# Patient Record
Sex: MALE | Race: WHITE | NOT HISPANIC OR LATINO | Employment: FULL TIME | URBAN - METROPOLITAN AREA
[De-identification: names, ages, dates, MRNs, and addresses within clinical notes are randomized per-mention and may not be internally consistent; named-entity substitution may affect disease eponyms.]

---

## 2017-05-03 ENCOUNTER — ALLSCRIPTS OFFICE VISIT (OUTPATIENT)
Dept: OTHER | Facility: OTHER | Age: 37
End: 2017-05-03

## 2017-05-06 ENCOUNTER — HOSPITAL ENCOUNTER (EMERGENCY)
Facility: HOSPITAL | Age: 37
Discharge: HOME | End: 2017-05-06
Attending: EMERGENCY MEDICINE
Payer: COMMERCIAL

## 2017-05-06 VITALS
HEART RATE: 83 BPM | SYSTOLIC BLOOD PRESSURE: 118 MMHG | BODY MASS INDEX: 41.75 KG/M2 | WEIGHT: 315 LBS | OXYGEN SATURATION: 98 % | DIASTOLIC BLOOD PRESSURE: 56 MMHG | RESPIRATION RATE: 18 BRPM | HEIGHT: 73 IN

## 2017-05-06 DIAGNOSIS — T15.00XA CORNEAL FOREIGN BODY: Primary | ICD-10-CM

## 2017-05-06 PROCEDURE — 99283 EMERGENCY DEPT VISIT LOW MDM: CPT

## 2017-05-06 PROCEDURE — 90715 TDAP VACCINE 7 YRS/> IM: CPT | Performed by: EMERGENCY MEDICINE

## 2017-05-06 PROCEDURE — 90471 IMMUNIZATION ADMIN: CPT

## 2017-05-06 RX ORDER — PROPARACAINE HYDROCHLORIDE 5 MG/ML
2 SOLUTION/ DROPS OPHTHALMIC ONCE
Status: COMPLETED | OUTPATIENT
Start: 2017-05-06 | End: 2017-05-06

## 2017-05-06 RX ORDER — TOBRAMYCIN 3 MG/ML
1 SOLUTION/ DROPS OPHTHALMIC ONCE
Status: COMPLETED | OUTPATIENT
Start: 2017-05-06 | End: 2017-05-06

## 2017-05-06 RX ADMIN — FLUORESCEIN SODIUM 1 STRIP: 1 STRIP OPHTHALMIC at 03:07

## 2017-05-06 RX ADMIN — TETANUS TOXOID, REDUCED DIPHTHERIA TOXOID AND ACELLULAR PERTUSSIS VACCINE, ADSORBED 0.5 ML: 5; 2.5; 8; 8; 2.5 SUSPENSION INTRAMUSCULAR at 03:13

## 2017-05-06 RX ADMIN — TOBRAMYCIN 1 DROP: 3 SOLUTION OPHTHALMIC at 03:14

## 2017-05-06 RX ADMIN — PROPARACAINE HYDROCHLORIDE 2 DROP: 5 SOLUTION/ DROPS OPHTHALMIC at 03:08

## 2017-11-20 ENCOUNTER — TRANSCRIBE ORDERS (OUTPATIENT)
Dept: SLEEP CENTER | Facility: CLINIC | Age: 37
End: 2017-11-20

## 2017-11-20 ENCOUNTER — GENERIC CONVERSION - ENCOUNTER (OUTPATIENT)
Dept: OTHER | Facility: OTHER | Age: 37
End: 2017-11-20

## 2017-11-20 DIAGNOSIS — G47.33 OBSTRUCTIVE SLEEP APNEA: ICD-10-CM

## 2017-11-20 DIAGNOSIS — M25.561 PAIN IN RIGHT KNEE: ICD-10-CM

## 2017-11-21 ENCOUNTER — TRANSCRIBE ORDERS (OUTPATIENT)
Dept: SLEEP CENTER | Facility: CLINIC | Age: 37
End: 2017-11-21

## 2017-11-21 DIAGNOSIS — G47.33 OSA (OBSTRUCTIVE SLEEP APNEA): Primary | ICD-10-CM

## 2017-11-27 ENCOUNTER — HOSPITAL ENCOUNTER (OUTPATIENT)
Dept: PULMONOLOGY | Facility: HOSPITAL | Age: 37
Discharge: HOME | End: 2017-11-27
Payer: COMMERCIAL

## 2017-11-27 ENCOUNTER — DOCUMENTATION (OUTPATIENT)
Dept: RESPIRATORY THERAPY | Facility: HOSPITAL | Age: 37
End: 2017-11-27

## 2017-11-27 DIAGNOSIS — G47.33 OBSTRUCTIVE SLEEP APNEA: ICD-10-CM

## 2017-11-27 LAB
BASE EXCESS BLDA CALC-SCNC: -2.1 MMOL/L
BODY TEMPERATURE: 98.6 DEGREES FEHRENHEIT
HCO3 BLDA-SCNC: 22.7 MMOL/L (ref 22–28)
O2 CT BLDA-SCNC: 20.4 ML/DL (ref 16–23)
OXYHGB MFR BLDA: 95.1 % (ref 94–97)
PCO2 BLDA: 39.5 MM HG (ref 36–44)
PCO2 TEMP ADJ BLDA: 39.5 MM HG (ref 36–44)
PH BLD: 7.38 [PH] (ref 7.35–7.45)
PH BLDA: 7.38 [PH] (ref 7.35–7.45)
PO2 BLD: 85.9 MM HG (ref 75–129)
PO2 BLDA: 85.9 MM HG (ref 75–129)

## 2017-11-27 PROCEDURE — 82805 BLOOD GASES W/O2 SATURATION: CPT

## 2017-11-27 PROCEDURE — 36600 WITHDRAWAL OF ARTERIAL BLOOD: CPT

## 2017-12-12 ENCOUNTER — ALLSCRIPTS OFFICE VISIT (OUTPATIENT)
Dept: OTHER | Facility: OTHER | Age: 37
End: 2017-12-12

## 2017-12-12 ENCOUNTER — APPOINTMENT (OUTPATIENT)
Dept: RADIOLOGY | Facility: OTHER | Age: 37
End: 2017-12-12
Payer: COMMERCIAL

## 2017-12-12 DIAGNOSIS — M25.561 PAIN IN RIGHT KNEE: ICD-10-CM

## 2017-12-12 PROCEDURE — 73564 X-RAY EXAM KNEE 4 OR MORE: CPT

## 2017-12-12 PROCEDURE — 73562 X-RAY EXAM OF KNEE 3: CPT

## 2017-12-13 NOTE — PROGRESS NOTES
Assessment    1  History of Neuroplasty Decompression Median Nerve At Carpal Tunnel   2  Family history of malignant neoplasm (V16 9) (Z80 9) : Family History   3  Family history of arthritis (V17 7) (Z82 61) : Family History   4  Right knee pain (719 46) (M25 561)   5  Infrapatellar bursitis of right knee (726 69) (M70 51)    Plan  Infrapatellar bursitis of right knee    · Follow-up PRN Evaluation and Treatment  Follow-up  Status: Complete  Done:[id number]  Right knee pain    · * XR KNEE 3 VW LEFT NON INJURY; Status:Active - Retrospective By ProtocolAuthorization; Requested for:[id number];    · * XR KNEE 4+ VW RIGHT INJURY; Status:Active - Retrospective By ProtocolAuthorization; Requested for:[id number];     Discussion/Summary    The patient has infrapatellar bursitis which is symptomatic when he kneels on it, it is not significant enough that aspiration would be helpful and it is not indicated for surgical excision as he would have pain over the scar with kneeling, instead we talked about knee pads and knee pad modifications to help avoid direct pressure to this area, hopefully this will improve his symptoms  History of Present Illness  HPI: Racheal Cohen is a 29-year-old male presents to the office with a painful bump on his right knee  He denies any injury or trauma  HE works as a  for [employer] so he is constantly kneeling  He denies redness or drainage  He has tried using knee pads without any significant improvement  He denies pain with walking or ascending or descending stairs  Review of Systems   Constitutional: No fever or chills, feels well, no tiredness, no recent weight loss or weight gain  Cardiovascular: No complaints of chest pain, no palpitations, no leg claudication or lower extremity edema  Respiratory: No complaints of shortness of breath, no wheezing, no cough  Gastrointestinal: No complaints of abdominal pain, no constipation, no nausea or vomiting, no diarrhea or bloody stools  Musculoskeletal: as noted in HPI  Integumentary: No complaints of skin rash or lesion, no itching or dry skin, no skin wounds  ROS reviewed  Active Problems  1  ADHD (attention deficit hyperactivity disorder) (314 01) (F90 9)   2  Hypertension (401 9) (I10)   3  JAMIE (obstructive sleep apnea) (327 23) (G47 33)   4  Right knee pain (719 46) (M25 561)    Past Medical History   · History of Community acquired pneumonia (5) (J18 9)   · History of mycobacterial infection (V12 09) (Z86 19)   · History of Hypoxia (799 02) (R09 02)    The active problems and past medical history were reviewed and updated today  Surgical History   · History of Neuroplasty Decompression Median Nerve At Carpal Tunnel   · History of Tonsillectomy    The surgical history was reviewed and updated today  Family History  Mother    · Family history of diabetes mellitus (V18 0) (Z83 3)  Family History    · Family history of arthritis (V17 7) (Z82 61)   · Family history of malignant neoplasm (V16 9) (Z80 9)    The family history was reviewed and updated today  Social History     · Daily caffeine consumption, 2-3 servings a day   · Never a smoker   · No alcohol use  The social history was reviewed and updated today  Current Meds   1  Cozaar 100 MG Oral Tablet; TAKE 1 TABLET DAILY; Therapy: (Recorded:[id number]) to Recorded   2  HydroCHLOROthiazide 25 MG Oral Tablet; TAKE 1 TABLET DAILY AS DIRECTED; Therapy: (Recorded:[id number]) to Recorded   3  Lexapro 10 MG Oral Tablet; TAKE 1 TABLET DAILY; Therapy: (Recorded:[id number]) to Recorded   4  Toprol  MG Oral Tablet Extended Release 24 Hour; TAKE 1 TABLET DAILY; Therapy: (Recorded:[id number]) to Recorded   5  Vyvanse 60 MG Oral Capsule; TAKE CAPSULE; Therapy: (Recorded:[id number]) to Recorded    The medication list was reviewed and updated today  Allergies  1   No Known Drug Allergies    Vitals  Signs     Heart Rate: 85  Systolic: 746, Sitting  Diastolic: 70, Sitting  Weight: 324 lb 6 oz  BMI Calculated: 42 8  BSA Calculated: 2 64    Physical Exam    Right Knee: Appearance: swelling (infrapatellar region )  Tenderness: not over the lateral joint line,-- not over the medial joint line-- and-- no popliteal mass  Palpatory findings include no crepitus  ROM: Full  Motor: Normal  Special Test: negative patellar grind,-- negative patellofemoral apprehension test,-- negative Bounce Home test,-- negative medial Alexa test,-- negative lateral Alexa test-- and-- negative Lachman's test   Constitutional - General appearance: Normal   Musculoskeletal - Gait and station: Normal -- Muscle strength/tone: Normal -- Lower extremity compartments: Normal   Neurologic - Sensation: Normal -- Lower extremity peripheral neuro exam: Normal   Psychiatric - Orientation to person, place, and time: Normal -- Mood and affect: Normal       Results/Data  I personally reviewed the films/images/results in the office today  My interpretation follows  X-ray Review 4 views right knee: no fx or dislocation; no OAviews left knee: no fx or dislocation; no OA        Signatures   Electronically signed by : KELLEY Bingham ; Dec 12 2017  4:22PM EST                       (Author)

## 2017-12-20 ENCOUNTER — HOSPITAL ENCOUNTER (OUTPATIENT)
Dept: SLEEP CENTER | Facility: CLINIC | Age: 37
Discharge: HOME | End: 2017-12-21
Payer: COMMERCIAL

## 2017-12-20 DIAGNOSIS — G47.33 OSA (OBSTRUCTIVE SLEEP APNEA): ICD-10-CM

## 2017-12-20 PROCEDURE — 95811 POLYSOM 6/>YRS CPAP 4/> PARM: CPT

## 2017-12-21 ENCOUNTER — LAB REQUISITION (OUTPATIENT)
Dept: LAB | Facility: HOSPITAL | Age: 37
End: 2017-12-21
Payer: COMMERCIAL

## 2017-12-21 ENCOUNTER — HOSPITAL ENCOUNTER (OUTPATIENT)
Dept: SLEEP CENTER | Facility: CLINIC | Age: 37
Discharge: HOME | End: 2017-12-22
Payer: COMMERCIAL

## 2017-12-21 DIAGNOSIS — G47.419 NARCOLEPSY WITHOUT CATAPLEXY: ICD-10-CM

## 2017-12-21 DIAGNOSIS — G47.419 PRIMARY NARCOLEPSY WITHOUT CATAPLEXY: ICD-10-CM

## 2017-12-21 PROCEDURE — 80307 DRUG TEST PRSMV CHEM ANLYZR: CPT | Performed by: INTERNAL MEDICINE

## 2017-12-21 PROCEDURE — 95805 MULTIPLE SLEEP LATENCY TEST: CPT

## 2018-01-08 LAB
AMPHETAMINES UR QL SCN: POSITIVE
BARBITURATES UR QL SCN: NEGATIVE NG/ML
BENZODIAZ UR QL SCN: NEGATIVE NG/ML
BZE UR QL SCN: NEGATIVE NG/ML
CANNABINOIDS UR QL SCN: NEGATIVE NG/ML
METHADONE UR QL SCN: NEGATIVE NG/ML
OPIATES UR QL: NEGATIVE
PCP UR QL: NEGATIVE NG/ML
PROPOXYPH UR QL: NEGATIVE NG/ML

## 2018-01-10 NOTE — MISCELLANEOUS
Message  I called the patient with PSG results - JAMIE mild with AHI 8 and lowest O2 sat 83%  Will order CPAP titration study as the patient is symptomatic with daytime somnolence  Plan  JAMIE (obstructive sleep apnea)    · *Polysomnography, Sleep Study, CPAP/BiPAP titration; Status:Need Information -  Financial Authorization; Requested for:[id number];   there any other medical conditions or medications that would explain these      symptoms? : No  is the sleep disturbance affecting the patient's ability to function? : fatigue, dyspnea  History/Symptoms: : daytime somnolence  Study Only or Consult : Sleep Study Only Follow up with Referring Physician    Signatures   Electronically signed by : Rickey Kanner, DO;  Apr 11 2016  9:57AM EST                       (Author)

## 2018-01-13 VITALS
OXYGEN SATURATION: 99 % | DIASTOLIC BLOOD PRESSURE: 74 MMHG | BODY MASS INDEX: 41.75 KG/M2 | TEMPERATURE: 97.7 F | RESPIRATION RATE: 14 BRPM | HEART RATE: 70 BPM | WEIGHT: 315 LBS | HEIGHT: 73 IN | SYSTOLIC BLOOD PRESSURE: 112 MMHG

## 2018-01-18 NOTE — MISCELLANEOUS
Message  The patient produced expectorated sputum in the hospital that today came back for acid fast bacilli in broth culture  I do not believe clinically that the patient has tuberculosis  I spoke with infectious disease physician who followed him in the hospital who agrees  At this time we will wait to see what is speciated out as the patient is completely asymptomatic at this time  Instead of doing a chest x-ray in 4 weeks we will repeat a CT scan  If there is complete resolution of infiltrate we will presume that this was a contaminant as it was not a bronchoscopy specimen  If there is an infiltrate we will consider bronchoscopy at that time  Plan  Acid fast bacillus, Community acquired pneumonia    · * CT CHEST WO CONTRAST; Status:Need Information - Financial Authorization;   Requested for:01Apr2016;     Signatures   Electronically signed by : Trisha Cloud DO; Feb 25 2016  3:07PM EST                       (Author)

## 2018-01-22 VITALS
HEIGHT: 73 IN | RESPIRATION RATE: 18 BRPM | WEIGHT: 315 LBS | TEMPERATURE: 97.2 F | SYSTOLIC BLOOD PRESSURE: 138 MMHG | DIASTOLIC BLOOD PRESSURE: 60 MMHG | HEART RATE: 86 BPM | BODY MASS INDEX: 41.75 KG/M2 | OXYGEN SATURATION: 96 %

## 2018-01-22 VITALS
WEIGHT: 315 LBS | BODY MASS INDEX: 42.8 KG/M2 | DIASTOLIC BLOOD PRESSURE: 70 MMHG | SYSTOLIC BLOOD PRESSURE: 131 MMHG | HEART RATE: 85 BPM

## 2018-02-21 DIAGNOSIS — G47.10 HYPERSOMNOLENCE: Primary | ICD-10-CM

## 2018-02-21 RX ORDER — MODAFINIL 200 MG/1
TABLET ORAL
Qty: 90 TABLET | Refills: 1 | Status: SHIPPED | OUTPATIENT
Start: 2018-02-21 | End: 2018-06-11 | Stop reason: SDUPTHER

## 2018-02-21 NOTE — TELEPHONE ENCOUNTER
Can you please make sure this is right  I only put one refill cause I did not know if you wanted more then that      Thank You

## 2018-04-13 DIAGNOSIS — G47.33 OSA (OBSTRUCTIVE SLEEP APNEA): Primary | ICD-10-CM

## 2018-04-13 RX ORDER — MODAFINIL 200 MG/1
200 TABLET ORAL
Qty: 60 TABLET | Refills: 0 | Status: SHIPPED | OUTPATIENT
Start: 2018-04-13 | End: 2018-06-11 | Stop reason: SDUPTHER

## 2018-04-13 RX ORDER — MODAFINIL 200 MG/1
200 TABLET ORAL
Qty: 60 TABLET | Refills: 0 | Status: SHIPPED | OUTPATIENT
Start: 2018-04-13 | End: 2018-04-13 | Stop reason: SDUPTHER

## 2018-06-11 DIAGNOSIS — G47.10 HYPERSOMNIA: Primary | ICD-10-CM

## 2018-06-11 RX ORDER — MODAFINIL 100 MG/1
100 TABLET ORAL DAILY
Qty: 30 TABLET | Refills: 0 | Status: SHIPPED | OUTPATIENT
Start: 2018-06-11 | End: 2018-06-12

## 2018-06-11 RX ORDER — MODAFINIL 100 MG/1
100 TABLET ORAL DAILY
Qty: 30 TABLET | Refills: 0 | Status: SHIPPED | OUTPATIENT
Start: 2018-06-11 | End: 2018-06-11 | Stop reason: CLARIF

## 2018-06-12 DIAGNOSIS — G47.10 HYPERSOMNIA: Primary | ICD-10-CM

## 2018-06-12 RX ORDER — MODAFINIL 100 MG/1
100 TABLET ORAL DAILY
Qty: 30 TABLET | Refills: 0 | Status: SHIPPED | OUTPATIENT
Start: 2018-06-12 | End: 2018-07-12 | Stop reason: SDUPTHER

## 2018-07-12 DIAGNOSIS — G47.10 HYPERSOMNIA: ICD-10-CM

## 2018-07-12 RX ORDER — MODAFINIL 100 MG/1
100 TABLET ORAL DAILY
Qty: 30 TABLET | Refills: 0 | Status: SHIPPED | OUTPATIENT
Start: 2018-07-12 | End: 2018-07-25 | Stop reason: SDUPTHER

## 2018-07-18 ENCOUNTER — DOCUMENTATION (OUTPATIENT)
Dept: PULMONOLOGY | Facility: HOSPITAL | Age: 38
End: 2018-07-18

## 2018-07-25 ENCOUNTER — OFFICE VISIT (OUTPATIENT)
Dept: PULMONOLOGY | Facility: CLINIC | Age: 38
End: 2018-07-25
Payer: COMMERCIAL

## 2018-07-25 VITALS
SYSTOLIC BLOOD PRESSURE: 138 MMHG | HEART RATE: 90 BPM | HEIGHT: 73 IN | BODY MASS INDEX: 41.75 KG/M2 | RESPIRATION RATE: 18 BRPM | TEMPERATURE: 96.8 F | DIASTOLIC BLOOD PRESSURE: 74 MMHG | OXYGEN SATURATION: 97 % | WEIGHT: 315 LBS

## 2018-07-25 DIAGNOSIS — G47.10 HYPERSOMNIA: ICD-10-CM

## 2018-07-25 DIAGNOSIS — G47.33 OSA (OBSTRUCTIVE SLEEP APNEA): Primary | ICD-10-CM

## 2018-07-25 DIAGNOSIS — F90.9 ATTENTION DEFICIT HYPERACTIVITY DISORDER (ADHD), UNSPECIFIED ADHD TYPE: ICD-10-CM

## 2018-07-25 PROCEDURE — 99214 OFFICE O/P EST MOD 30 MIN: CPT | Performed by: INTERNAL MEDICINE

## 2018-07-25 RX ORDER — LISDEXAMFETAMINE DIMESYLATE 30 MG/1
CAPSULE ORAL
Refills: 0 | COMMUNITY
Start: 2018-06-07 | End: 2018-07-25 | Stop reason: SDUPTHER

## 2018-07-25 RX ORDER — LISDEXAMFETAMINE DIMESYLATE 30 MG/1
30 CAPSULE ORAL EVERY MORNING
Qty: 90 CAPSULE | Refills: 0 | Status: SHIPPED | OUTPATIENT
Start: 2018-07-25 | End: 2018-08-23 | Stop reason: SDUPTHER

## 2018-07-25 RX ORDER — MODAFINIL 100 MG/1
100 TABLET ORAL DAILY
Qty: 90 TABLET | Refills: 3 | Status: SHIPPED | OUTPATIENT
Start: 2018-07-25 | End: 2018-09-28 | Stop reason: SDUPTHER

## 2018-07-25 RX ORDER — FEXOFENADINE HCL 180 MG/1
180 TABLET ORAL AS NEEDED
COMMUNITY

## 2018-07-25 NOTE — ASSESSMENT & PLAN NOTE
1  We will refill Provigil and Vyvanse   2  Patient will try new mask [facility] Mask   3   Continue CPAP for HS

## 2018-07-25 NOTE — PATIENT INSTRUCTIONS
JAMIE (obstructive sleep apnea)  1  We will refill Provigil and Vyvanse   2  Patient will try new mask [facility] Mask   3  Continue CPAP for HS     ADHD (attention deficit hyperactivity disorder)  4  Continue Provigil and Vyvanse   Refills Provided

## 2018-08-23 DIAGNOSIS — F90.9 ATTENTION DEFICIT HYPERACTIVITY DISORDER (ADHD), UNSPECIFIED ADHD TYPE: ICD-10-CM

## 2018-08-23 DIAGNOSIS — G47.10 HYPERSOMNIA: ICD-10-CM

## 2018-08-23 DIAGNOSIS — G47.33 OSA (OBSTRUCTIVE SLEEP APNEA): ICD-10-CM

## 2018-08-23 RX ORDER — LISDEXAMFETAMINE DIMESYLATE 30 MG/1
30 CAPSULE ORAL EVERY MORNING
Qty: 90 CAPSULE | Refills: 0 | Status: SHIPPED | OUTPATIENT
Start: 2018-08-23 | End: 2018-09-20 | Stop reason: SDUPTHER

## 2018-09-20 DIAGNOSIS — F90.9 ATTENTION DEFICIT HYPERACTIVITY DISORDER (ADHD), UNSPECIFIED ADHD TYPE: ICD-10-CM

## 2018-09-20 DIAGNOSIS — G47.10 HYPERSOMNIA: ICD-10-CM

## 2018-09-20 DIAGNOSIS — G47.33 OSA (OBSTRUCTIVE SLEEP APNEA): ICD-10-CM

## 2018-09-20 RX ORDER — LISDEXAMFETAMINE DIMESYLATE 30 MG/1
30 CAPSULE ORAL EVERY MORNING
Qty: 30 CAPSULE | Refills: 0 | Status: SHIPPED | OUTPATIENT
Start: 2018-09-20 | End: 2018-10-20

## 2018-09-28 DIAGNOSIS — G47.10 HYPERSOMNIA: ICD-10-CM

## 2018-09-28 RX ORDER — MODAFINIL 100 MG/1
100 TABLET ORAL DAILY
Qty: 90 TABLET | Refills: 0 | Status: SHIPPED | OUTPATIENT
Start: 2018-09-28 | End: 2019-10-10

## 2018-10-02 ENCOUNTER — OFFICE VISIT (OUTPATIENT)
Dept: BARIATRICS | Facility: CLINIC | Age: 38
End: 2018-10-02
Payer: COMMERCIAL

## 2018-10-02 VITALS
WEIGHT: 315 LBS | RESPIRATION RATE: 16 BRPM | HEIGHT: 73 IN | TEMPERATURE: 96.9 F | HEART RATE: 71 BPM | SYSTOLIC BLOOD PRESSURE: 120 MMHG | BODY MASS INDEX: 41.75 KG/M2 | DIASTOLIC BLOOD PRESSURE: 80 MMHG

## 2018-10-02 DIAGNOSIS — I10 ESSENTIAL HYPERTENSION: Primary | ICD-10-CM

## 2018-10-02 DIAGNOSIS — R73.01 IMPAIRED FASTING GLUCOSE: ICD-10-CM

## 2018-10-02 DIAGNOSIS — E66.01 MORBID OBESITY WITH BMI OF 40.0-44.9, ADULT (HCC): ICD-10-CM

## 2018-10-02 DIAGNOSIS — F90.9 ATTENTION DEFICIT HYPERACTIVITY DISORDER (ADHD), UNSPECIFIED ADHD TYPE: ICD-10-CM

## 2018-10-02 DIAGNOSIS — G47.33 OSA (OBSTRUCTIVE SLEEP APNEA): ICD-10-CM

## 2018-10-02 DIAGNOSIS — R63.5 ABNORMAL WEIGHT GAIN: ICD-10-CM

## 2018-10-02 PROCEDURE — 99204 OFFICE O/P NEW MOD 45 MIN: CPT | Performed by: PHYSICIAN ASSISTANT

## 2018-10-02 NOTE — ASSESSMENT & PLAN NOTE
-previously followed by psychiatrist  Reports now stable and following w/ PCP  -on Vyvanse and Lexapro

## 2018-10-02 NOTE — ASSESSMENT & PLAN NOTE
-likely to improve with weight loss  -well controlled on current regimen  -Patient to stop HCTZ when starting VLCD  Also instructed to monitor BP daily   To call office of BP less than 100/60 consistently or if feeling lightheaded or dizzy

## 2018-10-02 NOTE — PROGRESS NOTES
Assessment/Plan: Morbid obesity with BMI of 40 0-44 9, adult ([facility] [address] )  -Discussed options of HealthyCORE-Intensive Lifestyle Intervention Program, Very Low Calorie Diet-VLCD, Conservative Program, Jennifer-En-Y Gastric Bypass and Vertical Sleeve Gastrectomy and the role of weight loss medications   -Initial weight loss goal of 5-10% weight loss for improved health  -Screening labs: check labs  -Patient is interested in pursuing VLCD in about 3 weeks after wife has consultation  Would then like to pursue HealthyCORE  -Briefly discussed seeing behavior health specialist as patient reports he has an addiction to food  Reports consumes sweets and carbs due to enjoyment/taste and not for other reasons  Discussed mindfulness and trying to view food as fuel/nutrition  Impaired fasting glucose  Last HgbA1c 6 0  -recommend repeat at this time  -avoid refined carbohydrates    Hypertension  -likely to improve with weight loss  -well controlled on current regimen  -Patient to stop HCTZ when starting VLCD  Also instructed to monitor BP daily  To call office of BP less than 100/60 consistently or if feeling lightheaded or dizzy      JAMIE (obstructive sleep apnea)  -on AutoPap  -followed by pulmonology  -likely to improve with weight loss    ADHD (attention deficit hyperactivity disorder)  -previously followed by psychiatrist  Reports now stable and following w/ PCP  -on Vyvanse and Lexapro    Goals:    Food log (ie ) www myfitnesspal com,sparkpeople  com,loseit com,calorieking  com,etc    No sugary beverages  At least 80oz of water daily  Follow up in approximately 3 weeks with Non-Surgical Dietician  Diagnoses and all orders for this visit:    Essential hypertension  -     Comprehensive metabolic panel; Future  -     Lipid panel; Future  -     TSH, 3rd generation with Free T4 reflex; Future  -     HEMOGLOBIN A1C W/ EAG ESTIMATION;  Future    JAMIE (obstructive sleep apnea)    Impaired fasting glucose  -     HEMOGLOBIN A1C W/ EAG ESTIMATION; Future    Morbid obesity with BMI of 40 0-44 9, adult (HCC)  -     Comprehensive metabolic panel; Future  -     Lipid panel; Future  -     TSH, 3rd generation with Free T4 reflex; Future  -     HEMOGLOBIN A1C W/ EAG ESTIMATION; Future    Abnormal weight gain  -     Comprehensive metabolic panel; Future  -     Lipid panel; Future  -     TSH, 3rd generation with Free T4 reflex; Future  -     HEMOGLOBIN A1C W/ EAG ESTIMATION; Future    Attention deficit hyperactivity disorder (ADHD), unspecified ADHD type          Subjective:   Chief Complaint   Patient presents with    Consult     Patient is here for [facility] consult  Patient ID: Shona Darnell  is a 30 y o  male with excess weight/obesity here to pursue weight managment  Past Medical History:   Diagnosis Date    ADHD (attention deficit hyperactivity disorder)     Hypersomnolence     Hypertension     Hypoxia     Infrapatellar bursitis of right knee     Mycobacterial infectious disease     JMAIE (obstructive sleep apnea)      HPI:  Obesity/Excess Weight:  Severity: Severe  Onset:  Early 20's - Reports 40-50 lb weight gain in past 4 years    Modifiers: Previous diet (unsure of name with packaged meals), Exercise - Medifast  Contributing factors: reports he has an addiction to foods/carbs, lack of exercise  Associated symptoms: comorbid conditions    Goals: 250 lbs    B: skips or cereal  S: none  L: chick filet/fries/sweet tea or fast food - usually skips   S: none  D: [employer] meals however now eating take out - cheese steaks, chicken wings    Fluid: 140-200oz of water, Occasional coke when out to eat, brewed Ice-tea unsweetened  Exercise: none    The following portions of the patient's history were reviewed and updated as appropriate: allergies, current medications, past family history, past medical history, past social history, past surgical history and problem list     Review of Systems   Constitutional: Negative for chills and fever  HENT: Negative for sore throat  Respiratory: Negative for cough and shortness of breath  Cardiovascular: Negative for chest pain and palpitations  Gastrointestinal: Negative for abdominal pain, constipation, diarrhea, nausea and vomiting  Genitourinary: Negative for dysuria  Musculoskeletal: Negative for arthralgias  Skin: Negative for rash  Neurological: Positive for headaches (occasional)  Negative for dizziness  Psychiatric/Behavioral: The patient is not nervous/anxious  Denies depression       Objective:    /80 (BP Location: Left arm, Patient Position: Sitting, Cuff Size: Large)   Pulse 71   Temp (!) 96 9 °F (36 1 °C) (Tympanic)   Resp 16   Ht 6' 0 5" (1 842 m)   Wt (!) 151 kg (332 lb)   BMI 44 41 kg/m²     Physical Exam   Nursing note and vitals reviewed  Constitutional   General appearance: Abnormal   well developed and morbidly obese  Eyes No conjunctival pallor  Ears, Nose, Mouth, and Throat Oral mucosa moist    Pulmonary   Respiratory effort: No increased work of breathing or signs of respiratory distress  Auscultation of lungs: Clear to auscultation, equal breath sounds bilaterally, no wheezes, no rales, no rhonci  Cardiovascular   Auscultation of heart: Normal rate and rhythm, normal S1 and S2, without murmurs  Examination of extremities for edema and/or varicosities: Normal   no edema  Abdomen   Abdomen: Abnormal   The abdomen was obese  Bowel sounds were normal  The abdomen was soft and nontender     Musculoskeletal   Gait and station: Normal     Psychiatric   Orientation to person, place and time: Normal     Affect: appropriate

## 2018-10-02 NOTE — ASSESSMENT & PLAN NOTE
-Discussed options of HealthyCORE-Intensive Lifestyle Intervention Program, Very Low Calorie Diet-VLCD, Conservative Program, Jennifer-En-Y Gastric Bypass and Vertical Sleeve Gastrectomy and the role of weight loss medications   -Initial weight loss goal of 5-10% weight loss for improved health  -Screening labs: check labs  -Patient is interested in pursuing VLCD in about 3 weeks after wife has consultation  Would then like to pursue HealthyCORE  -Briefly discussed seeing behavior health specialist as patient reports he has an addiction to food  Reports consumes sweets and carbs due to enjoyment/taste and not for other reasons  Discussed mindfulness and trying to view food as fuel/nutrition

## 2018-10-10 LAB — HBA1C MFR BLD HPLC: 5.7 %

## 2018-10-19 ENCOUNTER — OFFICE VISIT (OUTPATIENT)
Dept: BARIATRICS | Facility: CLINIC | Age: 38
End: 2018-10-19

## 2018-10-19 VITALS — HEIGHT: 73 IN | BODY MASS INDEX: 41.75 KG/M2 | WEIGHT: 315 LBS

## 2018-10-19 DIAGNOSIS — R63.5 ABNORMAL WEIGHT GAIN: ICD-10-CM

## 2018-10-19 PROCEDURE — 99999 PR OFFICE/OUTPT VISIT,PROCEDURE ONLY: CPT

## 2018-10-19 PROCEDURE — VLCD

## 2018-10-19 NOTE — PROGRESS NOTES
Initial RD session for VLCD completed  Components of diet discussed including ketosis, hydration, possible side effects  2 weeks of product ordered and received  Will f/u via e-mail on 10/22

## 2018-10-22 ENCOUNTER — PATIENT OUTREACH (OUTPATIENT)
Dept: BARIATRICS | Facility: CLINIC | Age: 38
End: 2018-10-22

## 2018-11-01 ENCOUNTER — OFFICE VISIT (OUTPATIENT)
Dept: BARIATRICS | Facility: CLINIC | Age: 38
End: 2018-11-01

## 2018-11-01 VITALS
DIASTOLIC BLOOD PRESSURE: 70 MMHG | BODY MASS INDEX: 41.75 KG/M2 | HEIGHT: 73 IN | SYSTOLIC BLOOD PRESSURE: 124 MMHG | WEIGHT: 315 LBS

## 2018-11-01 DIAGNOSIS — R63.5 ABNORMAL WEIGHT GAIN: Primary | ICD-10-CM

## 2018-11-01 PROCEDURE — VLCD

## 2018-11-01 PROCEDURE — 99999 PR OFFICE/OUTPT VISIT,PROCEDURE ONLY: CPT

## 2018-11-01 NOTE — PROGRESS NOTES
Weight Management Medical Nutrition Assessment  Chani Thompson is here for his 2 week VLCD follow up  Current weight: 321 4 lbs  Loss of 14 2 lbs in 2 weeks  Pt reports that starting the VLCD was hard at first due to his physical job as a   Pt states that he has since adjusted, and makes sure to have his protein bar and/or 1-2 approved snacks on hand to combat fatigue  Recommended pt utilize the approved snacks on days that are more physical  Hydration adequate  No adverse side effects noted  Appropriate blood work was ordered and blood pressure was taken  Will continue VLCD at this time  Anthropometric Measurements  Start Weight (lbs): 335 6 lbs  Current Weight (lbs): 321 4 lbs  TBW % Change from start weight: 3%  Ideal Body Weight (lbs): 178 3 lbs    Weight Loss History  Previous weight loss attempts: Self Created Diets (Portion Control, Healthy Food Choices, etc )    Food and Nutrition Related History    Meal Plan  Breakfast: shake @ 7 am   Snack: protein bar @ 11 am  Lunch: shake @ 3 pm  Snack: none  Dinner: soup @ 7 pm  Snack: none     Beverages: 80 oz water    Food restrictions: <50 g CHO  Cooking: self   Food Shopping: self    Physical Activity Intake  Activity: physical job as a   Physical limitations/barriers to exercise: no intense exercise while on the VLCD    Estimated Needs  Energy  [employer] Energy Needs: BMR : 3656   1-2# loss sedentary: 5861-0923                 Protein:  g (1 2-1 5g/kg IBW)  Fluid: 94 oz (35mL/kg IBW)    Nutrition Diagnosis  Yes;     Overweight/obesity  related to Excess energy intake as evidenced by  BMI more than normative standard for age and sex (obesity-grade III 40+)       Nutrition Intervention  Meal Plan  3 meal replacements & 1 protein bar  1-2 approved snacks on physical days  Emergency meal if needed  80+ oz water    Nutrition Education:   Calorie controlled menu  Lean protein food choices  Healthy snack options  Food journaling tips      Nutrition Counseling:  Strategies: meal planning, portion sizes, healthy snack choices, hydration, fiber intake, protein intake, exercise, food journal      Monitoring and Evaluation:  Evaluation criteria:  Energy Intake: 800 kcal  Meet protein needs  Maintain adequate hydration  Monitor weekly weight  Meal planning/preparation  Food journal   Portions at mealtimes and snacks    Barriers to learning:none  Readiness to change: Action  Comprehension: very good  Expected Compliance: very good

## 2018-11-09 ENCOUNTER — TELEPHONE (OUTPATIENT)
Dept: BARIATRICS | Facility: CLINIC | Age: 38
End: 2018-11-09

## 2018-11-09 NOTE — TELEPHONE ENCOUNTER
Called the lab; spoke to [facility]  Results from 10/10/18 are being faxed  I called the pt; ashley to return call regarding his billing question

## 2018-11-09 NOTE — TELEPHONE ENCOUNTER
I never received labs for this patient from [address]  Can you please call [facility] and tell them to fax us the labs ASAP  I'm not sure what you mean about the billing? If the patient has a billing question, please direct them  to the billing office and give them that number

## 2018-11-12 ENCOUNTER — TELEPHONE (OUTPATIENT)
Dept: BARIATRICS | Facility: CLINIC | Age: 38
End: 2018-11-12

## 2018-11-12 NOTE — TELEPHONE ENCOUNTER
----- Message from Jem Gallardo PA-C sent at 11/12/2018  7:45 AM EST -----  Staff Message: Please call patient and let him know HgbA1c, glucose elevated consistent with prediabetes  Avoiding refined carbohydrates such as white bread, pasta, rice, pretzels, sweets, sugary beverages and weight loss can help improve/resolve this  Should incorporate cardiovascular exercise once he is off of VLCD  Triglyercides also mildly elevated - same recommendations as above  HDL or good cholesterol is low - weight loss and increasing cardiovascular exercise can help

## 2018-11-14 ENCOUNTER — OFFICE VISIT (OUTPATIENT)
Dept: BARIATRICS | Facility: CLINIC | Age: 38
End: 2018-11-14

## 2018-11-14 VITALS
HEIGHT: 73 IN | BODY MASS INDEX: 41.75 KG/M2 | SYSTOLIC BLOOD PRESSURE: 126 MMHG | DIASTOLIC BLOOD PRESSURE: 66 MMHG | WEIGHT: 315 LBS

## 2018-11-14 DIAGNOSIS — R63.5 ABNORMAL WEIGHT GAIN: ICD-10-CM

## 2018-11-14 PROCEDURE — VLCD

## 2018-11-14 PROCEDURE — 99999 PR OFFICE/OUTPT VISIT,PROCEDURE ONLY: CPT

## 2018-11-14 NOTE — PROGRESS NOTES
Weight Management Medical Nutrition Assessment  Rika Johnson is here for his 4 week VLCD follow up  Current weight: 315 6 lbs  Loss of 5 8 lbs in 2 weeks, and 20 lbs overall (5% TBW)  Over the last 2 weeks, pt utilized the emergency meal 3x for birthday parties (steak & broccoli)  Hydration adequate  Pt reports some fatigue and constipation  Pt is interested in transitioning off of the VLCD due to the upcoming Thanksgiving holiday  Transition diet was reviewed in detail, and pt plans on following for 1-2 weeks  Reviewed kcal needs for a 1-2 lbs weight loss, and recommended pt increase kcal level accordingly after following the transition diet  Recommended pt track intake using MyFitnessPal  Pt will follow up with the PA in 2 weeks, and then with the Dietitian for meal planning sessions as needed  Blood pressure was taken - Pt to follow up with PCP and PA regarding HTN medications  Anthropometric Measurements  Start Weight (lbs): 335 6 lbs  Current Weight (lbs): 315 6 lbs  TBW % Change from start weight: 5%  Ideal Body Weight (lbs): 178 3 lbs     Weight Loss History  Previous weight loss attempts: Self Created Diets (Portion Control, Healthy Food Choices, etc )    Food and Nutrition Related History    Meal Plan  3 meal replacements & 1 protein bar  Approved snacks on more physical days  Emergency meal 3x (steak & broccoli)    Beverages: 100-120 oz water on most days    Food restrictions: <50 g CHO  Cooking: self   Food Shopping: self    Physical Activity Intake  Activity: physical job  Physical limitations/barriers to exercise: no intense exercise while on VLCD    Estimated Needs  Energy  [employer] Energy Needs: BMR : 8685   1-2# loss sedentary: 0468-6686             Lightly active:   2323-7222   Protein:  g (1 2-1 5g/kg IBW)  Fluid: 94 oz (35mL/kg IBW)    Nutrition Diagnosis  Yes;     Overweight/obesity  related to Excess energy intake as evidenced by  BMI more than normative standard for age and sex (obesity-grade III 40+)       Nutrition Intervention  Meal Plan  Breakfast: meal replacement  Snack: protein bar  Lunch: meal replacement  Snack: low carb snack  Dinner: 5 oz lean protein & 1-1 5 cups of non-starchy vegetables  Snack: low carb snack    Nutrition Education:   Calorie controlled menu  Lean protein food choices  Healthy snack options  Food journaling tips      Nutrition Counseling:  Strategies: meal planning, portion sizes, healthy snack choices, hydration, fiber intake, protein intake, exercise, food journal      Monitoring and Evaluation:  Evaluation criteria:  Energy Intake: 5783-3790 kcal while on VLCD transition diet  Meet protein needs  Maintain adequate hydration  Monitor weekly weight  Meal planning/preparation  Food journal   Portions at mealtimes and snacks  Physical activity     Barriers to learning:none  Readiness to change: Action  Comprehension: very good  Expected Compliance: very good

## 2018-11-30 ENCOUNTER — OFFICE VISIT (OUTPATIENT)
Dept: BARIATRICS | Facility: CLINIC | Age: 38
End: 2018-11-30
Payer: COMMERCIAL

## 2018-11-30 VITALS
HEIGHT: 73 IN | BODY MASS INDEX: 41 KG/M2 | DIASTOLIC BLOOD PRESSURE: 60 MMHG | HEART RATE: 80 BPM | WEIGHT: 309.38 LBS | RESPIRATION RATE: 16 BRPM | SYSTOLIC BLOOD PRESSURE: 120 MMHG | TEMPERATURE: 97.1 F

## 2018-11-30 DIAGNOSIS — R73.01 IMPAIRED FASTING GLUCOSE: ICD-10-CM

## 2018-11-30 DIAGNOSIS — E66.01 MORBID OBESITY WITH BMI OF 40.0-44.9, ADULT (HCC): Primary | ICD-10-CM

## 2018-11-30 DIAGNOSIS — I10 ESSENTIAL HYPERTENSION: ICD-10-CM

## 2018-11-30 PROCEDURE — 99214 OFFICE O/P EST MOD 30 MIN: CPT | Performed by: PHYSICIAN ASSISTANT

## 2018-11-30 NOTE — ASSESSMENT & PLAN NOTE
-likely to improve with weight loss  -well controlled on current regimen  -BP well controlled off HCTZ

## 2018-11-30 NOTE — ASSESSMENT & PLAN NOTE
-Patient is pursuing conservative program after 4 weeks of VLCD  -Initial weight loss goal of 5-10% weight loss for improved health (met)    Initial: 332lbs  Current: 309  6   Change: -22 4 lbs ( 7% TBW)  Goal: 250 lbs

## 2018-11-30 NOTE — PATIENT INSTRUCTIONS
Goals: Food log (ie ) www myfitnesspal com,sparkpeople  com,loseit com,calorieking  com,etc    No sugary beverages  At least 64oz of water daily  Increase physical activity by 10 minutes daily  Gradually increase physical activity to a goal of 5 days per week for 30 minutes of MODERATE intensity PLUS 2 days per week of FULL BODY resistance training  1900 calories per day, increase 2100 if doing 30 min of formal exercise  5-10 servings of fruits and vegetables per day    Meal suggestion examples: Serving of oatmeal with breakfast shake   For Lunch Add 5oz greek yogurt + 1/2 cup berries w/ lunch with shake  Snacks: 2-3 per day (150-200 calories) Apple or banana + 1 TBSP PB, 1 cheese stick + orange or lovely, 1/4 cup nuts

## 2018-11-30 NOTE — PROGRESS NOTES
Assessment/Plan: Morbid obesity with BMI of 40 0-44 9, adult [facility])  -Patient is pursuing conservative program after 4 weeks of VLCD  -Initial weight loss goal of 5-10% weight loss for improved health (met)    Initial: 332lbs  Current: 309  6   Change: -22 4 lbs ( 7% TBW)  Goal: 250 lbs    Hypertension  -likely to improve with weight loss  -well controlled on current regimen  -BP well controlled off HCTZ      Impaired fasting glucose  -HgbA1c 5 7  -avoid refined carbohydrates  -likely to improve with weight loss    Goals:    Food log (ie ) www myfitnesspal com,sparkpeople  com,loseit com,calorieking  com,etc    No sugary beverages  At least 64oz of water daily  Increase physical activity by 10 minutes daily  Gradually increase physical activity to a goal of 5 days per week for 30 minutes of MODERATE intensity PLUS 2 days per week of FULL BODY resistance training  1900 calories per day, increase to 2100 if doing 30 min of formal exercise  5-10 servings of fruits and vegetables per day    Meal suggestion examples: Serving of oatmeal with breakfast  Lunch Add 5oz greek yogurt + 1/2 cup berries w/ shake  Snacks: 2-3 per day (150-200 calories) Apple or banana + 1 TBSP PB, 1 cheese stick + orange or lovely, 1/4 cup nuts    Follow up in approximately 6 weeks with Non-Surgical Physician/Advanced Practitioner  25 minute visit, >50% face-to-face time spent counseling patient on diet behavior and exercise modification for weight loss  Diagnoses and all orders for this visit:    Morbid obesity with BMI of 40 0-44 9, adult ([address] )    Essential hypertension    Impaired fasting glucose          Subjective:   Chief Complaint   Patient presents with    Follow-up     Pt is here for MWM follow up  Patient ID: Carlota Sheriff  is a 41 y o  male with excess weight/obesity here to pursue weight managment  Patient is pursuing Conservative Program after 4 weeks of VLCD    HPI Patient presents for MWM follow up   Doing well with hydration  oz of water per day  Logging inconsistently on DTTPal  Did not bring log with him today  Reports logging 3-4 days per week 900-1000 calories per day  Still using meal replacements - would like to continue using them at this time  He has interest in healthyCORE and will discuss with his wife  Discussed increasing calories to preserve lean muscle and have weight loss from fat mass  See goals  Food recall  B: shake 200  S: none  L: shake 200  S: snack bar 160  D: lean protein 8oz, veggies 2 cups, no starch   S: none    The following portions of the patient's history were reviewed and updated as appropriate: allergies, current medications, past family history, past medical history, past social history, past surgical history and problem list     Review of Systems   Respiratory: Negative for cough and shortness of breath  Cardiovascular: Negative for chest pain and palpitations  Gastrointestinal: Negative for abdominal pain, nausea and vomiting  Psychiatric/Behavioral:        Reports mood stable       Objective:    /60 (BP Location: Left arm, Patient Position: Sitting, Cuff Size: Large)   Pulse 80   Temp (!) 97 1 °F (36 2 °C) (Tympanic)   Resp 16   Ht 6' 0 5" (1 842 m)   Wt (!) 140 kg (309 lb 6 oz)   BMI 41 38 kg/m²      Physical Exam   Constitutional: He is oriented to person, place, and time  He appears well-developed  HENT:   Head: Normocephalic  Pulmonary/Chest: Effort normal    Neurological: He is alert and oriented to person, place, and time  Psychiatric: He has a normal mood and affect  His behavior is normal  Thought content normal    Nursing note and vitals reviewed

## 2018-12-10 DIAGNOSIS — G47.33 OSA (OBSTRUCTIVE SLEEP APNEA): Primary | ICD-10-CM

## 2018-12-10 NOTE — PROGRESS NOTES
I was called by Luis Davis who explained the machine is making a lot of noise  He discussed with christophe and its seems as if the machine is broken beyond repair  I have ordered a new machine with auto 5-10

## 2019-01-08 ENCOUNTER — DOCUMENTATION (OUTPATIENT)
Dept: PULMONOLOGY | Facility: CLINIC | Age: 39
End: 2019-01-08

## 2019-01-08 NOTE — PROGRESS NOTES
A script for a new CPAP machine has been faxed to [place] [facility] [place] along with patient's new insurance cards

## 2019-02-05 ENCOUNTER — OFFICE VISIT (OUTPATIENT)
Dept: BARIATRICS | Facility: CLINIC | Age: 39
End: 2019-02-05
Payer: COMMERCIAL

## 2019-02-05 VITALS
BODY MASS INDEX: 41.75 KG/M2 | TEMPERATURE: 96.7 F | RESPIRATION RATE: 16 BRPM | HEIGHT: 73 IN | SYSTOLIC BLOOD PRESSURE: 124 MMHG | DIASTOLIC BLOOD PRESSURE: 72 MMHG | WEIGHT: 315 LBS | HEART RATE: 71 BPM

## 2019-02-05 DIAGNOSIS — R73.01 IMPAIRED FASTING GLUCOSE: ICD-10-CM

## 2019-02-05 DIAGNOSIS — I10 ESSENTIAL HYPERTENSION: ICD-10-CM

## 2019-02-05 DIAGNOSIS — E66.01 MORBID OBESITY WITH BMI OF 40.0-44.9, ADULT (HCC): Primary | ICD-10-CM

## 2019-02-05 PROCEDURE — 99214 OFFICE O/P EST MOD 30 MIN: CPT | Performed by: PHYSICIAN ASSISTANT

## 2019-02-05 RX ORDER — METOPROLOL SUCCINATE 50 MG/1
50 TABLET, EXTENDED RELEASE ORAL DAILY
Refills: 1 | COMMUNITY
Start: 2019-01-12 | End: 2020-02-28 | Stop reason: SDUPTHER

## 2019-02-05 NOTE — PROGRESS NOTES
Assessment/Plan: Morbid obesity with BMI of 40 0-44 9, adult ([address] )  -Patient is pursuing conservative program after 4 weeks of VLCD  -Initial weight loss goal of 5-10% weight loss for improved health   -reports has not been consistently with dietary/lifestyle changes because he is lazy  -went on a 10 day cruise and reports gained 10 lbs   -initially had done very well and now finding it difficult to get back on track  Reports his wife is supportive of his weight loss efforts  -would like to continue with partial meal replacement  Set small goals for next visit    Initial: 332lbs  Current: 325 6 lbs (+16 lbs since 11/30)  Change: -6 4 lbs ( 2% TBW)  Goal: 250 lbs    Hypertension  -likely to improve with weight loss  -well controlled on current regimen  -BP well controlled off HCTZ      Impaired fasting glucose  -HgbA1c 5 7  -avoid refined carbohydrates  -likely to improve with weight loss  -consider repeat after HgbA1c after next follow up  Consider Metformin if it is still elevated  Goals:    Food log (ie ) www myfitnesspal com,sparkpeople  com,loseit com,calorieking  com,etc    No sugary beverages  At least 64oz of water daily  Increase physical activity by 10 minutes daily  Gradually increase physical activity to a goal of 5 days per week for 30 minutes of MODERATE intensity PLUS 2 days per week of FULL BODY resistance training  1900 calories per day, 2100 calories on exercise days  5-10 servings of fruits and vegetables per day  400 calorie breakfast  Add serving of fruit or vegetable to meal replacement at lunch  400-600 calorie dinner  Please don't skip breakfast    Follow up in approximately 1 month with Non-Surgical Physician/Advanced Practitioner  25 minute visit, >50% face-to-face time spent counseling patient on diet behavior and exercise modification for weight loss       Diagnoses and all orders for this visit:    Morbid obesity with BMI of 40 0-44 9, adult [facility])    Essential hypertension    Impaired fasting glucose    Other orders  -     metoprolol succinate (TOPROL-XL) 50 mg 24 hr tablet; Take 50 mg by mouth daily  -     lisdexamfetamine (VYVANSE) 30 MG capsule; Take 30 mg by mouth every morning          Subjective:   Chief Complaint   Patient presents with    Follow-up     pt is here for MWM follow up  Patient ID: Momo Perez  is a 41 y o  male with excess weight/obesity here to pursue weight managment  Patient is pursuing conservative program    HPI Patient presents for overdue  MWM follow up  Reports has been inconsistent with dietary/lifestyle changes  Went on a 10 day cruise in the beginning of January  Reports he started eating only one meal a day again  Has been skipping breakfast  Notes water intake is around 60-80 oz, denies intake of sugary, sweetened beverages  Has not been doing any formal exercise but reports he is in the process of setting up a home gym  Fruit/vegetable serving 1-3 per day  Has not been tracking calories and states he is not willing to do this  Has been out of meal replacements but would like start using a shake for lunch and a snack bar between lunch and dinner  The following portions of the patient's history were reviewed and updated as appropriate: allergies, current medications, past family history, past medical history, past social history, past surgical history and problem list     Review of Systems   Respiratory: Negative for cough and shortness of breath  Cardiovascular: Negative for chest pain and palpitations  Gastrointestinal: Negative for abdominal pain, nausea and vomiting  Psychiatric/Behavioral:        Reports mood stable       Objective:    /72 (BP Location: Left arm, Patient Position: Sitting, Cuff Size: Large)   Pulse 71   Temp (!) 96 7 °F (35 9 °C) (Tympanic)   Resp 16   Ht 6' 0 5" (1 842 m)   Wt (!) 148 kg (325 lb 9 6 oz)   BMI 43 55 kg/m²      Physical Exam   Constitutional: He is oriented to person, place, and time   He appears well-developed  HENT:   Head: Normocephalic  Pulmonary/Chest: Effort normal    Musculoskeletal: Normal range of motion  Neurological: He is alert and oriented to person, place, and time  Psychiatric: He has a normal mood and affect  His behavior is normal    Nursing note and vitals reviewed

## 2019-02-05 NOTE — ASSESSMENT & PLAN NOTE
-HgbA1c 5 7  -avoid refined carbohydrates  -likely to improve with weight loss  -consider repeat after HgbA1c after next follow up   Consider Metformin if it is still elevated

## 2019-02-05 NOTE — ASSESSMENT & PLAN NOTE
-Patient is pursuing conservative program after 4 weeks of VLCD  -Initial weight loss goal of 5-10% weight loss for improved health   -reports has not been consistently with dietary/lifestyle changes because he is lazy  -went on a 10 day cruise and reports gained 10 lbs   -initially had done very well and now finding it difficult to get back on track  Reports his wife is supportive of his weight loss efforts  -would like to continue with partial meal replacement   Set small goals for next visit    Initial: 332lbs  Current: 325 6 lbs (+16 lbs since 11/30)  Change: -6 4 lbs ( 2% TBW)  Goal: 250 lbs

## 2019-02-05 NOTE — PATIENT INSTRUCTIONS
Goals: Food log (ie ) www myfitnesspal com,sparkpeople  com,loseit com,calorieking  com,etc    No sugary beverages  At least 64oz of water daily  Increase physical activity by 10 minutes daily   Gradually increase physical activity to a goal of 5 days per week for 30 minutes of MODERATE intensity PLUS 2 days per week of FULL BODY resistance training  1900 calories per day, 2100 calories on exercise days  5-10 servings of fruits and vegetables per day - goal of 3-4 per day for next visit  400 calorie breakfast  Add serving of fruit or vegetable to meal replacement at lunch  400-600 calorie dinner  Please don't skip breakfast

## 2019-04-09 ENCOUNTER — OFFICE VISIT (OUTPATIENT)
Dept: BARIATRICS | Facility: CLINIC | Age: 39
End: 2019-04-09
Payer: COMMERCIAL

## 2019-04-09 VITALS
BODY MASS INDEX: 41.75 KG/M2 | HEIGHT: 73 IN | SYSTOLIC BLOOD PRESSURE: 120 MMHG | WEIGHT: 315 LBS | DIASTOLIC BLOOD PRESSURE: 78 MMHG | HEART RATE: 71 BPM | RESPIRATION RATE: 16 BRPM | TEMPERATURE: 97.5 F

## 2019-04-09 DIAGNOSIS — R73.01 IMPAIRED FASTING GLUCOSE: ICD-10-CM

## 2019-04-09 DIAGNOSIS — E66.01 MORBID OBESITY WITH BMI OF 40.0-44.9, ADULT (HCC): Primary | ICD-10-CM

## 2019-04-09 PROCEDURE — 99214 OFFICE O/P EST MOD 30 MIN: CPT | Performed by: PHYSICIAN ASSISTANT

## 2019-06-06 ENCOUNTER — OFFICE VISIT (OUTPATIENT)
Dept: BARIATRICS | Facility: CLINIC | Age: 39
End: 2019-06-06
Payer: COMMERCIAL

## 2019-06-06 VITALS
TEMPERATURE: 97.1 F | WEIGHT: 315 LBS | RESPIRATION RATE: 16 BRPM | HEART RATE: 72 BPM | BODY MASS INDEX: 41.75 KG/M2 | DIASTOLIC BLOOD PRESSURE: 74 MMHG | SYSTOLIC BLOOD PRESSURE: 118 MMHG | HEIGHT: 73 IN

## 2019-06-06 DIAGNOSIS — R73.01 IMPAIRED FASTING GLUCOSE: ICD-10-CM

## 2019-06-06 DIAGNOSIS — E66.01 MORBID OBESITY WITH BMI OF 40.0-44.9, ADULT (HCC): Primary | ICD-10-CM

## 2019-06-06 PROCEDURE — 99214 OFFICE O/P EST MOD 30 MIN: CPT | Performed by: PHYSICIAN ASSISTANT

## 2019-07-25 ENCOUNTER — TELEPHONE (OUTPATIENT)
Dept: BARIATRICS | Facility: CLINIC | Age: 39
End: 2019-07-25

## 2019-07-25 ENCOUNTER — OFFICE VISIT (OUTPATIENT)
Dept: BARIATRICS | Facility: CLINIC | Age: 39
End: 2019-07-25
Payer: COMMERCIAL

## 2019-07-25 VITALS
WEIGHT: 315 LBS | HEIGHT: 73 IN | HEART RATE: 76 BPM | SYSTOLIC BLOOD PRESSURE: 120 MMHG | BODY MASS INDEX: 41.75 KG/M2 | RESPIRATION RATE: 16 BRPM | DIASTOLIC BLOOD PRESSURE: 78 MMHG | TEMPERATURE: 97.3 F

## 2019-07-25 DIAGNOSIS — I10 ESSENTIAL HYPERTENSION: Primary | ICD-10-CM

## 2019-07-25 DIAGNOSIS — I10 ESSENTIAL HYPERTENSION: ICD-10-CM

## 2019-07-25 DIAGNOSIS — E66.01 MORBID OBESITY WITH BMI OF 40.0-44.9, ADULT (HCC): Primary | ICD-10-CM

## 2019-07-25 DIAGNOSIS — R73.01 IMPAIRED FASTING GLUCOSE: ICD-10-CM

## 2019-07-25 PROCEDURE — 3074F SYST BP LT 130 MM HG: CPT | Performed by: PHYSICIAN ASSISTANT

## 2019-07-25 PROCEDURE — 99214 OFFICE O/P EST MOD 30 MIN: CPT | Performed by: PHYSICIAN ASSISTANT

## 2019-07-25 NOTE — PROGRESS NOTES
Assessment/Plan: Morbid obesity with BMI of 40 0-44 9, adult [facility])  -Patient is pursuing conservative program after 4 weeks of VLCD  -Initial weight loss goal of 5-10% weight loss for improved health   -reports he was doing well with structure until wife went on a week vacation  Reports he was consuming lots of unhealthy foods and eating schedule was very sporadic  Skipping meals again  He is interested in seeing [address] now to help him work on his behavior and habits  - Patients wife is very supportive of his weight loss goals and has been helping out overall with meal prep and adding structure   -Discussed use of medications and also bariatric surgery  Patient is interested in continuing in conservative program  Not interested in medications  -See goals      Initial: 332lbs  Current: 326 8 lbs  Change: -5 2 lbs  Goal: 250 lbs    Hypertension  -likely to improve with weight loss  -well controlled on current regimen  -BP well controlled off HCTZ      Impaired fasting glucose  -HgbA1c 5 7  -avoid refined carbohydrates  -likely to improve with weight loss  -will repeat HgbA1c  Consider Metformin if it is still elevated    Goals:    Food log (ie ) www myfitnesspal com,sparkpeople  com,loseit com,calorieking  com,etc    No sugary beverages  At least 64oz of water daily  Increase physical activity by 10 minutes daily  Gradually increase physical activity to a goal of 5 days per week for 30 minutes of MODERATE intensity PLUS 2 days per week of FULL BODY resistance training  1900 calories per day  5-10 servings of fruits and vegetables per day  Trial making a schedule for the week - planning meals and snacks ahead of time  Keep healthy snacks in a designated area of the kitchen to avoid visual triggers  Check HgbA1c  Set a goal to not skip lunch, always bring a meal for lunch   Set a timer and try to not work through lunch period  [facility])    Follow up in approximately 2 months with Non-Surgical Physician/Advanced Practitioner  25 minute visit, >50% face-to-face time spent counseling patient on diet behavior and exercise modification for weight loss  Diagnoses and all orders for this visit:    Morbid obesity with BMI of 40 0-44 9, adult ([address] )  -     Hemoglobin A1C; Future    Essential hypertension    Impaired fasting glucose  -     Hemoglobin A1C; Future          Subjective:   Chief Complaint   Patient presents with    Follow-up     Pt is here for 6 wk follow up  Patient ID: Jose Morales  is a 41 y o  male with excess weight/obesity here to pursue weight managment  Patient is pursuing Conservative Program      HPI Patient presents for MWM follow up  Reports he derailed over past week when wife went on vacation  He struggles with structure and interval eating  Grabs convenient foods and often dines late in the evening  Tips given to help add more structure  He would like to pursue [facility]     The following portions of the patient's history were reviewed and updated as appropriate: allergies, current medications, past family history, past medical history, past social history, past surgical history and problem list     Review of Systems   Psychiatric/Behavioral:        Reports mood stable       Objective:    /78 (BP Location: Right arm, Patient Position: Sitting, Cuff Size: Large)   Pulse 76   Temp (!) 97 3 °F (36 3 °C) (Tympanic)   Resp 16   Ht 6' 0 5" (1 842 m)   Wt (!) 148 kg (326 lb 12 8 oz)   BMI 43 71 kg/m²      Physical Exam   Constitutional: He is oriented to person, place, and time  He appears well-developed  HENT:   Head: Normocephalic  Pulmonary/Chest: Effort normal    Neurological: He is alert and oriented to person, place, and time  Psychiatric: He has a normal mood and affect  His behavior is normal  Thought content normal    Nursing note and vitals reviewed

## 2019-07-25 NOTE — PATIENT INSTRUCTIONS
Goals: Food log (ie ) www myfitnesspal com,sparkpeople  com,loseit com,[employer]  com,etc    No sugary beverages  At least 64oz of water daily  Increase physical activity by 10 minutes daily  Gradually increase physical activity to a goal of 5 days per week for 30 minutes of MODERATE intensity PLUS 2 days per week of FULL BODY resistance training  1900 calories per day  5-10 servings of fruits and vegetables per day  Trial making a schedule for the week - planning meals and snacks ahead of time  Keep healthy snacks in a designated area of the kitchen to avoid visual triggers  Check HgbA1c  Set a goal to not skip lunch, always bring a meal for lunch   Set a timer and try to work through lunch period  Smart made meals [facility])

## 2019-07-25 NOTE — ASSESSMENT & PLAN NOTE
-Patient is pursuing conservative program after 4 weeks of VLCD  -Initial weight loss goal of 5-10% weight loss for improved health   -reports he was doing well with structure until wife went on a week vacation  Reports he was consuming lots of unhealthy foods and eating schedule was very sporadic  Skipping meals again  He is interested in seeing [facility] now to help him work on his behavior and habits  - Patients wife is very supportive of his weight loss goals and has been helping out overall with meal prep and adding structure   -Discussed use of medications and also bariatric surgery   Patient is interested in continuing in conservative program  Not interested in medications  -See goals      Initial: 332lbs  Current: 326 8 lbs  Change: -5 2 lbs  Goal: 250 lbs

## 2019-07-25 NOTE — ASSESSMENT & PLAN NOTE
-HgbA1c 5 7  -avoid refined carbohydrates  -likely to improve with weight loss  -will repeat HgbA1c   Consider Metformin if it is still elevated

## 2019-07-29 ENCOUNTER — PATIENT OUTREACH (OUTPATIENT)
Dept: BARIATRICS | Facility: CLINIC | Age: 39
End: 2019-07-29

## 2019-07-29 DIAGNOSIS — G47.33 OSA (OBSTRUCTIVE SLEEP APNEA): Primary | ICD-10-CM

## 2019-08-09 LAB
BUN SERPL-MCNC: 15 MG/DL (ref 6–20)
BUN/CREAT SERPL: 19 (ref 9–20)
CALCIUM SERPL-MCNC: 9.1 MG/DL (ref 8.7–10.2)
CHLORIDE SERPL-SCNC: 100 MMOL/L (ref 96–106)
CO2 SERPL-SCNC: 25 MMOL/L (ref 20–29)
CREAT SERPL-MCNC: 0.79 MG/DL (ref 0.76–1.27)
GLUCOSE SERPL-MCNC: 111 MG/DL (ref 65–99)
POTASSIUM SERPL-SCNC: 4.5 MMOL/L (ref 3.5–5.2)
SL AMB EGFR AFRICAN AMERICAN: 134 ML/MIN/1.73
SL AMB EGFR NON AFRICAN AMERICAN: 116 ML/MIN/1.73
SODIUM SERPL-SCNC: 139 MMOL/L (ref 134–144)

## 2019-08-12 ENCOUNTER — TELEPHONE (OUTPATIENT)
Dept: BARIATRICS | Facility: CLINIC | Age: 39
End: 2019-08-12

## 2019-08-12 NOTE — TELEPHONE ENCOUNTER
----- Message from Frederick Monterroso PA-C sent at 8/12/2019 10:15 AM EDT -----  Staff message: please call patient and let him know BMP is acceptable to start VLCD - will need an appt with Isaacjose Sutton  To start VLCD and have f/u every 2 weeks if he is interested in restarting this  I do not see that the HgbA1c was  Completed to check on his prediabetes? Did he have this done? His fasting blood sugar is still elevated on his BMP - would be good to have the HgbA1c if was not done   Thank you

## 2019-08-12 NOTE — TELEPHONE ENCOUNTER
I called and spoke to patient advised of labs  Pt stated he did not fast for the BMP he stated he was not aware of this  Pt requested script for HA1C to be faxed to [address] [place] [address] [facility]  Fax: [phone]  Lab printed and faxed, pt stated he will have test done on the day he comes to see [facility]

## 2019-08-15 ENCOUNTER — OFFICE VISIT (OUTPATIENT)
Dept: BARIATRICS | Facility: CLINIC | Age: 39
End: 2019-08-15

## 2019-08-15 VITALS — BODY MASS INDEX: 41.75 KG/M2 | HEIGHT: 73 IN | WEIGHT: 315 LBS

## 2019-08-15 DIAGNOSIS — R63.5 ABNORMAL WEIGHT GAIN: ICD-10-CM

## 2019-08-15 PROCEDURE — RECHECK

## 2019-08-15 PROCEDURE — VLCD

## 2019-08-15 NOTE — PROGRESS NOTES
Initial RD session for VLCD completed  Components of diet discussed including ketosis, hydration, possible side effects  2 weeks of product ordered and received  Will f/u via e-mail on 8/20

## 2019-08-16 LAB — HBA1C MFR BLD: 5.7 % (ref 4.8–5.6)

## 2019-08-20 ENCOUNTER — PATIENT OUTREACH (OUTPATIENT)
Dept: BARIATRICS | Facility: CLINIC | Age: 39
End: 2019-08-20

## 2019-08-28 NOTE — PROGRESS NOTES
Weight Management Medical Nutrition Assessment  Rebecca Mcclure is here for his 2 week VLCD follow up  Current weight: 320 1 lbs  Loss of 9 3 lbs since last visit  No adverse side effects noted and hydration is adequate  Pt has been utilizing 1-2 low-carb snacks to manage hunger and support physical job (works as a )  Pt states that he has been experiencing 1-2 episodes of diarrhea per day  Encouraged pt to continue with adequate hydration and to notify the office if his diarrhea worsens  Blood pressure was taken, appropriate labs were ordered, and body composition test was completed  Will continue with VLCD at this time  Anthropometric Measurements  Start Weight (lbs): 335 6 lbs at VLCD start in Fall 2018  Current Weight (lbs): 320 1 lbs  TBW % Change from start weight: 5%  Ideal Body Weight (lbs): 178 3 lbs     Weight Loss History  Previous weight loss attempts: Self Created Diets (Portion Control, Healthy Food Choices, etc ), [facility] in Fall 2018    Food and Nutrition Related History    Meal Plan  3 meal replacements & 1 protein bar   Cheese stick or hard boiled egg as a snack  1 emergency meal over the last 2 weeks  Beverages: 80+ oz water, diet coke some days    Food restrictions: <50 g CHO  Cooking: self   Food Shopping: self    Physical Activity Intake  Activity: works as a    Physical limitations/barriers to exercise: no intense exercise while on VLCD    Estimated Needs  Energy  [place]: BMR: 2561 X 1 3 -1000 = [address] Energy Needs: BMR : 1512   1-2# loss sedentary:  9352-7555         Protein:  g (1 2-1 5g/kg IBW)  Fluid: 94 oz (35mL/kg IBW)    Nutrition Diagnosis  Yes;     Overweight/obesity  related to Excess energy intake as evidenced by  BMI more than normative standard for age and sex (obesity-grade III 36+)       Nutrition Intervention  Meal Plan  3 meal replacements & 1 protein bar  Adequate hydration - 80+ oz water  1-2 approved snacks on light-moderate exercise days  Emergency meal when appropriate     Nutrition Education:   Calorie controlled menu  Lean protein food choices  Healthy snack options  Food journaling tips      Nutrition Counseling:  Strategies: meal planning, portion sizes, healthy snack choices, hydration, fiber intake, protein intake, exercise, food journal      Monitoring and Evaluation:  Evaluation criteria:  Energy Intake: ~800 kcal  Meet protein needs  Maintain adequate hydration  Monitor weekly weight  Meal planning/preparation  Food journal   Portions at mealtimes and snacks    Barriers to learning:none  Readiness to change: Action  Comprehension: excellent  Expected Compliance: excellent

## 2019-08-29 ENCOUNTER — OFFICE VISIT (OUTPATIENT)
Dept: BARIATRICS | Facility: CLINIC | Age: 39
End: 2019-08-29

## 2019-08-29 VITALS
SYSTOLIC BLOOD PRESSURE: 131 MMHG | DIASTOLIC BLOOD PRESSURE: 75 MMHG | HEIGHT: 73 IN | BODY MASS INDEX: 41.75 KG/M2 | WEIGHT: 315 LBS

## 2019-08-29 DIAGNOSIS — R63.5 ABNORMAL WEIGHT GAIN: Primary | ICD-10-CM

## 2019-08-29 PROCEDURE — RECHECK

## 2019-08-29 PROCEDURE — VLCD

## 2019-09-12 ENCOUNTER — OFFICE VISIT (OUTPATIENT)
Dept: BARIATRICS | Facility: CLINIC | Age: 39
End: 2019-09-12

## 2019-09-12 VITALS
WEIGHT: 311.2 LBS | BODY MASS INDEX: 41.24 KG/M2 | DIASTOLIC BLOOD PRESSURE: 67 MMHG | SYSTOLIC BLOOD PRESSURE: 118 MMHG | HEIGHT: 73 IN

## 2019-09-12 DIAGNOSIS — R63.5 ABNORMAL WEIGHT GAIN: ICD-10-CM

## 2019-09-12 PROCEDURE — VLCD

## 2019-09-12 PROCEDURE — RECHECK

## 2019-09-12 NOTE — PROGRESS NOTES
Weight Management Medical Nutrition Assessment  Tracie Sheth is here for his 4 week VLCD follow up  Current weight: 311 2 lbs  Loss of 8 9 lbs since last visit, and 18 2 lbs since starting VLCD 4 weeks ago (6% TBW)  No adverse side effects noted and hydration is adequate  Pt reports that his diarrhea has resolved since last visit  Pt utilized an emergency meal of steamed shrimp, crab, and brussel sprouts on one occasion  Pt completed blood work this morning - will review results as soon as available  Body composition test results reviewed and blood pressure was taken  Will continue VLCD at this time  Anthropometric Measurements  Start Weight (lbs): 329 4 lbs at VLCD restart, 335 6 lbs at VLCD start in Fall 2018  Current Weight (lbs): 311 2 lbs  TBW % Change from start weight: 6%  Ideal Body Weight (lbs): 178 3 lbs  Goal Weight (lbs): 230-250 lbs    Weight Loss History  Previous weight loss attempts: Self Created Diets (Portion Control, Healthy Food Choices, etc ), VLCD in Fall 2018    Food and Nutrition Related History    Meal Plan  3 meal replacements & 1 protein bar   Cheese stick or hard boiled egg as a snack on more physical days  1 emergency meal over the last 2 weeks  Beverages: 80+ oz water, 16 oz diet coke some days    Food restrictions: <50 g CHO  Cooking: self   Food Shopping: self    Physical Activity Intake  Activity: works as a    Physical limitations/barriers to exercise: no intense exercise while on VLCD    Estimated Needs  Energy  B[employer] [place] Energy Needs: BMR : 2388  1-2# loss sedentary:  0857-2246         Protein:  g (1 2-1 5g/kg IBW)  Fluid: 94 oz (35mL/kg IBW)    Nutrition Diagnosis  Yes;     Overweight/obesity  related to Excess energy intake as evidenced by  BMI more than normative standard for age and sex (obesity-grade III 36+)       Nutrition Intervention  Meal Plan  3 meal replacements & 1 protein bar  Adequate hydration - 80+ oz water  1-2 approved snacks on light-moderate exercise days  Emergency meal when appropriate     Nutrition Education:   Calorie controlled menu  Lean protein food choices  Healthy snack options  Food journaling tips      Nutrition Counseling:  Strategies: meal planning, portion sizes, healthy snack choices, hydration, fiber intake, protein intake, exercise, food journal      Monitoring and Evaluation:  Evaluation criteria:  Energy Intake: ~800 kcal  Meet protein needs  Maintain adequate hydration  Monitor weekly weight  Meal planning/preparation  Food journal   Portions at mealtimes and snacks    Barriers to learning:none  Readiness to change: Action  Comprehension: excellent  Expected Compliance: excellent

## 2019-09-13 LAB
BUN SERPL-MCNC: 17 MG/DL (ref 6–20)
BUN/CREAT SERPL: 21 (ref 9–20)
CALCIUM SERPL-MCNC: 9.4 MG/DL (ref 8.7–10.2)
CHLORIDE SERPL-SCNC: 101 MMOL/L (ref 96–106)
CO2 SERPL-SCNC: 24 MMOL/L (ref 20–29)
CREAT SERPL-MCNC: 0.82 MG/DL (ref 0.76–1.27)
GLUCOSE SERPL-MCNC: 88 MG/DL (ref 65–99)
MAGNESIUM SERPL-MCNC: 1.9 MG/DL (ref 1.6–2.3)
POTASSIUM SERPL-SCNC: 4.4 MMOL/L (ref 3.5–5.2)
SL AMB EGFR AFRICAN AMERICAN: 132 ML/MIN/1.73
SL AMB EGFR NON AFRICAN AMERICAN: 114 ML/MIN/1.73
SODIUM SERPL-SCNC: 140 MMOL/L (ref 134–144)

## 2019-09-17 ENCOUNTER — TELEPHONE (OUTPATIENT)
Dept: BARIATRICS | Facility: CLINIC | Age: 39
End: 2019-09-17

## 2019-09-17 NOTE — TELEPHONE ENCOUNTER
----- Message from Roger Abel PA-C sent at 9/17/2019  8:59 AM EDT -----  Please call patient and inform him that his bmp and magnesium are within acceptable range   Ok to continue vlcd

## 2019-09-22 NOTE — ASSESSMENT & PLAN NOTE
taking toprol-xl and cozaar  -should improve with weight loss, dietary, and lifestyle changes  -continue management with prescribing provider

## 2019-09-22 NOTE — ASSESSMENT & PLAN NOTE
-HgbA1c 5 7 in 8/2019  -avoid refined carbohydrates  -should improve with weight loss, dietary, and lifestyle changes  -can Consider Metformin

## 2019-09-22 NOTE — PROGRESS NOTES
Assessment/Plan: Morbid obesity with BMI of 40 0-44 9, adult ([facility] )  -Patient is pursuing Very Low Calorie Diet-VLCD  -Initial weight loss goal of 5-10% weight loss for improved health  -Screening labs    Initial:329 4 lbs   Current: 309 lbs   Change:-20 4 lbs       Hypertension  taking toprol-xl and cozaar  -should improve with weight loss, dietary, and lifestyle changes  -continue management with prescribing provider    Impaired fasting glucose  -HgbA1c 5 7 in 8/2019  -avoid refined carbohydrates  -should improve with weight loss, dietary, and lifestyle changes  -can Consider Metformin        Follow up in approximately 2 weeks with Non-Surgical Physician/Advanced Practitioner  Diagnoses and all orders for this visit:    Morbid obesity with BMI of 40 0-44 9, adult ([facility] )  -     Basic metabolic panel; Future  -     Magnesium; Future    Essential hypertension  -     Basic metabolic panel; Future  -     Magnesium; Future    Impaired fasting glucose  -     Basic metabolic panel; Future  -     Magnesium; Future    Other orders  -     VYVANSE 50 MG capsule; Take 50 mg by mouth daily          Subjective:   Chief Complaint   Patient presents with    Follow-up     pt is here for [facility] follow up  Patient ID: Darwin Salazar  is a 41 y o  male with excess weight/obesity here to pursue weight managment  Patient is pursuing Very Low Calorie Diet-VLCD  HPI    Patient notes he is trying to stay compliant but had a new baby less than two weeks ago  Did note he missed a few packets and was eating them at odd times and was down on his fluid intake  Denies having any other food besides the products denies issues with bowels  Back to drinking at least 80 oz of water  Still taking toprol-xl and cozaar and denies any symptoms of hypotension       Colonoscopy-Not applicable    The following portions of the patient's history were reviewed and updated as appropriate: allergies, current medications, past family history, past medical history, past social history, past surgical history and problem list     Review of Systems   HENT: Positive for sore throat  Respiratory: Negative for cough and shortness of breath  Cardiovascular: Negative for chest pain and palpitations  Gastrointestinal: Negative for abdominal pain, constipation, diarrhea, nausea and vomiting  Denies GERD   Skin: Negative for rash  Psychiatric/Behavioral: Negative for suicidal ideas  Denies depression and anxiety       Objective:    /78 (BP Location: Left arm, Patient Position: Sitting, Cuff Size: Large)   Pulse 69   Temp (!) 96 9 °F (36 1 °C) (Tympanic)   Resp 16   Ht 6' 0 5" (1 842 m)   Wt (!) 140 kg (309 lb)   BMI 41 33 kg/m²      Physical Exam   Nursing note and vitals reviewed  Constitutional   General appearance: Abnormal   well developed and morbidly obese  Eyes No conjunctival pallor  Ears, Nose, Mouth, and Throat Oral mucosa moist    Pulmonary   Respiratory effort: No increased work of breathing or signs of respiratory distress  Auscultation of lungs: Clear to auscultation, equal breath sounds bilaterally, no wheezes, no rales, no rhonci  Cardiovascular   Auscultation of heart: Normal rate and rhythm, normal S1 and S2, without murmurs  Examination of extremities for edema and/or varicosities: Normal   no edema  Abdomen   Abdomen: Abnormal   The abdomen was obese  Bowel sounds were normal  The abdomen was soft and nontender     Musculoskeletal   Gait and station: Normal     Psychiatric   Orientation to person, place and time: Normal     Affect: appropriate

## 2019-09-22 NOTE — ASSESSMENT & PLAN NOTE
-Patient is pursuing Very Low Calorie Diet-VLCD  -Initial weight loss goal of 5-10% weight loss for improved health  -Screening labs    Initial:329 4 lbs   Current: 309 lbs   Change:-20 4 lbs

## 2019-09-24 ENCOUNTER — OFFICE VISIT (OUTPATIENT)
Dept: BARIATRICS | Facility: CLINIC | Age: 39
End: 2019-09-24
Payer: COMMERCIAL

## 2019-09-24 VITALS
TEMPERATURE: 96.9 F | WEIGHT: 309 LBS | HEART RATE: 69 BPM | DIASTOLIC BLOOD PRESSURE: 78 MMHG | SYSTOLIC BLOOD PRESSURE: 118 MMHG | BODY MASS INDEX: 40.95 KG/M2 | HEIGHT: 73 IN | RESPIRATION RATE: 16 BRPM

## 2019-09-24 DIAGNOSIS — R73.01 IMPAIRED FASTING GLUCOSE: ICD-10-CM

## 2019-09-24 DIAGNOSIS — E66.01 MORBID OBESITY WITH BMI OF 40.0-44.9, ADULT (HCC): Primary | ICD-10-CM

## 2019-09-24 DIAGNOSIS — I10 ESSENTIAL HYPERTENSION: ICD-10-CM

## 2019-09-24 PROCEDURE — 3074F SYST BP LT 130 MM HG: CPT | Performed by: PHYSICIAN ASSISTANT

## 2019-09-24 PROCEDURE — 99213 OFFICE O/P EST LOW 20 MIN: CPT | Performed by: PHYSICIAN ASSISTANT

## 2019-09-24 PROCEDURE — 3078F DIAST BP <80 MM HG: CPT | Performed by: PHYSICIAN ASSISTANT

## 2019-09-24 RX ORDER — LISDEXAMFETAMINE DIMESYLATE 50 MG
50 CAPSULE ORAL DAILY
Refills: 0 | COMMUNITY
Start: 2019-08-30 | End: 2020-01-21 | Stop reason: SDUPTHER

## 2019-10-08 ENCOUNTER — OFFICE VISIT (OUTPATIENT)
Dept: PULMONOLOGY | Facility: HOSPITAL | Age: 39
End: 2019-10-08
Payer: COMMERCIAL

## 2019-10-08 ENCOUNTER — OFFICE VISIT (OUTPATIENT)
Dept: BARIATRICS | Facility: CLINIC | Age: 39
End: 2019-10-08

## 2019-10-08 VITALS
HEART RATE: 68 BPM | TEMPERATURE: 96.8 F | OXYGEN SATURATION: 96 % | SYSTOLIC BLOOD PRESSURE: 126 MMHG | HEIGHT: 73 IN | BODY MASS INDEX: 40.63 KG/M2 | DIASTOLIC BLOOD PRESSURE: 78 MMHG | WEIGHT: 306.6 LBS

## 2019-10-08 VITALS
DIASTOLIC BLOOD PRESSURE: 72 MMHG | WEIGHT: 305 LBS | SYSTOLIC BLOOD PRESSURE: 129 MMHG | BODY MASS INDEX: 40.42 KG/M2 | HEIGHT: 73 IN

## 2019-10-08 DIAGNOSIS — G47.10 HYPERSOMNIA: ICD-10-CM

## 2019-10-08 DIAGNOSIS — G47.33 OSA (OBSTRUCTIVE SLEEP APNEA): ICD-10-CM

## 2019-10-08 DIAGNOSIS — F90.9 ATTENTION DEFICIT HYPERACTIVITY DISORDER (ADHD), UNSPECIFIED ADHD TYPE: Primary | ICD-10-CM

## 2019-10-08 DIAGNOSIS — F51.12 INSUFFICIENT SLEEP SYNDROME: ICD-10-CM

## 2019-10-08 DIAGNOSIS — R63.5 ABNORMAL WEIGHT GAIN: ICD-10-CM

## 2019-10-08 PROCEDURE — 99213 OFFICE O/P EST LOW 20 MIN: CPT | Performed by: INTERNAL MEDICINE

## 2019-10-08 PROCEDURE — RECHECK

## 2019-10-08 PROCEDURE — VLCD

## 2019-10-08 NOTE — PROGRESS NOTES
Weight Management Medical Nutrition Assessment  Lucila Mortimer is here for his 8 week VLCD follow up  Current weight: 304 lbs  Loss of 4 lbs since last visit, and 24lbs since starting VLCD 8 weeks ago (8% TBW)  No adverse side effects noted and hydration is adequate  Pt utilized one emergency meal of grilled chicken on one occasion  Pt has not completed blood work entered on 9/24 due to insurance issues and receiving a bill each time  The issue is in the process of being resolved  Pt did reported eventually getting reimbursed for the first round to blood work, therefore encouraged pt get this round done  Provided with rx  All past labs were WNL  Blood pressure was taken at 129/72 which he reports is high for him, but still normal  Pt does have a 1week old baby at home and is not currently getting adequate sleep  Pt to continue VLCD at this time  Anthropometric Measurements  Start Weight (lbs): 329 4 lbs at VLCD restart, 335 6 lbs at VLCD start in Fall 2018  Current Weight (lbs): 304 lbs  TBW % Change from start weight: 8%  Ideal Body Weight (lbs): 178 3 lbs  Goal Weight (lbs): 230-250 lbs    Weight Loss History  Previous weight loss attempts: Self Created Diets (Portion Control, Healthy Food Choices, etc ), VLCD in Fall 2018    Food and Nutrition Related History    Meal Plan  3 meal replacements & 1 protein bar   No snacks at this time because not extra physical activity due to 1week old baby at home  1 emergency meal over the last 2 weeks  Beverages: 80+ oz water, 16 oz diet coke some days    Food restrictions: <50 g CHO  Cooking: self   Food Shopping: self    Physical Activity Intake  Activity: works as a  - going back to work next week after being off of work for the past month since having the baby  Physical limitations/barriers to exercise: no intense exercise while on VLCD    Estimated Needs  Energy  Bear Medardo Energy Needs:  BMR : 2388  1-2# loss sedentary:  0784-6160         Protein:  g (1 2-1 5g/kg IBW)  Fluid: 94 oz (35mL/kg IBW)    Nutrition Diagnosis  Yes;     Overweight/obesity  related to Excess energy intake as evidenced by  BMI more than normative standard for age and sex (obesity-grade III 36+)       Nutrition Intervention  Meal Plan  3 meal replacements & 1 protein bar (8am, 11am, 2pm, 5pm)  Adequate hydration - 80+ oz water  1-2 approved snacks on light-moderate exercise days  Emergency meal when appropriate     Nutrition Education:   Calorie controlled menu  Lean protein food choices  Healthy snack options  Food journaling tips      Nutrition Counseling:  Strategies: meal planning, portion sizes, healthy snack choices, hydration, fiber intake, protein intake, exercise, food journal      Monitoring and Evaluation:  Evaluation criteria:  Energy Intake: ~800 kcal  Meet protein needs  Maintain adequate hydration  Monitor weekly weight  Meal planning/preparation  Food journal   Portions at mealtimes and snacks    Barriers to learning:none  Readiness to change: Action  Comprehension: excellent  Expected Compliance: excellent

## 2019-10-08 NOTE — LETTER
October 10, 2019     [facility] [address] [address]    Patient: Hellen Carrel   YOB: 1980   Date of Visit: 10/8/2019       Dear Dr Tommy Frank: Thank you for referring Hellen Carrel to me for evaluation  Below are my notes for this consultation  If you have questions, please do not hesitate to call me  I look forward to following your patient along with you  Sincerely,        Donald Gallardo DO        CC: No Recipients  Donald Gallardo DO  10/10/2019 12:16 PM  Sign at close encounter  Progress Note - Sleep Medicine  Hellen Carrel 41 y o  male MRN: [id number]       Impression & Plan:   42 y/o M with PMHx of JAMIE on autoPAP, HTN and ADHD on Vyvanse who comes in for daytime sleepiness despite CPAP use  1  Excessive daytime sleepiness - this is highly likely to be reltaed to insufficient sleep and intermittent use of CPAP  -  I encouraged him to be more consistent with the use of his CPAP  -  Management of JAMIE as below      -  Continue Vyvanse 50mg PO Daily  2   JAMIE on autoPAP 5-10 - residual AHI 2 6 but snoring through the CPAP  Compliance has been less ideal with the new baby  -  He still has trouble with leak  We will give him a sample of simplus mask and I have asked him to see if an F30 may be of more benefit for him if that does not work       -  Average pressure 6 8 with max 9 6  Will attempt to adjust pressure to see if it is better about the snoring  Auto 7-15 cc of H2O pressure        -  Follow compliance in 2 months  3    Decreased sleep latency in MSLT  This  implies hypersomnolence but could be due to insufficient sleep and what was likely delayed sleep phase           -  He has changed his habits and had been doing well for a while prior to the new baby  He will work on going back to being more disciplined about sleep      4  ADHD -  Continue Vyvanse 50mg Daily  Refills provided    Follow with PCP, psychiatry in the future  ______________________________________________________________________    HPI:    Houston Wells presents today for follow-up for daytime sleepiness  His wife most recently had a new baby boy  He has been up frequently to help with the baby as he wakes up int he middle of the night  As a result he has not been sleeping well  He is only sleeping 4 hrs a day or less and has only been using his CPAP about half the time  He is very sleepy through the day even despite the recent increase of Vyvanse to 50mg for ADHD  In addition, his wife mentions that he also snores despite the use of the mask  Review of Systems:  Review of Systems   Constitutional: Positive for fatigue  Negative for appetite change  HENT: Negative  Respiratory: Negative  Cardiovascular: Negative  Gastrointestinal: Negative  Genitourinary: Negative  Musculoskeletal: Negative  Skin: Negative  Allergic/Immunologic: Negative  Neurological: Negative  Hematological: Negative  Psychiatric/Behavioral: Negative            Social history updates:  Social History     Tobacco Use   Smoking Status Never Smoker   Smokeless Tobacco Never Used     Social History     Socioeconomic History    Marital status: /Civil Union     Spouse name: Not on file    Number of children: Not on file    Years of education: Not on file    Highest education level: Not on file   Occupational History    Not on file   Social Needs    Financial resource strain: Not on file    Food insecurity:     Worry: Not on file     Inability: Not on file    Transportation needs:     Medical: Not on file     Non-medical: Not on file   Tobacco Use    Smoking status: Never Smoker    Smokeless tobacco: Never Used   Substance and Sexual Activity    Alcohol use: Yes     Comment: rare    Drug use: No    Sexual activity: Not on file   Lifestyle    Physical activity:     Days per week: Not on file     Minutes per session: Not on file  Stress: Not on file   Relationships    Social connections:     Talks on phone: Not on file     Gets together: Not on file     Attends Druze service: Not on file     Active member of club or organization: Not on file     Attends meetings of clubs or organizations: Not on file     Relationship status: Not on file    Intimate partner violence:     Fear of current or ex partner: Not on file     Emotionally abused: Not on file     Physically abused: Not on file     Forced sexual activity: Not on file   Other Topics Concern    Not on file   Social History Narrative    Not on file       PhysicalExamination:  Vitals:   /78 (BP Location: Left arm, Patient Position: Sitting, Cuff Size: Standard)   Pulse 68   Temp (!) 96 8 °F (36 °C) (Tympanic)   Ht 6' 1" (1 854 m)   Wt (!) 139 kg (306 lb 9 6 oz)   SpO2 96%   BMI 40 45 kg/m²    General: Pleasant, mild somnolence, Awake alert and oriented x 3, conversant without conversational dyspnea, NAD, normal affect  HEENT:  PERRL, Sclera noninjected, nonicteric OU, Nares patent,  no craniofacial abnormalities, Mucous membranes, moist, no oral lesions, normal dentition  NECK: Trachea midline, no accessory muscle use, no stridor, no cervical or supraclavicular adenopathy, JVP not elevated  CARDIAC: Reg, single s1/S2, no m/r/g  PULM: CTA bilaterally no wheezing, rhonchi or rales  ABD: Normoactive bowel sounds, soft nontender, nondistended, no rebound, no rigidity, no guarding  EXT: No cyanosis, no clubbing, no edema, normal capillary refill  NEURO: no focal neurologic deficits, AAOx3, moving all extremities appropriately      Diagnostic Data:  Labs: I personally reviewed the most recent laboratory data pertinent to today's visit  I have personally reviewed pertinent lab results    Lab Results   Component Value Date    WBC 10 10 02/13/2016    WBC 10 10 02/13/2016    HGB 12 4 02/13/2016    HGB 12 4 02/13/2016    HCT 37 6 02/13/2016    HCT 37 6 02/13/2016    MCV 86 02/13/2016    MCV 86 02/13/2016     02/13/2016     02/13/2016     Lab Results   Component Value Date    GLUCOSE 122 02/10/2016    CALCIUM 8 0 (L) 02/13/2016    K 4 4 09/12/2019    CO2 24 09/12/2019     09/12/2019    BUN 17 09/12/2019    CREATININE 0 82 09/12/2019     No results found for: IGE  Lab Results   Component Value Date    ALT 71 02/09/2016    AST 96 (H) 02/09/2016    ALKPHOS 60 02/09/2016     Sleep studies:  CPAP with MSLT on CPAP:  Mr Jule Nyhan underwent a CPAP titration and was found to have a more optimal pressure of 10  Therefore, I recommend increasing min autoPAP pressure to 10 with max pressure of 20 using the [employer] interface  Evaluate compliance data in 1-2 months  In addition, he had a decreased sleep latency of 1 minute which implies hypersomnia  MSLT with CPAP pending       Compliance Data:  Type of CPAP:  autoPAP 5-10                                   Percent usage: 50%                                   Average time used: 4 hrs 15 mins                                  8 5L leak                                   Residual AHI: 2 6                                       Malina Talley DO

## 2019-10-10 ENCOUNTER — DOCUMENTATION (OUTPATIENT)
Dept: PULMONOLOGY | Facility: CLINIC | Age: 39
End: 2019-10-10

## 2019-10-10 NOTE — PROGRESS NOTES
Progress Note - Sleep Medicine  Marietta Alvarez 41 y o  male MRN: [id number]       Impression & Plan:   40 y/o M with PMHx of JAMIE on autoPAP, HTN and ADHD on Vyvanse who comes in for daytime sleepiness despite CPAP use  1  Excessive daytime sleepiness - this is highly likely to be reltaed to insufficient sleep and intermittent use of CPAP  -  I encouraged him to be more consistent with the use of his CPAP  -  Management of JAMIE as below      -  Continue Vyvanse 50mg PO Daily  2   JAMIE on autoPAP 5-10 (DME - [employer]) - residual AHI 2 6 but snoring through the CPAP  Compliance has been less ideal with the new baby  -  He still has trouble with leak  We will give him a sample of simplus mask and I have asked him to see if an F30 may be of more benefit for him if that does not work       -  Average pressure 6 8 with max 9 6  Will attempt to adjust pressure to see if it is better about the snoring  Auto 7-15 cc of H2O pressure        -  Follow compliance in 2 months  3    Decreased sleep latency in MSLT  This implies hypersomnolence but could be due to insufficient sleep and what was likely delayed sleep phase           -  He has changed his habits and had been doing well for a while prior to the new baby  He will work on going back to being more disciplined about sleep      4  ADHD -  Continue Vyvanse 50mg Daily  Refills provided  Follow with PCP, psychiatry in the future  ______________________________________________________________________    HPI:    Marietta Alvarez presents today for follow-up for daytime sleepiness  His wife most recently had a new baby boy  He has been up frequently to help with the baby as he wakes up int he middle of the night  As a result he has not been sleeping well  He is only sleeping 4 hrs a day or less and has only been using his CPAP about half the time  He is very sleepy through the day even despite the recent increase of Vyvanse to 50mg for ADHD  In addition, his wife mentions that he also snores despite the use of the mask  Review of Systems:  Review of Systems   Constitutional: Positive for fatigue  Negative for appetite change  HENT: Negative  Respiratory: Negative  Cardiovascular: Negative  Gastrointestinal: Negative  Genitourinary: Negative  Musculoskeletal: Negative  Skin: Negative  Allergic/Immunologic: Negative  Neurological: Negative  Hematological: Negative  Psychiatric/Behavioral: Negative            Social history updates:  Social History     Tobacco Use   Smoking Status Never Smoker   Smokeless Tobacco Never Used     Social History     Socioeconomic History    Marital status: /Civil Union     Spouse name: Not on file    Number of children: Not on file    Years of education: Not on file    Highest education level: Not on file   Occupational History    Not on file   Social Needs    Financial resource strain: Not on file    Food insecurity:     Worry: Not on file     Inability: Not on file    Transportation needs:     Medical: Not on file     Non-medical: Not on file   Tobacco Use    Smoking status: Never Smoker    Smokeless tobacco: Never Used   Substance and Sexual Activity    Alcohol use: Yes     Comment: rare    Drug use: No    Sexual activity: Not on file   Lifestyle    Physical activity:     Days per week: Not on file     Minutes per session: Not on file    Stress: Not on file   Relationships    Social connections:     Talks on phone: Not on file     Gets together: Not on file     Attends Jainism service: Not on file     Active member of club or organization: Not on file     Attends meetings of clubs or organizations: Not on file     Relationship status: Not on file    Intimate partner violence:     Fear of current or ex partner: Not on file     Emotionally abused: Not on file     Physically abused: Not on file     Forced sexual activity: Not on file   Other Topics Concern    Not on file   Social History Narrative    Not on file       PhysicalExamination:  Vitals:   /78 (BP Location: Left arm, Patient Position: Sitting, Cuff Size: Standard)   Pulse 68   Temp (!) 96 8 °F (36 °C) (Tympanic)   Ht 6' 1" (1 854 m)   Wt (!) 139 kg (306 lb 9 6 oz)   SpO2 96%   BMI 40 45 kg/m²   General: Pleasant, mild somnolence, Awake alert and oriented x 3, conversant without conversational dyspnea, NAD, normal affect  HEENT:  PERRL, Sclera noninjected, nonicteric OU, Nares patent,  no craniofacial abnormalities, Mucous membranes, moist, no oral lesions, normal dentition  NECK: Trachea midline, no accessory muscle use, no stridor, no cervical or supraclavicular adenopathy, JVP not elevated  CARDIAC: Reg, single s1/S2, no m/r/g  PULM: CTA bilaterally no wheezing, rhonchi or rales  ABD: Normoactive bowel sounds, soft nontender, nondistended, no rebound, no rigidity, no guarding  EXT: No cyanosis, no clubbing, no edema, normal capillary refill  NEURO: no focal neurologic deficits, AAOx3, moving all extremities appropriately      Diagnostic Data:  Labs: I personally reviewed the most recent laboratory data pertinent to today's visit  I have personally reviewed pertinent lab results    Lab Results   Component Value Date    WBC 10 10 02/13/2016    WBC 10 10 02/13/2016    HGB 12 4 02/13/2016    HGB 12 4 02/13/2016    HCT 37 6 02/13/2016    HCT 37 6 02/13/2016    MCV 86 02/13/2016    MCV 86 02/13/2016     02/13/2016     02/13/2016     Lab Results   Component Value Date    GLUCOSE 122 02/10/2016    CALCIUM 8 0 (L) 02/13/2016    K 4 4 09/12/2019    CO2 24 09/12/2019     09/12/2019    BUN 17 09/12/2019    CREATININE 0 82 09/12/2019     No results found for: IGE  Lab Results   Component Value Date    ALT 71 02/09/2016    AST 96 (H) 02/09/2016    ALKPHOS 60 02/09/2016     Sleep studies:  CPAP with MSLT on CPAP:  Mr Annalisa Felder underwent a CPAP titration and was found to have a more optimal pressure of 10  Therefore, I recommend increasing min autoPAP pressure to 10 with max pressure of 20 using the [employer] interface  Evaluate compliance data in 1-2 months  In addition, he had a decreased sleep latency of 1 minute which implies hypersomnia  MSLT with CPAP pending       Compliance Data:  Type of CPAP:  autoPAP 5-10                                   Percent usage: 50%                                   Average time used: 4 hrs 15 mins                                  8 5L leak                                   Residual AHI: 2 6                                       Piotr Briseno DO

## 2019-12-11 ENCOUNTER — OFFICE VISIT (OUTPATIENT)
Dept: BARIATRICS | Facility: CLINIC | Age: 39
End: 2019-12-11
Payer: COMMERCIAL

## 2019-12-11 VITALS
WEIGHT: 313.4 LBS | TEMPERATURE: 98.1 F | RESPIRATION RATE: 17 BRPM | BODY MASS INDEX: 41.54 KG/M2 | HEART RATE: 75 BPM | HEIGHT: 73 IN | SYSTOLIC BLOOD PRESSURE: 122 MMHG | DIASTOLIC BLOOD PRESSURE: 60 MMHG

## 2019-12-11 DIAGNOSIS — R73.01 IMPAIRED FASTING GLUCOSE: ICD-10-CM

## 2019-12-11 DIAGNOSIS — E66.01 MORBID OBESITY WITH BMI OF 40.0-44.9, ADULT (HCC): Primary | ICD-10-CM

## 2019-12-11 DIAGNOSIS — I10 ESSENTIAL HYPERTENSION: ICD-10-CM

## 2019-12-11 PROCEDURE — 99214 OFFICE O/P EST MOD 30 MIN: CPT | Performed by: PHYSICIAN ASSISTANT

## 2019-12-11 PROCEDURE — 3078F DIAST BP <80 MM HG: CPT | Performed by: PHYSICIAN ASSISTANT

## 2019-12-11 PROCEDURE — 3074F SYST BP LT 130 MM HG: CPT | Performed by: PHYSICIAN ASSISTANT

## 2019-12-11 RX ORDER — METFORMIN HYDROCHLORIDE 750 MG/1
TABLET, EXTENDED RELEASE ORAL
Qty: 60 TABLET | Refills: 1 | Status: SHIPPED | OUTPATIENT
Start: 2019-12-11 | End: 2020-04-13 | Stop reason: SDUPTHER

## 2019-12-11 NOTE — ASSESSMENT & PLAN NOTE
-HgbA1c 5 7 in 8/2019  -avoid refined carbohydrates  -should improve with weight loss, dietary, and lifestyle changes  -patient agreeable to trial of metformin   Side effect profile reviewed

## 2019-12-11 NOTE — PROGRESS NOTES
Assessment/Plan: Morbid obesity with BMI of 40 0-44 9, adult [facility])  --Patient completed recent 8 week course of VLCD now pursuing conservative program  --Initial weight loss goal of 5-10% weight loss for improved health-met  -patient now utilizing partial meal replacement  Reports he has started food logging 1 week ago  Strongly encouraged consistency with this to help with mindfulness  Patient is doing a bit better with interval eating and being mindful about meal choices  -see goals section      Initial:332 lbs   Current: 313 4 lbs   Change:- 18 6 lbs  (+ 8 4 lbs since 10/8/19)    Hypertension  taking toprol-xl and cozaar  -should improve with weight loss, dietary, and lifestyle changes  -continue management with prescribing provider    Impaired fasting glucose  -HgbA1c 5 7 in 8/2019  -avoid refined carbohydrates  -should improve with weight loss, dietary, and lifestyle changes  -patient agreeable to trial of metformin  Side effect profile reviewed    Goals:    Food log (ie ) www myfitnesspal com,sparkpeople  com,loseit com,calorieking  com,etc    No sugary beverages  At least 64oz of water daily  Increase physical activity by 10 minutes daily  Gradually increase physical activity to a goal of 5 days per week for 30 minutes of MODERATE intensity PLUS 2 days per week of FULL BODY resistance training  1900 calories per day   5-10 servings of fruits and vegetables per day  Take water jug to work and shoot for goal of 64 oz  Please try to meal prep for dinner and avoid take out  Add serving of fruit, [employer] english muffin,  or greek yogurt to breakfast shake or bar     Follow up in approximately 2 months with Non-Surgical Physician/Advanced Practitioner  Diagnoses and all orders for this visit:    Morbid obesity with BMI of 40 0-44 9, adult ([address] )    Essential hypertension    Impaired fasting glucose  -     metFORMIN (GLUCOPHAGE-XR) 750 mg 24 hr tablet;  Take 1 tablet daily in AM for 2 weeks and then increase to 2 tablets daily in AM          Subjective:   Chief Complaint   Patient presents with    Follow-up     Patient is here for MWM follow-up  Patient ID: Kev Kirby  is a 42 y o  male with excess weight/obesity here to pursue weight managment  Patient is pursuing Conservative Program      HPI Presents for MWM follow up  Completed recent 8 week course of VLCD  Transitioned to partial on his own using 1 shake or bar per day  Reports his weight was as low as 302 on home scale but attended a holiday party this past weekend and consumed a a lot of ETOH and refined carbs  Started a new job 1 month ago and has a 4 month old at home  Has not been getting adequate sleep  [place] for 1 week  Food loggin calories per day for the past week  Fruit/Vegetable servings: 1-2 fruit and 1 veggie  Exercise: none  Hydration: 24oz of water, 2 cups of black coffee    B: shake or bar Or banana + 2 TBSP PB  S: skips  L: hello fresh meals ( 500-600 calories) Or grilled chicken salad + oil/vinegar  S: apple or skips  D: chili or chicken soup OR take out  S: skips     The following portions of the patient's history were reviewed and updated as appropriate: allergies, current medications, past family history, past medical history, past social history, past surgical history and problem list     Review of Systems   Constitutional: Positive for fatigue  Psychiatric/Behavioral:        Reports mood stable, denies depression       Objective:    /60 (BP Location: Left arm, Patient Position: Sitting, Cuff Size: Large)   Pulse 75   Temp 98 1 °F (36 7 °C) (Tympanic)   Resp 17   Ht 6' 0 5" (1 842 m)   Wt (!) 142 kg (313 lb 6 4 oz)   BMI 41 92 kg/m²      Physical Exam   Nursing note and vitals reviewed  Constitutional   General appearance: Abnormal   well developed and morbidly obese  Eyes No conjunctival pallor     Ears, Nose, Mouth, and Throat Oral mucosa moist    Pulmonary   Respiratory effort: No increased work of breathing or signs of respiratory distress  Auscultation of lungs: Clear to auscultation, equal breath sounds bilaterally, no wheezes, no rales, no rhonci  Cardiovascular   Auscultation of heart: Normal rate and rhythm, normal S1 and S2, without murmurs  Examination of extremities for edema and/or varicosities: Normal   no edema  Abdomen   Abdomen: Abnormal   The abdomen was obese  Bowel sounds were normal  The abdomen was soft and nontender     Musculoskeletal   Gait and station: Normal     Psychiatric   Orientation to person, place and time: Normal     Affect: appropriate

## 2019-12-11 NOTE — ASSESSMENT & PLAN NOTE
--Patient completed recent 8 week course of VLCD now pursuing conservative program  --Initial weight loss goal of 5-10% weight loss for improved health-met  -patient now utilizing partial meal replacement  Reports he has started food logging 1 week ago  Strongly encouraged consistency with this to help with mindfulness   Patient is doing a bit better with interval eating and being mindful about meal choices  -see goals section      Initial:332 lbs   Current: 313 4 lbs   Change:- 18 6 lbs  (+ 8 4 lbs since 10/8/19)

## 2019-12-11 NOTE — PATIENT INSTRUCTIONS
Goals: Food log (ie ) www myfitnesspal com,sparkpeople  com,Spotplexit com,calorieking  com,etc    No sugary beverages  At least 64oz of water daily  Increase physical activity by 10 minutes daily   Gradually increase physical activity to a goal of 5 days per week for 30 minutes of MODERATE intensity PLUS 2 days per week of FULL BODY resistance training  1900 calories per day   5-10 servings of fruits and vegetables per day  Take water jug to work and shoot for goal of 64 oz  Please try to meal prep for dinner and avoid take out  Add serving of fruit, [employer] english muffin,  or greek yogurt to breakfast shake or bar

## 2019-12-24 ENCOUNTER — OFFICE VISIT (OUTPATIENT)
Dept: OBGYN CLINIC | Facility: CLINIC | Age: 39
End: 2019-12-24
Payer: COMMERCIAL

## 2019-12-24 VITALS
BODY MASS INDEX: 42.26 KG/M2 | SYSTOLIC BLOOD PRESSURE: 143 MMHG | HEIGHT: 72 IN | WEIGHT: 312 LBS | DIASTOLIC BLOOD PRESSURE: 76 MMHG | HEART RATE: 108 BPM

## 2019-12-24 DIAGNOSIS — M54.41 BILATERAL LOW BACK PAIN WITH BILATERAL SCIATICA, UNSPECIFIED CHRONICITY: Primary | ICD-10-CM

## 2019-12-24 DIAGNOSIS — M54.42 BILATERAL LOW BACK PAIN WITH BILATERAL SCIATICA, UNSPECIFIED CHRONICITY: Primary | ICD-10-CM

## 2019-12-24 DIAGNOSIS — J01.90 ACUTE SINUSITIS, RECURRENCE NOT SPECIFIED, UNSPECIFIED LOCATION: Primary | ICD-10-CM

## 2019-12-24 PROCEDURE — 99214 OFFICE O/P EST MOD 30 MIN: CPT | Performed by: ORTHOPAEDIC SURGERY

## 2019-12-24 RX ORDER — AMOXICILLIN AND CLAVULANATE POTASSIUM 875; 125 MG/1; MG/1
1 TABLET, FILM COATED ORAL EVERY 12 HOURS SCHEDULED
Qty: 20 TABLET | Refills: 0 | Status: SHIPPED | OUTPATIENT
Start: 2019-12-24 | End: 2020-01-03

## 2019-12-24 NOTE — PROGRESS NOTES
Assessment/Plan:  1  Bilateral low back pain with bilateral sciatica, unspecified chronicity  Ambulatory referral to Physical Therapy       Scribe Attestation    I,:   Yisel Bhakta am acting as a scribe while in the presence of the attending physician :        I,:   Aneta Powell, DO personally performed the services described in this documentation    as scribed in my presence :              Jaimie Chamorro has low back pain with bilateral intermittent radiculopathy  We discussed that he has great strength bilaterally and he has no concerning factors on exam today for any disc herniation or neurological issues  I would like him to begin with physical therapy  He was given a prescription for physical therapy at today's appointment  He can heat the area and take OTC anti-inflammatories as needed  If he has continued pain or increased pain after physical therapy we can discuss if imaging is needed at this time  He will follow up in 6 weeks if he has continued pain  Subjective:   Caterina Casiano is a 42 y o  male who presents to the office today for low back pain with intermittent radiculopathy in bilateral legs  He reports experiencing back pain back in June after splitting wood and lifting about 200 lbs of wood  He thought the pain would go away but it has just continued with no improvement  He now experiences mild to moderate back pain that worsens when he stands  He reports increased symptoms on the left leg compared to the right  He reports intermittent numbness in his hamstrings, low back and left calf  He also reports pain in both of his shins but is unsure if that is related  He feels like his hamstrings are extremely tight as well as his back  He denies any direct trauma to the back or legs  Review of Systems   Constitutional: Negative for chills, fever and unexpected weight change  HENT: Negative for hearing loss, nosebleeds and sore throat  Eyes: Negative for pain, redness and visual disturbance  Respiratory: Negative for cough, shortness of breath and wheezing  Cardiovascular: Negative for chest pain, palpitations and leg swelling  Gastrointestinal: Negative for abdominal pain, nausea and vomiting  Endocrine: Negative for polyphagia and polyuria  Genitourinary: Negative for dysuria and hematuria  Musculoskeletal: Positive for myalgias  See HPI   Skin: Negative for rash and wound  Neurological: Negative for dizziness, numbness and headaches  Psychiatric/Behavioral: Negative for decreased concentration and suicidal ideas  The patient is not nervous/anxious  Past Medical History:   Diagnosis Date    ADHD (attention deficit hyperactivity disorder)     Hypersomnolence     Hypertension     Hypoxia     Infrapatellar bursitis of right knee     Mycobacterial infectious disease     JAMIE (obstructive sleep apnea)        Past Surgical History:   Procedure Laterality Date    CARPAL TUNNEL RELEASE Bilateral     TONSILLECTOMY         Family History   Problem Relation Age of Onset    Hypertension Mother     Diabetes Mother     Heart attack Father     Diabetes Father     Hypertension Father     Obesity Father         hx LRYGB    Heart disease Father         hx MI age 36   Junius Shell Arthritis Family     Cancer Family     Cancer Paternal Grandmother         hx bladder cancer    Stroke Neg Hx     Thyroid disease Neg Hx        Social History     Occupational History    Not on file   Tobacco Use    Smoking status: Never Smoker    Smokeless tobacco: Never Used   Substance and Sexual Activity    Alcohol use: Yes     Comment: rare    Drug use: No    Sexual activity: Not on file         Current Outpatient Medications:     escitalopram (LEXAPRO) 10 mg tablet, Take 10 mg by mouth daily  , Disp: , Rfl:     fexofenadine (ALLEGRA) 180 MG tablet, Take 180 mg by mouth as needed  , Disp: , Rfl:     lisdexamfetamine (VYVANSE) 30 MG capsule, Take 50 mg by mouth every morning , Disp: , Rfl:   losartan (COZAAR) 100 MG tablet, Take 100 mg by mouth daily  , Disp: , Rfl:     metFORMIN (GLUCOPHAGE-XR) 750 mg 24 hr tablet, Take 1 tablet daily in AM for 2 weeks and then increase to 2 tablets daily in AM, Disp: 60 tablet, Rfl: 1    metoprolol succinate (TOPROL-XL) 50 mg 24 hr tablet, Take 50 mg by mouth daily, Disp: , Rfl: 1    VYVANSE 50 MG capsule, Take 50 mg by mouth daily, Disp: , Rfl: 0    Allergies   Allergen Reactions    Olmesartan-Amlodipine-Hctz Edema    Terazosin Edema       Objective:  Vitals:    12/24/19 1116   BP: 143/76   Pulse: (!) 108       Back Exam     Tenderness   The patient is experiencing tenderness in the lumbar (paraspinal lumbar L5- S1)  Range of Motion   The patient has normal back ROM  Back extension: Tightness no pain      Muscle Strength   Right Quadriceps:  5/5   Left Quadriceps:  5/5   Right Hamstrings:  5/5   Left Hamstrings:  5/5     Tests   Straight leg raise right: negative  Straight leg raise left: negative    Other   Sensation: normal  Gait: normal     Comments:  5/5 bilateral great toe strength             Physical Exam   Constitutional: He is oriented to person, place, and time  He appears well-developed and well-nourished  HENT:   Head: Normocephalic and atraumatic  Eyes: Pupils are equal, round, and reactive to light  Conjunctivae are normal    Neck: Normal range of motion  Neck supple  Cardiovascular: Normal rate and intact distal pulses  Pulmonary/Chest: Effort normal  No respiratory distress  Musculoskeletal:   As noted in HPI   Neurological: He is alert and oriented to person, place, and time  Skin: Skin is warm and dry  Psychiatric: He has a normal mood and affect  His behavior is normal    Vitals reviewed

## 2019-12-24 NOTE — PROGRESS NOTES
Patient wife called  He has been having green mucus production, sinus pressure, fever of 101 and headache

## 2020-01-05 ENCOUNTER — OFFICE VISIT (OUTPATIENT)
Dept: URGENT CARE | Facility: CLINIC | Age: 40
End: 2020-01-05
Payer: COMMERCIAL

## 2020-01-05 VITALS
DIASTOLIC BLOOD PRESSURE: 60 MMHG | BODY MASS INDEX: 43.37 KG/M2 | OXYGEN SATURATION: 97 % | TEMPERATURE: 101.1 F | SYSTOLIC BLOOD PRESSURE: 110 MMHG | WEIGHT: 315 LBS | RESPIRATION RATE: 16 BRPM | HEART RATE: 100 BPM

## 2020-01-05 DIAGNOSIS — J11.1 INFLUENZA: Primary | ICD-10-CM

## 2020-01-05 PROBLEM — M70.51 INFRAPATELLAR BURSITIS OF RIGHT KNEE: Status: ACTIVE | Noted: 2017-12-12

## 2020-01-05 PROBLEM — G47.10 HYPERSOMNOLENCE: Status: ACTIVE | Noted: 2017-12-22

## 2020-01-05 PROCEDURE — 99213 OFFICE O/P EST LOW 20 MIN: CPT | Performed by: FAMILY MEDICINE

## 2020-01-05 RX ORDER — OSELTAMIVIR PHOSPHATE 75 MG/1
75 CAPSULE ORAL 2 TIMES DAILY
Qty: 10 CAPSULE | Refills: 0 | Status: SHIPPED | OUTPATIENT
Start: 2020-01-05 | End: 2020-01-10

## 2020-01-05 NOTE — PROGRESS NOTES
[facility]        NAME: Lavern Brandt is a 42 y o  male  : 1980    MRN: [id number]  DATE: 2020  TIME: 5:00 PM    Assessment and Plan   Influenza [J11 1]  1  Influenza  oseltamivir (TAMIFLU) 75 mg capsule         Patient Instructions     Patient Instructions   1  Influenza  - Tamiflu prescribed, complete as directed  Side effects discussed, appropriate warnings given  Informed patient that if any behavioral side effects occur, instructions are to discontinue medication and call 911 immediately  - rest and drink plenty of fluids  - take Tylenol or Motrin as needed   - run a humidifier at home   - try warm salt water gargles and throat lozenges as needed   - advised to use Flonase nasal spray  - may take OTC cold medications as needed   - if symptoms persist despite treatment, worsen, or any new symptoms present, should be seen in the ER  Follow up with PCP in 3-5 days  Proceed to  ER if symptoms worsen  Chief Complaint     Chief Complaint   Patient presents with    Fever    Cold Like Symptoms         History of Present Illness       41 yo male presents c/o fever, chills, headache, body aches, nasal congestion, sore throat, and productive cough x 24 hours  No chest pain, SOB, or wheezing  Non-smoker  No GI sx  No skin rashes  He did get the flu vaccine this year  He has been taking Mucinex-DM, Tylenol, and Robitussin as needed  Review of Systems   Review of Systems   Constitutional: Positive for chills and fever  As noted in HPI   HENT:        As noted in HPI   Eyes: Negative  Respiratory:        As noted in HPI   Cardiovascular: Negative  Gastrointestinal: Negative  Musculoskeletal:        As noted in HPI   Skin: Negative  Neurological: Negative  Current Medications       Current Outpatient Medications:     escitalopram (LEXAPRO) 10 mg tablet, Take 10 mg by mouth daily  , Disp: , Rfl:     fexofenadine (ALLEGRA) 180 MG tablet, Take 180 mg by mouth as needed  , Disp: , Rfl:     losartan (COZAAR) 100 MG tablet, Take 100 mg by mouth daily  , Disp: , Rfl:     metoprolol succinate (TOPROL-XL) 50 mg 24 hr tablet, Take 50 mg by mouth daily, Disp: , Rfl: 1    VYVANSE 50 MG capsule, Take 50 mg by mouth daily, Disp: , Rfl: 0    metFORMIN (GLUCOPHAGE-XR) 750 mg 24 hr tablet, Take 1 tablet daily in AM for 2 weeks and then increase to 2 tablets daily in AM (Patient not taking: Reported on 1/5/2020), Disp: 60 tablet, Rfl: 1    oseltamivir (TAMIFLU) 75 mg capsule, Take 1 capsule (75 mg total) by mouth 2 (two) times a day for 5 days, Disp: 10 capsule, Rfl: 0    Current Allergies     Allergies as of 01/05/2020 - Reviewed 01/05/2020   Allergen Reaction Noted    Olmesartan-amlodipine-hctz Edema 12/23/2015    Terazosin Edema 12/23/2015            The following portions of the patient's history were reviewed and updated as appropriate: allergies, current medications, past family history, past medical history, past social history, past surgical history and problem list      Past Medical History:   Diagnosis Date    ADHD (attention deficit hyperactivity disorder)     Hypersomnolence     Hypertension     Hypoxia     Infrapatellar bursitis of right knee     Mycobacterial infectious disease     JAMIE (obstructive sleep apnea)        Past Surgical History:   Procedure Laterality Date    CARPAL TUNNEL RELEASE Bilateral     TONSILLECTOMY         Family History   Problem Relation Age of Onset    Hypertension Mother     Diabetes Mother     Heart attack Father     Diabetes Father     Hypertension Father     Obesity Father         hx LRYGB    Heart disease Father         hx MI age 36   Emaline Folds Arthritis Family     Cancer Family     Cancer Paternal Grandmother         hx bladder cancer    Stroke Neg Hx     Thyroid disease Neg Hx          Medications have been verified          Objective   /60   Pulse 100   Temp (!) 101 1 °F (38 4 °C) (Tympanic)   Resp 16 Wt (!) 145 kg (319 lb 12 8 oz)   SpO2 97%   BMI 43 37 kg/m²        Physical Exam     Physical Exam   Constitutional: He is oriented to person, place, and time  He appears well-developed and well-nourished  He is active and cooperative  Non-toxic appearance  He appears ill  No distress  Fever of 101 1    HENT:   Head: Normocephalic and atraumatic  Right Ear: Tympanic membrane, external ear and ear canal normal    Left Ear: Tympanic membrane, external ear and ear canal normal    Nose: Nose normal  Right sinus exhibits no maxillary sinus tenderness and no frontal sinus tenderness  Left sinus exhibits no maxillary sinus tenderness and no frontal sinus tenderness  Mouth/Throat: Uvula is midline, oropharynx is clear and moist and mucous membranes are normal    Eyes: Conjunctivae and EOM are normal    Neck: Normal range of motion and full passive range of motion without pain  Neck supple  No neck rigidity  No edema, no erythema and normal range of motion present  Cardiovascular: Normal rate, regular rhythm and normal heart sounds  Pulmonary/Chest: Effort normal and breath sounds normal  No respiratory distress  Lymphadenopathy:     He has no cervical adenopathy  Neurological: He is alert and oriented to person, place, and time  Skin: He is not diaphoretic  Psychiatric: He has a normal mood and affect  His behavior is normal  Judgment and thought content normal    Nursing note and vitals reviewed

## 2020-01-05 NOTE — LETTER
January 5, 2020     Patient: Monroe Peeling   YOB: 1980   Date of Visit: 1/5/2020       To Whom it May Concern:    Monroe Peeling was seen in my clinic on 1/5/2020  Please excuse from work for 01/06/2020  If you have any questions or concerns, please don't hesitate to call           Sincerely,          Jyoti Ortiz MD

## 2020-01-05 NOTE — PATIENT INSTRUCTIONS
1  Influenza  - Tamiflu prescribed, complete as directed  Side effects discussed, appropriate warnings given  Informed patient that if any behavioral side effects occur, instructions are to discontinue medication and call 911 immediately  - rest and drink plenty of fluids  - take Tylenol or Motrin as needed   - run a humidifier at home   - try warm salt water gargles and throat lozenges as needed   - advised to use Flonase nasal spray  - may take OTC cold medications as needed   - if symptoms persist despite treatment, worsen, or any new symptoms present, should be seen in the ER

## 2020-01-15 ENCOUNTER — OFFICE VISIT (OUTPATIENT)
Dept: URGENT CARE | Facility: CLINIC | Age: 40
End: 2020-01-15
Payer: COMMERCIAL

## 2020-01-15 ENCOUNTER — APPOINTMENT (OUTPATIENT)
Dept: RADIOLOGY | Facility: CLINIC | Age: 40
End: 2020-01-15
Payer: COMMERCIAL

## 2020-01-15 VITALS
HEART RATE: 110 BPM | DIASTOLIC BLOOD PRESSURE: 52 MMHG | OXYGEN SATURATION: 99 % | BODY MASS INDEX: 43.13 KG/M2 | SYSTOLIC BLOOD PRESSURE: 122 MMHG | TEMPERATURE: 102.7 F | WEIGHT: 315 LBS | RESPIRATION RATE: 16 BRPM

## 2020-01-15 DIAGNOSIS — R09.89 CHEST CONGESTION: ICD-10-CM

## 2020-01-15 DIAGNOSIS — H65.192 OTHER NON-RECURRENT ACUTE NONSUPPURATIVE OTITIS MEDIA OF LEFT EAR: Primary | ICD-10-CM

## 2020-01-15 DIAGNOSIS — B34.9 VIRAL SYNDROME: ICD-10-CM

## 2020-01-15 PROCEDURE — 99214 OFFICE O/P EST MOD 30 MIN: CPT | Performed by: PHYSICIAN ASSISTANT

## 2020-01-15 PROCEDURE — 87631 RESP VIRUS 3-5 TARGETS: CPT | Performed by: PHYSICIAN ASSISTANT

## 2020-01-15 PROCEDURE — 71046 X-RAY EXAM CHEST 2 VIEWS: CPT

## 2020-01-15 RX ORDER — OSELTAMIVIR PHOSPHATE 75 MG/1
75 CAPSULE ORAL EVERY 12 HOURS SCHEDULED
Qty: 10 CAPSULE | Refills: 0 | Status: SHIPPED | OUTPATIENT
Start: 2020-01-15 | End: 2020-01-15

## 2020-01-15 RX ORDER — ALBUTEROL SULFATE 90 UG/1
2 AEROSOL, METERED RESPIRATORY (INHALATION) EVERY 6 HOURS PRN
Qty: 8.5 G | Refills: 0 | Status: SHIPPED | OUTPATIENT
Start: 2020-01-15 | End: 2020-01-15

## 2020-01-15 RX ORDER — ALBUTEROL SULFATE 90 UG/1
2 AEROSOL, METERED RESPIRATORY (INHALATION) EVERY 6 HOURS PRN
Qty: 8.5 G | Refills: 0 | Status: SHIPPED | OUTPATIENT
Start: 2020-01-15 | End: 2020-02-24 | Stop reason: ALTCHOICE

## 2020-01-15 RX ORDER — CEFDINIR 300 MG/1
300 CAPSULE ORAL EVERY 12 HOURS SCHEDULED
Qty: 20 CAPSULE | Refills: 0 | Status: SHIPPED | OUTPATIENT
Start: 2020-01-15 | End: 2020-01-15

## 2020-01-15 RX ORDER — CEFDINIR 300 MG/1
300 CAPSULE ORAL EVERY 12 HOURS SCHEDULED
Qty: 14 CAPSULE | Refills: 0 | Status: SHIPPED | OUTPATIENT
Start: 2020-01-15 | End: 2020-01-15

## 2020-01-15 RX ORDER — OSELTAMIVIR PHOSPHATE 75 MG/1
75 CAPSULE ORAL EVERY 12 HOURS SCHEDULED
Qty: 10 CAPSULE | Refills: 0 | Status: SHIPPED | OUTPATIENT
Start: 2020-01-15 | End: 2020-01-20

## 2020-01-15 RX ORDER — CEFDINIR 300 MG/1
300 CAPSULE ORAL EVERY 12 HOURS SCHEDULED
Qty: 20 CAPSULE | Refills: 0 | Status: SHIPPED | OUTPATIENT
Start: 2020-01-15 | End: 2020-01-25

## 2020-01-15 NOTE — LETTER
January 15, 2020     Patient: Denise Lugo   YOB: 1980   Date of Visit: 1/15/2020       To Whom It May Concern: It is my medical opinion that Denise Lugo may return to work when fever free for 24 hours without the use of fever reducing medications  If you have any questions or concerns, please don't hesitate to call           Sincerely,        Marty Jon PA-C

## 2020-01-16 ENCOUNTER — TELEPHONE (OUTPATIENT)
Dept: URGENT CARE | Facility: CLINIC | Age: 40
End: 2020-01-16

## 2020-01-16 LAB
FLUAV RNA NPH QL NAA+PROBE: ABNORMAL
FLUBV RNA NPH QL NAA+PROBE: ABNORMAL
RSV RNA NPH QL NAA+PROBE: DETECTED

## 2020-01-16 NOTE — PROGRESS NOTES
[facility]        NAME: Denise Lugo is a 42 y o  male  : 1980    MRN: [id number]  DATE: January 15, 2020  TIME: 8:12 PM    Assessment and Plan   Chest congestion [R09 89]  1  Chest congestion  XR chest pa & lateral    Influenza A/B and RSV by PCR    albuterol (PROAIR HFA) 90 mcg/act inhaler   2  Other non-recurrent acute nonsuppurative otitis media of left ear  cefdinir (OMNICEF) 300 mg capsule       Patient Instructions     Take the antibiotic as directed with food and water  Take a probiotic while taking this medication  Mucinex DM for nasal congestion and cough  Nasal saline spray or Neti-pot to rinse the nasal passages  Tylenol or Motrin may be taken for fever and discomfort  Check your package labeling as some cold medications already contain fever reducers  Saltwater gargles, warm tea with honey, throat lozenges, and steam showers may help to reduce coughing fits  Use a cool mist humidifier at bedtime, turning on hours prior to bed with your bedroom doors shut for maximum relief  Follow up with your family doctor in 3-5 days if symptoms persist   Proceed to the ER if symptoms worsen  Discussed with patient we will swab for flu/RSV  Tamiflu provided  Patient unsure if he will start tonight and thinks he may wait until results confirmed tomorrow  Will treat with cefdinir to cover for ear infection as well as any possible bacterial lower respiratory infection causing basilar rhonchi  Inhaler provided and use reviewed  Discussed benefits and risks of initiating Tamiflu therapy  Side effects of this medication were reviewed including potential psychological side effects  Discussed the potential out-of-pocket cost for the flu swab  Pt would like rx for tamiflu and is unsure if he will start or await results tomorrow  Would like flu swab for confirmation  The diagnoses, expected course of illness, and tx plan reviewed  All questions answered  Precautions given    Patient verbalized understanding and agreement with the treatment plan  Chief Complaint     Chief Complaint   Patient presents with    Cold Like Symptoms     pt presents with fever that comes and goes for 3 weeks; scratchy throat, fever, chills, headache, faitgue, started this morning         History of Present Illness       17-year-old male accompanied by his wife presenting with complaint of fever x today  Patient notes that multiple viruses have been going around his household noting that this is the 3rd time he has been sick in the past month to month and half  Reports complete symptom resolution between episodes lasting at least 1 week  Today developed fever, Tmax 102 7, chills, fatigue, nasal congestion, runny nose, sore throat, and a nonproductive cough  Reports generalized body aches, lightheadedness, and dizziness  No ear pain, wheezing, SOB, CP, palpitations, N/V/D  He was seen in this office 10 days ago and diagnosed with the flu  Notes he did complete Tamiflu but had felt better roughly 2-3 days following this visit  Shortly after this visit and within the past week, his son was diagnosed with confirmed RSV  Reports multiple sick contacts at work  Recent travel within the past 3-4 weeks to [place]  He is a nonsmoker  Did have the flu shot  Review of Systems   Review of Systems   Constitutional: Positive for chills, fatigue and fever  Negative for diaphoresis  HENT: Positive for congestion, rhinorrhea and sore throat  Negative for ear pain  Respiratory: Positive for cough  Negative for shortness of breath and wheezing  Cardiovascular: Negative for chest pain and palpitations  Gastrointestinal: Negative for abdominal pain, diarrhea, nausea and vomiting  Musculoskeletal: Positive for myalgias  Skin: Negative for rash  Neurological: Positive for light-headedness and headaches           Current Medications       Current Outpatient Medications:     escitalopram (LEXAPRO) 10 mg tablet, Take 10 mg by mouth daily  , Disp: , Rfl:     fexofenadine (ALLEGRA) 180 MG tablet, Take 180 mg by mouth as needed  , Disp: , Rfl:     losartan (COZAAR) 100 MG tablet, Take 100 mg by mouth daily  , Disp: , Rfl:     metoprolol succinate (TOPROL-XL) 50 mg 24 hr tablet, Take 50 mg by mouth daily, Disp: , Rfl: 1    VYVANSE 50 MG capsule, Take 50 mg by mouth daily, Disp: , Rfl: 0    albuterol (PROAIR HFA) 90 mcg/act inhaler, Inhale 2 puffs every 6 (six) hours as needed for wheezing, Disp: 8 5 g, Rfl: 0    cefdinir (OMNICEF) 300 mg capsule, Take 1 capsule (300 mg total) by mouth every 12 (twelve) hours for 7 days, Disp: 14 capsule, Rfl: 0    metFORMIN (GLUCOPHAGE-XR) 750 mg 24 hr tablet, Take 1 tablet daily in AM for 2 weeks and then increase to 2 tablets daily in AM (Patient not taking: Reported on 1/15/2020), Disp: 60 tablet, Rfl: 1    Current Allergies     Allergies as of 01/15/2020 - Reviewed 01/15/2020   Allergen Reaction Noted    Olmesartan-amlodipine-hctz Edema 12/23/2015    Terazosin Edema 12/23/2015            The following portions of the patient's history were reviewed and updated as appropriate: allergies, current medications, past family history, past medical history, past social history, past surgical history and problem list      Past Medical History:   Diagnosis Date    ADHD (attention deficit hyperactivity disorder)     Hypersomnolence     Hypertension     Hypoxia     Infrapatellar bursitis of right knee     Mycobacterial infectious disease     JAMIE (obstructive sleep apnea)        Past Surgical History:   Procedure Laterality Date    CARPAL TUNNEL RELEASE Bilateral     TONSILLECTOMY         Family History   Problem Relation Age of Onset    Hypertension Mother     Diabetes Mother     Heart attack Father     Diabetes Father     Hypertension Father     Obesity Father         hx LRYGB    Heart disease Father         hx MI age 36   [place] 89 Cancer Family     Cancer Paternal Grandmother         hx bladder cancer    Stroke Neg Hx     Thyroid disease Neg Hx      Medications have been verified  Objective   /52   Pulse (!) 110   Temp (!) 102 7 °F (39 3 °C)   Resp 16   Wt (!) 144 kg (318 lb)   SpO2 99%   BMI 43 13 kg/m²     CXR: no acute cardiopulmonary abnormality  Physical Exam     Physical Exam   Constitutional: Vital signs are normal  He appears well-developed and well-nourished  He is cooperative  He appears ill  No distress  HENT:   Head: Normocephalic and atraumatic  Right Ear: Hearing, tympanic membrane, external ear and ear canal normal    Left Ear: Hearing, external ear and ear canal normal  Tympanic membrane is erythematous and bulging  Nose: Mucosal edema and rhinorrhea (clear mucoid) present  Mouth/Throat: Uvula is midline and mucous membranes are normal  Mucous membranes are not pale, not dry and not cyanotic  No oral lesions  No uvula swelling  Posterior oropharyngeal erythema present  No oropharyngeal exudate, posterior oropharyngeal edema or tonsillar abscesses  Tonsils are 1+ on the right  Tonsils are 1+ on the left  Eyes: Conjunctivae and lids are normal  Right eye exhibits no discharge and no exudate  Left eye exhibits no discharge and no exudate  Neck: Trachea normal and phonation normal  Neck supple  No tracheal tenderness present  No neck rigidity  No edema and no erythema present  Cardiovascular: Regular rhythm and normal heart sounds  Tachycardia present  Exam reveals no distant heart sounds  Pulmonary/Chest: Effort normal  No stridor  No respiratory distress  He has decreased breath sounds (of b/l bases)  He has no wheezes  He has rhonchi (Coarse rhonchi of bases, decrease with coughing  )  He has no rales  Abdominal: Soft  Bowel sounds are normal  He exhibits no distension and no mass  There is no tenderness  There is no rigidity, no rebound and no guarding  Lymphadenopathy:     He has cervical adenopathy  Neurological: He is alert  He is not disoriented  No cranial nerve deficit  Coordination and gait normal    Skin: Skin is warm, dry and intact  No rash noted  He is not diaphoretic  No erythema  No pallor  Psychiatric: He has a normal mood and affect  His behavior is normal  Judgment and thought content normal    Nursing note and vitals reviewed

## 2020-01-16 NOTE — PATIENT INSTRUCTIONS
Take the antibiotic as directed with food and water  Take a probiotic while taking this medication  Mucinex DM for nasal congestion and cough  Nasal saline spray or Neti-pot to rinse the nasal passages  Tylenol or Motrin may be taken for fever and discomfort  Check your package labeling as some cold medications already contain fever reducers  Saltwater gargles, warm tea with honey, throat lozenges, and steam showers may help to reduce coughing fits  Use a cool mist humidifier at bedtime, turning on hours prior to bed with your bedroom doors shut for maximum relief  Follow up with your family doctor in 3-5 days if symptoms persist   Proceed to the ER if symptoms worsen

## 2020-01-20 ENCOUNTER — EVALUATION (OUTPATIENT)
Dept: PHYSICAL THERAPY | Facility: CLINIC | Age: 40
End: 2020-01-20
Payer: COMMERCIAL

## 2020-01-20 DIAGNOSIS — M54.42 BILATERAL LOW BACK PAIN WITH BILATERAL SCIATICA, UNSPECIFIED CHRONICITY: ICD-10-CM

## 2020-01-20 DIAGNOSIS — M54.41 BILATERAL LOW BACK PAIN WITH BILATERAL SCIATICA, UNSPECIFIED CHRONICITY: ICD-10-CM

## 2020-01-20 PROCEDURE — 97161 PT EVAL LOW COMPLEX 20 MIN: CPT

## 2020-01-20 NOTE — PROGRESS NOTES
PT Evaluation     Today's date: 2020  Patient name: Chaim Garcia  : 1980  MRN: [id number]  Referring provider: Lilo Duenas  Dx:   Encounter Diagnosis     ICD-10-CM    1  Bilateral low back pain with bilateral sciatica, unspecified chronicity M54 42 Ambulatory referral to Physical Therapy    M54 41 PT plan of care cert/re-cert       Start Time: 1730  Stop Time: 1820  Total time in clinic (min): 50 minutes    Assessment  Assessment details: Chaim Garcia is a 42 y o  male who presents with pain, decreased strength, ambulatory dysfunction and postural  dysfunction  Due to these impairments, patient has difficulty performing ADL's, recreational activities, work-related activities, ambulation, lifting/carrying, transfers  Patient's clinical presentation is consistent with their referring diagnosis of Bilateral low back pain with bilateral sciatica, unspecified chronicity Plan: Ambulatory referral to Physical Therapy  Patient has been educated in home exercise program and plan of care  Patient would benefit from skilled physical therapy services to address their aforementioned functional limitations and progress towards prior level of function and independence with home exercise program      Impairments: abnormal gait, abnormal muscle firing, abnormal or restricted ROM, activity intolerance, impaired physical strength, lacks appropriate home exercise program, pain with function, poor posture  and poor body mechanics  Understanding of Dx/Px/POC: good   Prognosis: good    Goals  Short Term Goals: Target Date 2020  1  Initiate and advance HEP  2  Increase bilateral LE MMT by at least 1/2 grade   3  Pt will be able to ambulate community distances without pain or gross deviation  4  Pt will be able to squat to approx 80 deg knee flex using proper form with minimal PT cuing  5   Pt will be able to lift a 20 lb object from the floor to waist height using proper form with PT cuing      Long Term Goals: Target Date 3/20/2020  1  Indep with HEP  2  Increase bilateral LE MMT to at least 4+/5 in all planes  3  Pt will be able to demonstrate a full squat using proper form without PT cuing  4  Pt will be able to lift a 40 lb object from the floor to waist height using proper form without PT cuing  5  Pt will be able to return to PLOF including all work related activity as a  without limitation      Plan  Patient would benefit from: skilled PT  Planned modality interventions: cryotherapy, electrical stimulation/Russian stimulation and thermotherapy: hydrocollator packs  Planned therapy interventions: joint mobilization, manual therapy, patient education, postural training, activity modification, abdominal trunk stabilization, body mechanics training, flexibility, functional ROM exercises, graded exercise, home exercise program, neuromuscular re-education, strengthening, stretching, therapeutic activities, therapeutic exercise, motor coordination training, muscle pump exercises, gait training, balance/weight bearing training and ADL training  Frequency: 3x week  Duration in weeks: 8  Plan of Care beginning date: 2020  Plan of Care expiration date: 3/20/2020  Treatment plan discussed with: patient        Subjective Evaluation    History of Present Illness  Mechanism of injury: Pt reports in 2019 he was splitting and carrying large amounts of wood and reports as he progressed, his back began to tighten  States this had been previously happening for about 3 years with [employer] of other high level activity  Pt reports the pain has been increasing since that time, and states he also began experiencing paresthesia more in the front of the L>R LE  He had a previous MRI (disc compression) many years ago, but no recent imaging  States his pain is increased with forward bending, prolonged walking, sit to stand transfers, lifting, carrying  and prolonged sitting/driving    Pt was seen by Dr Davion Allred who prescribed OPPT  Quality of life: good    Pain  Current pain ratin  At best pain ratin (Lying on back)  At worst pain ratin  Location: R Low back (Radiates to L shin)  Quality: sharp  Relieving factors: rest (Lying)  Aggravating factors: lifting, walking and sitting (Bending,carrying)  Progression: worsening    Social Support  Lives with: young children and spouse    Employment status: working (Works at a car [employer])  Hand dominance: left    Treatments  No previous or current treatments  Patient Goals  Patient goals for therapy: decreased pain and increased strength  Patient goal: Improve core strength        Objective     Concurrent Complaints  Negative for bladder dysfunction and bowel dysfunction    Static Posture     Lumbar Spine   Flattened  Palpation   Left   Hypertonic in the lumbar paraspinals  Tenderness of the lumbar paraspinals  Right   Hypertonic in the lumbar paraspinals  Tenderness of the lumbar paraspinals and quadratus lumborum  Neurological Testing     Sensation     Lumbar   Left   Intact: light touch    Right   Intact: light touch    Active Range of Motion     Lumbar   Flexion: Active lumbar flexion: Fingertips to floor  WFL  Extension: Active lumbar extension: Low back tightness  WFL  Left lateral flexion:  WFL  Right lateral flexion:  WFL  Left rotation:  WFL  Right rotation:  Restriction level: minimal    Passive Range of Motion   Left Hip   Flexion: WFL  Abduction: WFL  External rotation (90/90): [place] [place]  Internal rotation (90/90): WFL    Right Hip   Flexion: WFL  Abduction: [facility] [place]  External rotation (90/90): [facility] [place]  Internal rotation (90/90):  [place] [place]    Joint Play   Joints within functional limits: L1, L2, L3, L4 and L5     Strength/Myotome Testing     Left Hip   Planes of Motion   Flexion: 4+  Extension: 4 (LBP)  Abduction: 4  Adduction: 4 (LBP)    Right Hip   Planes of Motion   Flexion: 4  Extension: 4  Abduction: 4  Adduction: 4+    Left Knee   Normal strength    Right Knee   Normal strength    Left Ankle/Foot   Normal strength    Right Ankle/Foot   Dorsiflexion: 5  Plantar flexion: 4+    Tests     Lumbar     Left   Positive femoral stretch and slump test      Left Pelvic Girdle/Sacrum   Positive: active SLR test      Ambulation     Observational Gait     Additional Observational Gait Details  Pt ambulates with a mostly normalized pattern other than decreased trunk rotation angie    Functional Assessment        Forward Step Up 8"   Left Leg  Within functional limits  Right Leg  Within functional limits  Forward Step Down 8"   Left Leg  Within functional limits  Right Leg  Within functional limits  Single Leg Stance   Left: 20 seconds  Right: 20 seconds    Comments  Squat:   Pt is able to squat to approx 75 deg knee flexion demonstrating marked increase in trunk flexion  Pt reports of increased R LBP and radiating pain to L shin        Flowsheet Rows      Most Recent Value   PT/OT G-Codes   Current Score  71   Projected Score  78   FOTO information reviewed  Yes   Assessment Type  Evaluation             Precautions: Standard    Specialty Daily Treatment Diary       Manual 1/20/20       STM        L/S p-a mobs        Man Flexibility        MET                        Exercise Diary         Sciatic nerve glides 10x5" angie       TA activation 10x5"       Bridges 10x5"       SLR abd  10x3" angie                                                                                                               Modalities

## 2020-01-21 DIAGNOSIS — F90.9 ATTENTION DEFICIT HYPERACTIVITY DISORDER (ADHD), UNSPECIFIED ADHD TYPE: ICD-10-CM

## 2020-01-21 DIAGNOSIS — R05.9 COUGH: Primary | ICD-10-CM

## 2020-01-21 RX ORDER — LISDEXAMFETAMINE DIMESYLATE 50 MG
50 CAPSULE ORAL DAILY
Qty: 30 CAPSULE | Refills: 0 | Status: SHIPPED | OUTPATIENT
Start: 2020-01-21 | End: 2020-02-19 | Stop reason: SDUPTHER

## 2020-01-21 RX ORDER — BENZONATATE 200 MG/1
200 CAPSULE ORAL 3 TIMES DAILY PRN
Qty: 90 CAPSULE | Refills: 1 | Status: SHIPPED | OUTPATIENT
Start: 2020-01-21 | End: 2020-02-24 | Stop reason: ALTCHOICE

## 2020-01-23 ENCOUNTER — OFFICE VISIT (OUTPATIENT)
Dept: PHYSICAL THERAPY | Facility: CLINIC | Age: 40
End: 2020-01-23
Payer: COMMERCIAL

## 2020-01-23 DIAGNOSIS — M54.42 BILATERAL LOW BACK PAIN WITH BILATERAL SCIATICA, UNSPECIFIED CHRONICITY: Primary | ICD-10-CM

## 2020-01-23 DIAGNOSIS — M54.41 BILATERAL LOW BACK PAIN WITH BILATERAL SCIATICA, UNSPECIFIED CHRONICITY: Primary | ICD-10-CM

## 2020-01-23 PROCEDURE — 97140 MANUAL THERAPY 1/> REGIONS: CPT

## 2020-01-23 PROCEDURE — 97110 THERAPEUTIC EXERCISES: CPT

## 2020-01-23 PROCEDURE — 97112 NEUROMUSCULAR REEDUCATION: CPT

## 2020-01-23 NOTE — PROGRESS NOTES
Daily Note     Today's date: 2020  Patient name: Wilfredo Kussmaul  : 1980  MRN: [id number]  Referring provider: Haresh Gonsales  Dx:   Encounter Diagnosis   Name Primary?  Bilateral low back pain with bilateral sciatica, unspecified chronicity Yes       Start Time:   Stop Time:   Total time in clinic (min): 45 minutes    Subjective: Pt reports he did not have a chance to perform HEP  Denies increased c/o following IE  Pain Ratin/10    Objective: See treatment diary below  Precautions: Standard    Specialty Daily Treatment Diary       Manual 20       STM        L/S p-a mobs Gr 3-4       Man Flexibility HS/quad/ piriformis       MET                        Exercise Diary         Sciatic nerve glides        TA activation 20x5" w/ball squeeze       Bridges 15x5"/15x5" PB       SLR abd         [place]ann L1 x15       Nathan hip ext/abd 2x10 ea 2 0       Squats PB 15x3"/ high low 2x10       Tubing ext 20x5" blue                                                                               Modalities                            Assessment: Pt is able to perform squats to high-low table using good hip hinge without back pain or LE radicular symptoms  Demo good TA activation in supine and standing  Plan: Continue per plan of care  Progress treatment as tolerated

## 2020-01-27 ENCOUNTER — OFFICE VISIT (OUTPATIENT)
Dept: PHYSICAL THERAPY | Facility: CLINIC | Age: 40
End: 2020-01-27
Payer: COMMERCIAL

## 2020-01-27 DIAGNOSIS — M54.42 BILATERAL LOW BACK PAIN WITH BILATERAL SCIATICA, UNSPECIFIED CHRONICITY: Primary | ICD-10-CM

## 2020-01-27 DIAGNOSIS — M54.41 BILATERAL LOW BACK PAIN WITH BILATERAL SCIATICA, UNSPECIFIED CHRONICITY: Primary | ICD-10-CM

## 2020-01-27 PROCEDURE — 97112 NEUROMUSCULAR REEDUCATION: CPT

## 2020-01-27 PROCEDURE — 97110 THERAPEUTIC EXERCISES: CPT

## 2020-01-27 PROCEDURE — 97140 MANUAL THERAPY 1/> REGIONS: CPT

## 2020-01-27 NOTE — PROGRESS NOTES
Daily Note     Today's date: 2020  Patient name: Stanford Davey  : 1980  MRN: [id number]  Referring provider: Reid Andrews  Dx:   Encounter Diagnosis   Name Primary?  Bilateral low back pain with bilateral sciatica, unspecified chronicity Yes       Start Time:   Stop Time:   Total time in clinic (min): 50 minutes    Subjective: Pt reports he felt good after his previous session  States he was able to perform HEP without c/o  Pain Ratin/10    Objective: See treatment diary below  Precautions: Standard    Specialty Daily Treatment Diary       Manual 20      STM        L/S p-a mobs Gr 3-4 Gr 3-4      Man Flexibility HS/quad/ piriformis HS/quad/ piriformis      MET                        Exercise Diary         Sciatic nerve glides        TA activation 20x5" w/ball squeeze 10x5" 2/ball squeeze / x10 feet elevated      Bridges 15x5"/15x5" PB x15 alt SLR / 15x5" PB      SLR abd         [place]ann L1 x15 L1 x10/ L2 x10      [place]athan hip ext/abd 2x10 ea 2 0 2x10 ea 2 0      Squats PB 15x3"/ high low 2x10 PB 15x3"/ high low x15 band abd green      Tubing ext 20x5" blue 20x5" blue      Prone hip ext  x10 angie      Paloff press  2x10 angie blue                                                              Modalities                            Assessment: Pt reports of slight paresthesia with Sahrmann level 2, able to abolish with cuing for TA activation  Reports of decreased difficulty with hip hinge/squatting  Plan: Continue per plan of care  Progress treatment as tolerated

## 2020-01-29 ENCOUNTER — OFFICE VISIT (OUTPATIENT)
Dept: PHYSICAL THERAPY | Facility: CLINIC | Age: 40
End: 2020-01-29
Payer: COMMERCIAL

## 2020-01-29 DIAGNOSIS — M54.42 BILATERAL LOW BACK PAIN WITH BILATERAL SCIATICA, UNSPECIFIED CHRONICITY: Primary | ICD-10-CM

## 2020-01-29 DIAGNOSIS — M54.41 BILATERAL LOW BACK PAIN WITH BILATERAL SCIATICA, UNSPECIFIED CHRONICITY: Primary | ICD-10-CM

## 2020-01-29 PROCEDURE — 97112 NEUROMUSCULAR REEDUCATION: CPT

## 2020-01-29 PROCEDURE — 97140 MANUAL THERAPY 1/> REGIONS: CPT

## 2020-01-29 NOTE — PROGRESS NOTES
Daily Note     Today's date: 2020  Patient name: Ozzie Vargas  : 1980  MRN: [id number]  Referring provider: Gabe De La Vega  Dx:   Encounter Diagnosis   Name Primary?  Bilateral low back pain with bilateral sciatica, unspecified chronicity Yes       Start Time:   Stop Time:   Total time in clinic (min): 50 minutes    Subjective: Pt reports he has noticed a decrease in pain and is cognizant of squatting when lifting at work  Pain Ratin/10    Objective: See treatment diary below  Precautions: Standard    Specialty Daily Treatment Diary       Manual 20     STM        L/S p-a mobs Gr 3-4 Gr 3-4 Gr 3-4     Man Flexibility HS/quad/ piriformis HS/quad/ piriformis HS/quad/ piriformis     MET                        Exercise Diary         Sciatic nerve glides        TA activation 20x5" w/ball squeeze 10x5" ball squeeze / x10 feet elevated 10x5" ball squeeze / x10 feet elevated     Bridges 15x5"/15x5" PB x15 alt SLR / 15x5" PB x15 alt SLR / 15x5" PB     SLR abd         Sahrmann L1 x15 L1 x10/ L2 x10 L2 2x10     [place] hip ext/abd 2x10 ea 2 0 2x10 ea 2 0      Squats PB 15x3"/ high low 2x10 PB 15x3"/ high low x15 band abd green PB 15x3"/ high low x15 band abd green     Tubing ext 20x5" blue 20x5" blue 20x5" + horiz abd blue     Prone hip ext  x10 angie x10 angie     Paloff press  2x10 angie blue 2x10 angie blue     Lat band walks   6x20ft blue     Nathan SL RDL   x10 6 0                                             Modalities                            Assessment: Pt continues to be challenged by therex but denies pain or radicular symptoms  Discussed practicing hip hinge at home and working on hover chair squats to increased endurance  Pt reports of low back fatigue at EOS  Plan: Continue per plan of care  Progress treatment as tolerated

## 2020-02-03 ENCOUNTER — OFFICE VISIT (OUTPATIENT)
Dept: PHYSICAL THERAPY | Facility: CLINIC | Age: 40
End: 2020-02-03
Payer: COMMERCIAL

## 2020-02-03 DIAGNOSIS — M54.41 BILATERAL LOW BACK PAIN WITH BILATERAL SCIATICA, UNSPECIFIED CHRONICITY: Primary | ICD-10-CM

## 2020-02-03 DIAGNOSIS — M54.42 BILATERAL LOW BACK PAIN WITH BILATERAL SCIATICA, UNSPECIFIED CHRONICITY: Primary | ICD-10-CM

## 2020-02-03 PROCEDURE — 97140 MANUAL THERAPY 1/> REGIONS: CPT

## 2020-02-03 PROCEDURE — 97110 THERAPEUTIC EXERCISES: CPT

## 2020-02-03 PROCEDURE — 97112 NEUROMUSCULAR REEDUCATION: CPT

## 2020-02-03 NOTE — PROGRESS NOTES
Daily Note     Today's date: 2/3/2020  Patient name: Caterina Casiano  : 1980  MRN: [id number]  Referring provider: Constance King  Dx:   Encounter Diagnosis   Name Primary?  Bilateral low back pain with bilateral sciatica, unspecified chronicity Yes       Start Time:   Stop Time:   Total time in clinic (min): 50 minutes    Subjective: Pt reports his back continues to feel better  Denies c/o with HEP performance  Pain Ratin/10    Objective: See treatment diary below  Precautions: Standard    Specialty Daily Treatment Diary       Manual 1/23/20 1/27/20 1/29/20 2/3/20    STM        L/S p-a mobs Gr 3-4 Gr 3-4 Gr 3-4 Gr 3-4    Man Flexibility HS/quad/ piriformis HS/quad/ piriformis HS/quad/ piriformis HS/quad/ piriformis    MET                        Exercise Diary         Sciatic nerve glides        TA activation 20x5" w/ball squeeze 10x5" ball squeeze / x10 feet elevated 10x5" ball squeeze / x10 feet elevated 10x5" ball squeeze / x10 feet elevated    Bridges 15x5"/15x5" PB x15 alt SLR / 15x5" PB x15 alt SLR / 15x5" PB x10 alt SLR / x10 SL    SLR abd         Sahrmann L1 x15 L1 x10/ L2 x10 L2 2x10 L2 2x10    [place] hip ext/abd 2x10 ea 2 0 2x10 ea 2 0  2x10 ea 2 5    Squats PB 15x3"/ high low 2x10 PB 15x3"/ high low x15 band abd green PB 15x3"/ high low x15 band abd green PB 2x15 high low blue    Tubing ext 20x5" blue 20x5" blue 20x5" + horiz abd blue 20x5" + horiz abd/flex yellow    Prone hip ext  x10 angie x10 angie + abd x10 angie    Paloff press  2x10 angie blue 2x10 angie blue 2x10 angie blue    Lat band walks   6x20ft blue 2x20ft blue + monster walks 2x20ft    [place] SL RDL   x10 6 0     Lift training                                        Modalities                              Assessment: Pt reports of L anterior tibia symptoms with R LE single leg bridge, advised to hold  Good squat form without cuing  Plan: Continue per plan of care  Progress treatment as tolerated

## 2020-02-05 ENCOUNTER — OFFICE VISIT (OUTPATIENT)
Dept: PHYSICAL THERAPY | Facility: CLINIC | Age: 40
End: 2020-02-05
Payer: COMMERCIAL

## 2020-02-05 DIAGNOSIS — M54.41 BILATERAL LOW BACK PAIN WITH BILATERAL SCIATICA, UNSPECIFIED CHRONICITY: Primary | ICD-10-CM

## 2020-02-05 DIAGNOSIS — M54.42 BILATERAL LOW BACK PAIN WITH BILATERAL SCIATICA, UNSPECIFIED CHRONICITY: Primary | ICD-10-CM

## 2020-02-05 PROCEDURE — 97110 THERAPEUTIC EXERCISES: CPT

## 2020-02-05 PROCEDURE — 97140 MANUAL THERAPY 1/> REGIONS: CPT

## 2020-02-05 PROCEDURE — 97112 NEUROMUSCULAR REEDUCATION: CPT

## 2020-02-05 NOTE — PROGRESS NOTES
Daily Note     Today's date: 2020  Patient name: Jose Bernabe  : 1980  MRN: [id number]  Referring provider: Ping Barragan  Dx:   Encounter Diagnosis   Name Primary?  Bilateral low back pain with bilateral sciatica, unspecified chronicity Yes       Start Time:   Stop Time:   Total time in clinic (min): 45 minutes    Subjective: Pt reports he began experiencing R low back pain today while standing at work    Pain Ratin/10    Objective: See treatment diary below  Precautions: Standard    Specialty Daily Treatment Diary       Manual 1/23/20 1/27/20 1/29/20 2/3/20 2/5/20   STM        L/S p-a mobs Gr 3-4 Gr 3-4 Gr 3-4 Gr 3-4 Gr 3-4   Man Flexibility HS/quad/ piriformis HS/quad/ piriformis HS/quad/ piriformis HS/quad/ piriformis HS/quad/ piriformis   MET                        Exercise Diary         Sciatic nerve glides        TA activation 20x5" w/ball squeeze 10x5" ball squeeze / x10 feet elevated 10x5" ball squeeze / x10 feet elevated 10x5" ball squeeze / x10 feet elevated 15x5"   Bridges 15x5"/15x5" PB x15 alt SLR / 15x5" PB x15 alt SLR / 15x5" PB x10 alt SLR / x10 SL 20x5"    SLR abd         Sahrmann L1 x15 L1 x10/ L2 x10 L2 2x10 L2 2x10 L2 2x10   [place] hip ext/abd 2x10 ea 2 0 2x10 ea 2 0  2x10 ea 2 5    Squats PB 15x3"/ high low 2x10 PB 15x3"/ high low x15 band abd green PB 15x3"/ high low x15 band abd green PB 2x15 high low blue PB x15/ x15 blue   Tubing ext 20x5" blue 20x5" blue 20x5" + horiz abd blue 20x5" + horiz abd/flex yellow 20x5" + horiz abd/flex yellow   Prone hip ext  x10 angie x10 angie + abd x10 angie x10/ aquaman x10 angie   Paloff press  2x10 angie blue 2x10 angie blue 2x10 angie blue    Lat band walks   6x20ft blue 2x20ft blue + monster walks 2x20ft 4x20ft blue    Parsons SL RDL   x10 6 0     Lift training                                        Modalities                              Assessment: Pt demonstrates TTP R lumbar paraspinals/QL, which is exacerbated by R hip abduction activity  Discussed resting and using heat/ice to decrease symptoms  Advised to avoid any painful activity, and to continue focusing on squat form as this did not exacerbate c/o  Plan: Continue per plan of care  Progress treatment as tolerated

## 2020-02-10 ENCOUNTER — OFFICE VISIT (OUTPATIENT)
Dept: PHYSICAL THERAPY | Facility: CLINIC | Age: 40
End: 2020-02-10
Payer: COMMERCIAL

## 2020-02-10 DIAGNOSIS — M54.41 BILATERAL LOW BACK PAIN WITH BILATERAL SCIATICA, UNSPECIFIED CHRONICITY: Primary | ICD-10-CM

## 2020-02-10 DIAGNOSIS — M54.42 BILATERAL LOW BACK PAIN WITH BILATERAL SCIATICA, UNSPECIFIED CHRONICITY: Primary | ICD-10-CM

## 2020-02-10 PROCEDURE — 97140 MANUAL THERAPY 1/> REGIONS: CPT

## 2020-02-10 PROCEDURE — 97112 NEUROMUSCULAR REEDUCATION: CPT

## 2020-02-10 NOTE — PROGRESS NOTES
Daily Note     Today's date: 2/10/2020  Patient name: Zakia Magallon  : 1980  MRN: [id number]  Referring provider: Emre Cali  Dx:   Encounter Diagnosis   Name Primary?  Bilateral low back pain with bilateral sciatica, unspecified chronicity Yes       Start Time:   Stop Time:   Total time in clinic (min): 45 minutes    Subjective: Pt reports his back remains sore over the weekend with lifting activity  Pain Ratin/10    Objective: See treatment diary below  Precautions: Standard    Specialty Daily Treatment Diary       Manual 2/10/20  1/29/20 2/3/20 2/5/20   STM        L/S p-a mobs Gr 3-4  Gr 3-4 Gr 3-4 Gr 3-4   Man Flexibility HS/quad/ piriformis  HS/quad/ piriformis HS/quad/ piriformis HS/quad/ piriformis   MET                        Exercise Diary         Sciatic nerve glides        TA activation   10x5" ball squeeze / x10 feet elevated 10x5" ball squeeze / x10 feet elevated 15x5"   Bridges x10/x10 alt SLR  x15 alt SLR / 15x5" PB x10 alt SLR / x10 SL 20x5"    SLR abd  Standing 2x10 angie       Sahrmann L2x10  L2 2x10 L2 2x10 L2 2x10   [place]athan hip ext/abd    2x10 ea 2 5    Squats 2x10 high low  PB 15x3"/ high low x15 band abd green PB 2x15 high low blue PB x15/ x15 blue   Tubing ext   20x5" + horiz abd blue 20x5" + horiz abd/flex yellow 20x5" + horiz abd/flex yellow   Prone hip ext Aquaman x10 angie  x10 angie + abd x10 angie x10/ aquaman x10 angie   Paloff press   2x10 angie blue 2x10 angie blue    Lat band walks Clamshells 15x5" angie green  6x20ft blue 2x20ft blue + monster walks 2x20ft 4x20ft blue    [place] SL RDL   x10 6 0     Lift training        PPU x10       Open books 10x5" angie                       Modalities                            Assessment: Pt reports of R QL TTP, and R sided LBP with bridge w/ alternate SLR  Demo increased hip hike with R hip abduction able to correct with cuing for standing abduction    Pt able to forward bend to floor without pain at EOS, discussed continued flexibility activity with HEP  Plan: Continue per plan of care  Progress treatment as tolerated

## 2020-02-12 ENCOUNTER — OFFICE VISIT (OUTPATIENT)
Dept: PHYSICAL THERAPY | Facility: CLINIC | Age: 40
End: 2020-02-12
Payer: COMMERCIAL

## 2020-02-12 DIAGNOSIS — M54.41 BILATERAL LOW BACK PAIN WITH BILATERAL SCIATICA, UNSPECIFIED CHRONICITY: Primary | ICD-10-CM

## 2020-02-12 DIAGNOSIS — M54.42 BILATERAL LOW BACK PAIN WITH BILATERAL SCIATICA, UNSPECIFIED CHRONICITY: Primary | ICD-10-CM

## 2020-02-12 PROCEDURE — 97140 MANUAL THERAPY 1/> REGIONS: CPT

## 2020-02-12 PROCEDURE — 97110 THERAPEUTIC EXERCISES: CPT

## 2020-02-12 PROCEDURE — 97112 NEUROMUSCULAR REEDUCATION: CPT

## 2020-02-12 NOTE — PROGRESS NOTES
Daily Note     Today's date: 2020  Patient name: Houston Wells  : 1980  MRN: [id number]  Referring provider: Neeta Paulino  Dx:   Encounter Diagnosis   Name Primary?  Bilateral low back pain with bilateral sciatica, unspecified chronicity Yes       Start Time:   Stop Time:   Total time in clinic (min): 45 minutes    Subjective: Pt reports he has been doing his HEP/stretching in the AM and his back is feeling much better  Pain Ratin/10    Objective: See treatment diary below  Precautions: Standard    Specialty Daily Treatment Diary       Manual 2/10/20 2/12/20  2/3/20 2/5/20   STM        L/S p-a mobs Gr 3-4 Gr 3-4  Gr 3-4 Gr 3-4   Man Flexibility HS/quad/ piriformis HS/quad/ piriformis  HS/quad/ piriformis HS/quad/ piriformis   MET                        Exercise Diary         Sciatic nerve glides        TA activation    10x5" ball squeeze / x10 feet elevated 15x5"   Bridges x10/x10 alt SLR 20x5"  x10 alt SLR / x10 SL 20x5"    SLR abd  Standing 2x10 angie Standing 2x10 angie      Sahrmann L2x10 L2 x20  L2 2x10 L2 2x10   [place] hip ext/abd    2x10 ea 2 5    Squats 2x10 high low 2x10 high-low  PB 2x15 high low blue PB x15/ x15 blue   Tubing ext  20x5" + horiz abd/flex blue  20x5" + horiz abd/flex yellow 20x5" + horiz abd/flex yellow   Prone hip ext Aquaman x10 angie Aquaman x10 angie  + abd x10 angie x10/ aquaman x10 angie   Paloff press    2x10 nagie blue    Lat band walks Clamshells 15x5" angie green   2x20ft blue + monster walks 2x20ft 4x20ft blue    Nathan SL RDL        Lift training        PPU x10 10x5"      Open books 10x5" angie 10x5" angie      Lat step downs  x10 angie 8in              Modalities                            Assessment: Pt demonstrates R hip trendelenburg with lat step down, improved with PT cuing and decreased step height  Denies increased c/o with today's activity  Progress R LE strengthening as tolerated  Plan: Continue per plan of care   Progress treatment as tolerated

## 2020-02-17 ENCOUNTER — OFFICE VISIT (OUTPATIENT)
Dept: PHYSICAL THERAPY | Facility: CLINIC | Age: 40
End: 2020-02-17
Payer: COMMERCIAL

## 2020-02-17 DIAGNOSIS — M54.41 BILATERAL LOW BACK PAIN WITH BILATERAL SCIATICA, UNSPECIFIED CHRONICITY: Primary | ICD-10-CM

## 2020-02-17 DIAGNOSIS — M54.42 BILATERAL LOW BACK PAIN WITH BILATERAL SCIATICA, UNSPECIFIED CHRONICITY: Primary | ICD-10-CM

## 2020-02-17 PROCEDURE — 97140 MANUAL THERAPY 1/> REGIONS: CPT

## 2020-02-17 PROCEDURE — 97110 THERAPEUTIC EXERCISES: CPT

## 2020-02-17 PROCEDURE — 97112 NEUROMUSCULAR REEDUCATION: CPT

## 2020-02-17 NOTE — PROGRESS NOTES
Daily Note     Today's date: 2020  Patient name: Frankie Bae  : 1980  MRN: [id number]  Referring provider: Sera Gayle  Dx:   Encounter Diagnosis   Name Primary?  Bilateral low back pain with bilateral sciatica, unspecified chronicity Yes       Start Time:   Stop Time:   Total time in clinic (min): 50 minutes    Subjective: Pt reports his back felt pretty good over the weekend  Continued R LE weakness compared to L  Pain Ratin/10    Objective: See treatment diary below  Precautions: Standard    Specialty Daily Treatment Diary       Manual 2/10/20 2/12/20 2/17/20  2/5/20   STM        L/S p-a mobs Gr 3-4 Gr 3-4 Gr 3-4  Gr 3-4   Man Flexibility HS/quad/ piriformis HS/quad/ piriformis HS/quad/ piriformis  HS/quad/ piriformis   MET                        Exercise Diary         Sciatic nerve glides        TA activation     15x5"   Bridges x10/x10 alt SLR 20x5" 20x5"  20x5"    SLR abd  Standing 2x10 angie Standing 2x10 angie      Sahrmann L2x10 L2 x20 L3 x10 angie  L2 2x10   [place] hip ext/abd        Squats 2x10 high low 2x10 high-low 2x15 chair/ x10 angie SL ecc high low  PB x15/ x15 blue   Tubing ext  20x5" + horiz abd/flex blue 20x5" + horiz abd/flex blue  20x5" + horiz abd/flex yellow   Prone hip ext Aquaman x10 angie Aquaman x10 angie Aquaman x10 angie  x10/ aquaman x10 angie   Paloff press        Lat band walks Clamshells 15x5" angie green    4x20ft blue    Nathan SL RDL        Lift training        PPU x10 10x5" 15x5"     Open books 10x5" angie 10x5" angie 10x5" angie     Lat step downs  x10 angie 8in 2x10 angie 6in             Modalities                            Assessment: Pt demonstrates improved hip hinge and less trendelburg pattern with R LE SL actiivty  Re-eval upon next visit  Plan: Continue per plan of care  Progress treatment as tolerated

## 2020-02-19 ENCOUNTER — OFFICE VISIT (OUTPATIENT)
Dept: PHYSICAL THERAPY | Facility: CLINIC | Age: 40
End: 2020-02-19
Payer: COMMERCIAL

## 2020-02-19 ENCOUNTER — APPOINTMENT (OUTPATIENT)
Dept: PHYSICAL THERAPY | Facility: CLINIC | Age: 40
End: 2020-02-19
Payer: COMMERCIAL

## 2020-02-19 DIAGNOSIS — M54.41 BILATERAL LOW BACK PAIN WITH BILATERAL SCIATICA, UNSPECIFIED CHRONICITY: Primary | ICD-10-CM

## 2020-02-19 DIAGNOSIS — M54.42 BILATERAL LOW BACK PAIN WITH BILATERAL SCIATICA, UNSPECIFIED CHRONICITY: Primary | ICD-10-CM

## 2020-02-19 DIAGNOSIS — F90.9 ATTENTION DEFICIT HYPERACTIVITY DISORDER (ADHD), UNSPECIFIED ADHD TYPE: ICD-10-CM

## 2020-02-19 PROCEDURE — 97140 MANUAL THERAPY 1/> REGIONS: CPT

## 2020-02-19 PROCEDURE — 97110 THERAPEUTIC EXERCISES: CPT

## 2020-02-19 RX ORDER — LISDEXAMFETAMINE DIMESYLATE 50 MG
50 CAPSULE ORAL DAILY
Qty: 30 CAPSULE | Refills: 0 | Status: SHIPPED | OUTPATIENT
Start: 2020-02-19 | End: 2020-03-18 | Stop reason: SDUPTHER

## 2020-02-20 NOTE — PROGRESS NOTES
PT Discharge    Today's date: 2020  Patient name: Zakia Magallon  : 1980  MRN: [id number]  Referring provider: Emre Cali  Dx:   Encounter Diagnosis     ICD-10-CM    1  Bilateral low back pain with bilateral sciatica, unspecified chronicity M54 42     M54 41        Start Time:   Stop Time:   Total time in clinic (min): 45 minutes    Assessment  Assessment details: To date, Zakia Magallon has received 10 Physical Therapy visits consisting of manual therapy techniques neuromuscular re-education and therapeutic exercises  Ronnie Toscano has been able to return to prior level of function including prolonged sitting and working/lifting without increased c/o  Pt continues to demonstrate R LE weakness compared to the L, but would like to progress indep with strengthening at this time  HEP was reviewed which patient was able to demonstrate independently  Ronnie Toscano is to be discharged from skilled Physical Therapy services at this time secondary to achievement of functional goals and independence with home exercise program        Impairments: impaired physical strength  Understanding of Dx/Px/POC: good   Prognosis: good    Goals  Short Term Goals: Target Date 2020  1  Initiate and advance HEP - achieved  2  Increase bilateral LE MMT by at least 1/2 grade  - achieved  3  Pt will be able to ambulate community distances without pain or gross deviation - acheved  4  Pt will be able to squat to approx 80 deg knee flex using proper form with minimal PT cuing - achieved  5  Pt will be able to lift a 20 lb object from the floor to waist height using proper form with PT cuing - achieved      Long Term Goals: Target Date 3/20/2020  1  Indep with HEP - achieved  2  Increase bilateral LE MMT to at least 4+/5 in all planes - mostly achieved  3  Pt will be able to demonstrate a full squat using proper form without PT cuing - achieved  4   Pt will be able to lift a 40 lb object from the floor to waist height using proper form without PT cuing - achieved  5  Pt will be able to return to PLOF including all work related activity as a  without limitation - achieved      Plan  Plan details: Pt is discharged from skilled PT services  Planned therapy interventions: home exercise program  Treatment plan discussed with: patient        Subjective Evaluation    History of Present Illness  Mechanism of injury: Pt reports in 2019 he was splitting and carrying large amounts of wood and reports as he progressed, his back began to tighten  States this had been previously happening for about 3 years with [employer] of other high level activity  Pt reports the pain has been increasing since that time, and states he also began experiencing paresthesia more in the front of the L>R LE  He had a previous MRI (disc compression) many years ago, but no recent imaging  States his pain is increased with forward bending, prolonged walking, sit to stand transfers, lifting, carrying  and prolonged sitting/driving  Pt was seen by Dr Garo Mccray who prescribed OPPT  20: To date, Stanford Davey has received 10 Physical Therapy visits consisting of manual therapy techniques neuromuscular re-education and therapeutic exercises  Alphia Course has been able to return to prior level of function including prolonged sitting and working/lifting without increased c/o  Pt continues to demonstrate R LE weakness compared to the L, but would like to progress indep with strengthening at this time  HEP was reviewed which patient was able to demonstrate independently    Alphia Course is to be discharged from skilled Physical Therapy services at this time secondary to achievement of functional goals and independence with home exercise program    Quality of life: good    Pain  Current pain ratin  At best pain ratin  At worst pain ratin  Location: R Low back  Quality: sharp  Relieving factors: rest (Lying)  Aggravating factors: lifting, walking and sitting (Bending,carrying)  Progression: worsening    Social Support  Lives with: young children and spouse    Employment status: working (Works at a car [employer])  Hand dominance: left    Treatments  No previous or current treatments  Patient Goals  Patient goals for therapy: decreased pain and increased strength (Achieved)  Patient goal: Improve core strength (Achieved)        Objective     Concurrent Complaints  Negative for bladder dysfunction and bowel dysfunction    Static Posture     Lumbar Spine   Flattened  Neurological Testing     Sensation     Lumbar   Left   Intact: light touch    Right   Intact: light touch    Active Range of Motion     Lumbar   Flexion: Active lumbar flexion: Fingertips to floor  WFL  Extension:  [facility]  Left lateral flexion:  WFL  Right lateral flexion:  WFL  Left rotation:  WFL  Right rotation:  WFL    Passive Range of Motion   Left Hip   Flexion: WFL  Abduction: WFL  External rotation (90/90): [facility]  Internal rotation (90/90): WFL    Right Hip   Flexion: WFL  Abduction: [place]  External rotation (90/90): [place]  Internal rotation (90/90): [place]    Joint Play   Joints within functional limits: L1, L2, L3, L4 and L5     Strength/Myotome Testing     Left Hip   Planes of Motion   Flexion: 5  Extension: 4+  Abduction: 4+  Adduction: 5    Right Hip   Planes of Motion   Flexion: 5  Extension: 5  Abduction: 5  Adduction: 5    Left Knee   Normal strength    Right Knee   Normal strength    Left Ankle/Foot   Normal strength    Right Ankle/Foot   Dorsiflexion: 5  Plantar flexion: 5    Ambulation     Observational Gait     Additional Observational Gait Details  Pt ambulates with a mostly normalized pattern, denies c/o    Functional Assessment        Forward Step Up 8"   Left Leg  Within functional limits  Right Leg  Within functional limits  Forward Step Down 8"   Left Leg  Within functional limits  Right Leg  Within functional limits       Single Leg Stance   Left: 20 seconds  Right: 20 seconds    Comments  Squat:   Pt is able to squat to demonstrate a full squat with proper body mechanics  Denies c/o        Flowsheet Rows      Most Recent Value   PT/OT G-Codes   Current Score  83   Projected Score  78   FOTO information reviewed  Yes   Assessment Type  Discharge             Precautions: Standard    Specialty Daily Treatment Diary       Manual 2/10/20 2/12/20 2/17/20 2/20/20    STM        L/S p-a mobs Gr 3-4 Gr 3-4 Gr 3-4 Gr 3-4    Man Flexibility HS/quad/ piriformis HS/quad/ piriformis HS/quad/ piriformis HS/quad/ piriformis    MET                        Exercise Diary         Sciatic nerve glides        TA activation        Bridges x10/x10 alt SLR 20x5" 20x5" 20x5"    SLR abd  Standing 2x10 angie Standing 2x10 angie  Standing 2x10 angie red    Sahrmann L2x10 L2 x20 L3 x10 angie L2/L3 x10 angie ea    [place] hip ext/abd        Squats 2x10 high low 2x10 high-low 2x15 chair/ x10 angie SL ecc high low 3x10 high low    Tubing ext  20x5" + horiz abd/flex blue 20x5" + horiz abd/flex blue     Prone hip ext Aquaman x10 angie Aquaman x10 angie Aquaman x10 angie Aquaman x10 angie    Paloff press        Lat band walks Clamshells 15x5" angie green       [place] SL RDL        Lift training        PPU x10 10x5" 15x5" 15x5"    Open books 10x5" angie 10x5" angie 10x5" angie     Lat step downs  x10 angie 8in 2x10 angie 6in 2x10 angie 6in            Modalities

## 2020-02-24 ENCOUNTER — APPOINTMENT (OUTPATIENT)
Dept: PHYSICAL THERAPY | Facility: CLINIC | Age: 40
End: 2020-02-24
Payer: COMMERCIAL

## 2020-02-24 ENCOUNTER — OFFICE VISIT (OUTPATIENT)
Dept: FAMILY MEDICINE CLINIC | Facility: CLINIC | Age: 40
End: 2020-02-24
Payer: COMMERCIAL

## 2020-02-24 VITALS
SYSTOLIC BLOOD PRESSURE: 126 MMHG | OXYGEN SATURATION: 97 % | TEMPERATURE: 97.7 F | HEART RATE: 65 BPM | WEIGHT: 315 LBS | BODY MASS INDEX: 42.66 KG/M2 | DIASTOLIC BLOOD PRESSURE: 74 MMHG | RESPIRATION RATE: 16 BRPM | HEIGHT: 72 IN

## 2020-02-24 DIAGNOSIS — F41.9 ANXIETY AND DEPRESSION: ICD-10-CM

## 2020-02-24 DIAGNOSIS — J45.20 MILD INTERMITTENT ASTHMA WITHOUT COMPLICATION: ICD-10-CM

## 2020-02-24 DIAGNOSIS — R53.83 OTHER FATIGUE: ICD-10-CM

## 2020-02-24 DIAGNOSIS — F90.0 ATTENTION DEFICIT HYPERACTIVITY DISORDER (ADHD), PREDOMINANTLY INATTENTIVE TYPE: ICD-10-CM

## 2020-02-24 DIAGNOSIS — E66.01 MORBID OBESITY WITH BMI OF 40.0-44.9, ADULT (HCC): ICD-10-CM

## 2020-02-24 DIAGNOSIS — G47.33 OSA (OBSTRUCTIVE SLEEP APNEA): ICD-10-CM

## 2020-02-24 DIAGNOSIS — R73.01 IMPAIRED FASTING GLUCOSE: ICD-10-CM

## 2020-02-24 DIAGNOSIS — Z13.1 SCREENING FOR DIABETES MELLITUS: ICD-10-CM

## 2020-02-24 DIAGNOSIS — F32.A ANXIETY AND DEPRESSION: ICD-10-CM

## 2020-02-24 DIAGNOSIS — Z00.00 ANNUAL PHYSICAL EXAM: Primary | ICD-10-CM

## 2020-02-24 DIAGNOSIS — I10 ESSENTIAL HYPERTENSION: ICD-10-CM

## 2020-02-24 DIAGNOSIS — Z13.6 SCREENING FOR CARDIOVASCULAR CONDITION: ICD-10-CM

## 2020-02-24 PROCEDURE — 99385 PREV VISIT NEW AGE 18-39: CPT | Performed by: FAMILY MEDICINE

## 2020-02-24 NOTE — PATIENT INSTRUCTIONS

## 2020-02-24 NOTE — PROGRESS NOTES
[address] [facility]    NAME: Jose Bernabe  AGE: 42 y o  SEX: male  : 1980     DATE: 2020     Assessment and Plan:     Problem List Items Addressed This Visit        Endocrine    Impaired fasting glucose    Relevant Orders    Comprehensive metabolic panel    HEMOGLOBIN A1C W/ EAG ESTIMATION       Respiratory    Asthma    JAMIE (obstructive sleep apnea)       Cardiovascular and Mediastinum    Hypertension       Other    ADHD (attention deficit hyperactivity disorder)    Morbid obesity with BMI of 40 0-44 9, adult [facility])      Other Visit Diagnoses     Annual physical exam    -  Primary    Lisette Sales appears well  We discussed that he is to focus on a lower carb/salt diet, regular exercise, and weight loss  FBW ordered  Anxiety and depression        OCD - stable on daily Escitalopram     Screening for diabetes mellitus        Relevant Orders    Comprehensive metabolic panel    HEMOGLOBIN A1C W/ EAG ESTIMATION    Screening for cardiovascular condition        Relevant Orders    Lipid Panel with Direct LDL reflex    Other fatigue        Relevant Orders    CBC and differential    TSH, 3rd generation with Free T4 reflex          Immunizations and preventive care screenings were discussed with patient today  Appropriate education was printed on patient's after visit summary  Counseling:  Dental Health: discussed importance of regular tooth brushing, flossing, and dental visits  · Exercise: the importance of regular exercise/physical activity was discussed  Recommend exercise 3-5 times per week for at least 30 minutes  Return in 3 months (on 2020) for Recheck       Chief Complaint:     Chief Complaint   Patient presents with   Christopher Arias Establish Care     Patient here to establish care as new patient    Annual Exam     patient here for annual wellness exam     Medication Refill     Patient here for medication refills    Labs Only Patient here for labs     Medication Problem     Patient here to discuss metformin medication       History of Present Illness:     Adult Annual Physical   Patient here for a comprehensive physical exam  The patient reports no problems  New pt to the clinic today  Pt is followed by Pulmonology / [facility] for his hx of JAMIE (on CPAP)  He has been receiving his Vyvanse thru Pulm/[facility] Med for his ADD  [place] PDMP was checked, and refills appropriate - last was filled on 2/19/20  Immunizations are UTD  Diet and Physical Activity  · Diet/Nutrition: low carb diet  · Exercise: 3-4 times a week on average  Depression Screening  PHQ-9 Depression Screening    PHQ-9:    Frequency of the following problems over the past two weeks:            General Health  · Sleep: sleeps well  On CPAP  · Hearing: normal - bilateral   · Vision: no vision problems  · Dental: regular dental visits   Health  · History of STDs?: no      Review of Systems:     Review of Systems   Constitutional: Negative for activity change  Respiratory: Negative for shortness of breath  Cardiovascular: Negative for chest pain  Gastrointestinal: Negative for abdominal pain and blood in stool  Genitourinary: Negative for decreased urine volume and difficulty urinating  Hx of prostatitis in his 25s  Psychiatric/Behavioral: Negative for decreased concentration, dysphoric mood and suicidal ideas  The patient is not nervous/anxious  Concentration improved with Vyvanse  Mood is controlled; OCD well controlled  No HI/SI          Past Medical History:     Past Medical History:   Diagnosis Date    ADHD (attention deficit hyperactivity disorder)     Hypersomnolence     Hypertension     Hypoxia     Infrapatellar bursitis of right knee     Mycobacterial infectious disease     JAMIE (obstructive sleep apnea)       Past Surgical History:     Past Surgical History:   Procedure Laterality Date    CARPAL TUNNEL RELEASE Bilateral     TONSILLECTOMY        Social History:        Social History     Socioeconomic History    Marital status: /Civil Union     Spouse name: None    Number of children: None    Years of education: None    Highest education level: None   Occupational History    None   Social Needs    Financial resource strain: None    Food insecurity:     Worry: None     Inability: None    Transportation needs:     Medical: None     Non-medical: None   Tobacco Use    Smoking status: Never Smoker    Smokeless tobacco: Never Used   Substance and Sexual Activity    Alcohol use: Yes     Frequency: Monthly or less     Drinks per session: 1 or 2     Binge frequency: Never     Comment: rare    Drug use: No    Sexual activity: None   Lifestyle    Physical activity:     Days per week: None     Minutes per session: None    Stress: None   Relationships    Social connections:     Talks on phone: None     Gets together: None     Attends Zoroastrianism service: None     Active member of club or organization: None     Attends meetings of clubs or organizations: None     Relationship status: None    Intimate partner violence:     Fear of current or ex partner: None     Emotionally abused: None     Physically abused: None     Forced sexual activity: None   Other Topics Concern    None   Social History Narrative    None      Family History:     Family History   Problem Relation Age of Onset    Hypertension Mother     Diabetes Mother     Heart attack Father     Diabetes Father     Hypertension Father     Obesity Father         hx LRYGB    Heart disease Father         hx MI age 36    Arthritis Family     Cancer Family     Cancer Paternal Grandmother         hx bladder cancer    Stroke Neg Hx     Thyroid disease Neg Hx       Current Medications:     Current Outpatient Medications   Medication Sig Dispense Refill    escitalopram (LEXAPRO) 10 mg tablet Take 10 mg by mouth daily        fexofenadine (ALLEGRA) 180 MG tablet Take 180 mg by mouth as needed        losartan (COZAAR) 100 MG tablet Take 100 mg by mouth daily   metoprolol succinate (TOPROL-XL) 50 mg 24 hr tablet Take 50 mg by mouth daily  1    VYVANSE 50 MG capsule Take 1 capsule (50 mg total) by mouth dailyMax Daily Amount: 50 mg 30 capsule 0    metFORMIN (GLUCOPHAGE-XR) 750 mg 24 hr tablet Take 1 tablet daily in AM for 2 weeks and then increase to 2 tablets daily in AM (Patient not taking: Reported on 1/15/2020) 60 tablet 1     No current facility-administered medications for this visit  Allergies: Allergies   Allergen Reactions    Olmesartan-Amlodipine-Hctz Edema    Terazosin Edema      Physical Exam:     /74   Pulse 65   Temp 97 7 °F (36 5 °C)   Resp 16   Ht 6' 0 01" (1 829 m)   Wt (!) 145 kg (319 lb 9 6 oz)   SpO2 97%   BMI 43 34 kg/m²     Physical Exam   Constitutional: He is oriented to person, place, and time  He appears well-developed and well-nourished  No distress  HENT:   Head: Normocephalic and atraumatic  Right Ear: Hearing, tympanic membrane, external ear and ear canal normal    Left Ear: Hearing, tympanic membrane, external ear and ear canal normal    Mouth/Throat: Uvula is midline, oropharynx is clear and moist and mucous membranes are normal  No oropharyngeal exudate, posterior oropharyngeal edema or posterior oropharyngeal erythema  Eyes: Conjunctivae are normal    Neck: Normal range of motion  Neck supple  No thyromegaly present  Cardiovascular: Normal rate, regular rhythm and normal heart sounds  Exam reveals no gallop and no friction rub  No murmur heard  Pulmonary/Chest: Effort normal and breath sounds normal  No stridor  No respiratory distress  He has no wheezes  He has no rales  Abdominal: Soft  Bowel sounds are normal  He exhibits no distension and no mass  There is no tenderness  There is no rebound and no guarding  Lymphadenopathy:     He has no cervical adenopathy     Neurological: He is alert and oriented to person, place, and time  Skin: He is not diaphoretic  Psychiatric: He has a normal mood and affect  His behavior is normal  Judgment and thought content normal    Nursing note and vitals reviewed        [address] Luc[address] Ayala

## 2020-02-26 ENCOUNTER — APPOINTMENT (OUTPATIENT)
Dept: PHYSICAL THERAPY | Facility: CLINIC | Age: 40
End: 2020-02-26
Payer: COMMERCIAL

## 2020-02-28 DIAGNOSIS — F41.9 ANXIETY AND DEPRESSION: ICD-10-CM

## 2020-02-28 DIAGNOSIS — I10 ESSENTIAL HYPERTENSION: Primary | ICD-10-CM

## 2020-02-28 DIAGNOSIS — F32.A ANXIETY AND DEPRESSION: ICD-10-CM

## 2020-02-28 NOTE — TELEPHONE ENCOUNTER
Medication:metoprolol succinate (TOPROL-XL)      Dosage:50 mg       How Often:Take 50 mg by mouth daily  Quantity:  90  Last Office Visit: 02/24/2020  Next Office Visit:06/15/2020  Last refilled: How many pills left:3  Pharmacy:   [address] , [address]  [address] [place] [address]  Phone: [phone] Fax: [phone]        [address] [place] [address]      Dosage:100 MG tablet       How Often:Take 100 mg by mouth daily  Quantity:  90  Last Office Visit:  02/24/2020  Next Office Visit:06/15/2020  Last refilled: How many pills left:3  Pharmacy:   [address] , [address]  [address]  Phone: [phone] Fax: [phone]        Medication:escitalopram [facility]      Dosage:10 mg tablet       How Often:Take 10 mg by mouth daily  Quantity:  90  Last Office Visit: 02/24/2020  Next Office Visit:06/15/2020  Last refilled: How many pills left:3  Pharmacy:   [address] , [place] [address]  [address] [facility] [address]  Phone: [phone] Fax: [phone]          Patient is new to Dr Kaden Thompson  Wife stated this will be the first time for him to fill these

## 2020-03-02 RX ORDER — ESCITALOPRAM OXALATE 10 MG/1
10 TABLET ORAL DAILY
Qty: 30 TABLET | Refills: 0 | Status: SHIPPED | OUTPATIENT
Start: 2020-03-02 | End: 2020-03-18 | Stop reason: SDUPTHER

## 2020-03-02 RX ORDER — METOPROLOL SUCCINATE 50 MG/1
50 TABLET, EXTENDED RELEASE ORAL DAILY
Qty: 30 TABLET | Refills: 5 | Status: SHIPPED | OUTPATIENT
Start: 2020-03-02 | End: 2020-07-09 | Stop reason: SDUPTHER

## 2020-03-02 RX ORDER — LOSARTAN POTASSIUM 100 MG/1
100 TABLET ORAL DAILY
Qty: 30 TABLET | Refills: 5 | Status: SHIPPED | OUTPATIENT
Start: 2020-03-02 | End: 2020-07-09 | Stop reason: SDUPTHER

## 2020-03-07 DIAGNOSIS — F41.9 ANXIETY AND DEPRESSION: ICD-10-CM

## 2020-03-07 DIAGNOSIS — F32.A ANXIETY AND DEPRESSION: ICD-10-CM

## 2020-03-09 RX ORDER — ESCITALOPRAM OXALATE 10 MG/1
TABLET ORAL
Qty: 30 TABLET | Refills: 0 | OUTPATIENT
Start: 2020-03-09

## 2020-03-16 DIAGNOSIS — K64.8 OTHER HEMORRHOIDS: Primary | ICD-10-CM

## 2020-03-16 RX ORDER — HYDROCORTISONE ACETATE 25 MG/1
25 SUPPOSITORY RECTAL 2 TIMES DAILY PRN
Qty: 12 SUPPOSITORY | Refills: 1 | Status: SHIPPED | OUTPATIENT
Start: 2020-03-16 | End: 2022-04-20

## 2020-03-18 DIAGNOSIS — F32.A ANXIETY AND DEPRESSION: ICD-10-CM

## 2020-03-18 DIAGNOSIS — F41.9 ANXIETY AND DEPRESSION: ICD-10-CM

## 2020-03-18 DIAGNOSIS — F90.9 ATTENTION DEFICIT HYPERACTIVITY DISORDER (ADHD), UNSPECIFIED ADHD TYPE: ICD-10-CM

## 2020-03-18 RX ORDER — LISDEXAMFETAMINE DIMESYLATE 50 MG
50 CAPSULE ORAL DAILY
Qty: 30 CAPSULE | Refills: 0 | Status: SHIPPED | OUTPATIENT
Start: 2020-03-18 | End: 2020-04-13 | Stop reason: SDUPTHER

## 2020-04-01 RX ORDER — ESCITALOPRAM OXALATE 10 MG/1
10 TABLET ORAL DAILY
Qty: 90 TABLET | Refills: 0 | Status: SHIPPED | OUTPATIENT
Start: 2020-04-01 | End: 2020-07-09 | Stop reason: SDUPTHER

## 2020-04-13 DIAGNOSIS — R73.01 IMPAIRED FASTING GLUCOSE: ICD-10-CM

## 2020-04-13 DIAGNOSIS — F90.9 ATTENTION DEFICIT HYPERACTIVITY DISORDER (ADHD), UNSPECIFIED ADHD TYPE: ICD-10-CM

## 2020-04-13 RX ORDER — METFORMIN HYDROCHLORIDE 750 MG/1
TABLET, EXTENDED RELEASE ORAL
Qty: 90 TABLET | Refills: 0 | Status: SHIPPED | OUTPATIENT
Start: 2020-04-13 | End: 2020-12-14 | Stop reason: HOSPADM

## 2020-04-13 RX ORDER — LISDEXAMFETAMINE DIMESYLATE 50 MG
50 CAPSULE ORAL DAILY
Qty: 30 CAPSULE | Refills: 0 | Status: SHIPPED | OUTPATIENT
Start: 2020-04-13 | End: 2020-05-13 | Stop reason: SDUPTHER

## 2020-04-14 RX ORDER — METFORMIN HYDROCHLORIDE 750 MG/1
TABLET, EXTENDED RELEASE ORAL
Qty: 60 TABLET | Refills: 0 | OUTPATIENT
Start: 2020-04-14

## 2020-05-01 DIAGNOSIS — G47.33 OSA (OBSTRUCTIVE SLEEP APNEA): Primary | ICD-10-CM

## 2020-05-13 DIAGNOSIS — F90.9 ATTENTION DEFICIT HYPERACTIVITY DISORDER (ADHD), UNSPECIFIED ADHD TYPE: ICD-10-CM

## 2020-05-14 RX ORDER — LISDEXAMFETAMINE DIMESYLATE 50 MG
50 CAPSULE ORAL DAILY
Qty: 30 CAPSULE | Refills: 0 | Status: SHIPPED | OUTPATIENT
Start: 2020-05-14 | End: 2020-06-11 | Stop reason: SDUPTHER

## 2020-06-11 DIAGNOSIS — F90.9 ATTENTION DEFICIT HYPERACTIVITY DISORDER (ADHD), UNSPECIFIED ADHD TYPE: ICD-10-CM

## 2020-06-11 RX ORDER — LISDEXAMFETAMINE DIMESYLATE 50 MG
50 CAPSULE ORAL DAILY
Qty: 30 CAPSULE | Refills: 0 | Status: SHIPPED | OUTPATIENT
Start: 2020-06-11 | End: 2020-07-07 | Stop reason: SDUPTHER

## 2020-06-12 ENCOUNTER — DOCUMENTATION (OUTPATIENT)
Dept: PULMONOLOGY | Facility: CLINIC | Age: 40
End: 2020-06-12

## 2020-06-15 ENCOUNTER — OFFICE VISIT (OUTPATIENT)
Dept: FAMILY MEDICINE CLINIC | Facility: CLINIC | Age: 40
End: 2020-06-15
Payer: COMMERCIAL

## 2020-06-15 VITALS
OXYGEN SATURATION: 98 % | BODY MASS INDEX: 45.15 KG/M2 | HEART RATE: 74 BPM | SYSTOLIC BLOOD PRESSURE: 114 MMHG | TEMPERATURE: 97 F | WEIGHT: 315 LBS | RESPIRATION RATE: 16 BRPM | DIASTOLIC BLOOD PRESSURE: 64 MMHG

## 2020-06-15 DIAGNOSIS — R73.01 IMPAIRED FASTING GLUCOSE: Primary | ICD-10-CM

## 2020-06-15 DIAGNOSIS — I10 ESSENTIAL HYPERTENSION: ICD-10-CM

## 2020-06-15 DIAGNOSIS — G47.33 OSA (OBSTRUCTIVE SLEEP APNEA): ICD-10-CM

## 2020-06-15 DIAGNOSIS — F90.0 ATTENTION DEFICIT HYPERACTIVITY DISORDER (ADHD), PREDOMINANTLY INATTENTIVE TYPE: ICD-10-CM

## 2020-06-15 PROCEDURE — 1036F TOBACCO NON-USER: CPT | Performed by: FAMILY MEDICINE

## 2020-06-15 PROCEDURE — 3074F SYST BP LT 130 MM HG: CPT | Performed by: FAMILY MEDICINE

## 2020-06-15 PROCEDURE — 99213 OFFICE O/P EST LOW 20 MIN: CPT | Performed by: FAMILY MEDICINE

## 2020-06-15 PROCEDURE — 3078F DIAST BP <80 MM HG: CPT | Performed by: FAMILY MEDICINE

## 2020-07-07 ENCOUNTER — LAB (OUTPATIENT)
Dept: LAB | Facility: CLINIC | Age: 40
End: 2020-07-07
Payer: COMMERCIAL

## 2020-07-07 ENCOUNTER — OFFICE VISIT (OUTPATIENT)
Dept: PULMONOLOGY | Facility: CLINIC | Age: 40
End: 2020-07-07
Payer: COMMERCIAL

## 2020-07-07 ENCOUNTER — TRANSCRIBE ORDERS (OUTPATIENT)
Dept: LAB | Facility: CLINIC | Age: 40
End: 2020-07-07

## 2020-07-07 VITALS
SYSTOLIC BLOOD PRESSURE: 134 MMHG | DIASTOLIC BLOOD PRESSURE: 92 MMHG | OXYGEN SATURATION: 99 % | HEART RATE: 77 BPM | BODY MASS INDEX: 41.75 KG/M2 | TEMPERATURE: 97.1 F | HEIGHT: 73 IN | WEIGHT: 315 LBS

## 2020-07-07 DIAGNOSIS — Z13.6 SCREENING FOR CARDIOVASCULAR CONDITION: ICD-10-CM

## 2020-07-07 DIAGNOSIS — F90.9 ATTENTION DEFICIT HYPERACTIVITY DISORDER (ADHD), UNSPECIFIED ADHD TYPE: ICD-10-CM

## 2020-07-07 DIAGNOSIS — R73.01 IMPAIRED FASTING GLUCOSE: ICD-10-CM

## 2020-07-07 DIAGNOSIS — Z13.1 SCREENING FOR DIABETES MELLITUS: ICD-10-CM

## 2020-07-07 DIAGNOSIS — R53.83 OTHER FATIGUE: ICD-10-CM

## 2020-07-07 DIAGNOSIS — G47.33 OSA (OBSTRUCTIVE SLEEP APNEA): Primary | ICD-10-CM

## 2020-07-07 LAB
ALBUMIN SERPL BCP-MCNC: 3.8 G/DL (ref 3.5–5)
ALP SERPL-CCNC: 92 U/L (ref 46–116)
ALT SERPL W P-5'-P-CCNC: 65 U/L (ref 12–78)
ANION GAP SERPL CALCULATED.3IONS-SCNC: 6 MMOL/L (ref 4–13)
AST SERPL W P-5'-P-CCNC: 32 U/L (ref 5–45)
BASOPHILS # BLD AUTO: 0.05 THOUSANDS/ΜL (ref 0–0.1)
BASOPHILS NFR BLD AUTO: 1 % (ref 0–1)
BILIRUB SERPL-MCNC: 0.52 MG/DL (ref 0.2–1)
BUN SERPL-MCNC: 15 MG/DL (ref 5–25)
CALCIUM SERPL-MCNC: 8.6 MG/DL (ref 8.3–10.1)
CHLORIDE SERPL-SCNC: 103 MMOL/L (ref 100–108)
CHOLEST SERPL-MCNC: 138 MG/DL (ref 50–200)
CO2 SERPL-SCNC: 30 MMOL/L (ref 21–32)
CREAT SERPL-MCNC: 0.85 MG/DL (ref 0.6–1.3)
EOSINOPHIL # BLD AUTO: 0.28 THOUSAND/ΜL (ref 0–0.61)
EOSINOPHIL NFR BLD AUTO: 5 % (ref 0–6)
ERYTHROCYTE [DISTWIDTH] IN BLOOD BY AUTOMATED COUNT: 12.5 % (ref 11.6–15.1)
EST. AVERAGE GLUCOSE BLD GHB EST-MCNC: 111 MG/DL
GFR SERPL CREATININE-BSD FRML MDRD: 111 ML/MIN/1.73SQ M
GLUCOSE P FAST SERPL-MCNC: 97 MG/DL (ref 65–99)
HBA1C MFR BLD: 5.5 %
HCT VFR BLD AUTO: 46.1 % (ref 36.5–49.3)
HDLC SERPL-MCNC: 30 MG/DL
HGB BLD-MCNC: 15.2 G/DL (ref 12–17)
IMM GRANULOCYTES # BLD AUTO: 0.02 THOUSAND/UL (ref 0–0.2)
IMM GRANULOCYTES NFR BLD AUTO: 0 % (ref 0–2)
LDLC SERPL CALC-MCNC: 77 MG/DL (ref 0–100)
LYMPHOCYTES # BLD AUTO: 2.7 THOUSANDS/ΜL (ref 0.6–4.47)
LYMPHOCYTES NFR BLD AUTO: 44 % (ref 14–44)
MCH RBC QN AUTO: 28.6 PG (ref 26.8–34.3)
MCHC RBC AUTO-ENTMCNC: 33 G/DL (ref 31.4–37.4)
MCV RBC AUTO: 87 FL (ref 82–98)
MONOCYTES # BLD AUTO: 0.69 THOUSAND/ΜL (ref 0.17–1.22)
MONOCYTES NFR BLD AUTO: 12 % (ref 4–12)
NEUTROPHILS # BLD AUTO: 2.25 THOUSANDS/ΜL (ref 1.85–7.62)
NEUTS SEG NFR BLD AUTO: 38 % (ref 43–75)
NRBC BLD AUTO-RTO: 0 /100 WBCS
PLATELET # BLD AUTO: 213 THOUSANDS/UL (ref 149–390)
PMV BLD AUTO: 9.6 FL (ref 8.9–12.7)
POTASSIUM SERPL-SCNC: 3.9 MMOL/L (ref 3.5–5.3)
PROT SERPL-MCNC: 7.2 G/DL (ref 6.4–8.2)
RBC # BLD AUTO: 5.32 MILLION/UL (ref 3.88–5.62)
SODIUM SERPL-SCNC: 139 MMOL/L (ref 136–145)
TRIGL SERPL-MCNC: 154 MG/DL
TSH SERPL DL<=0.05 MIU/L-ACNC: 2.16 UIU/ML (ref 0.36–3.74)
WBC # BLD AUTO: 5.99 THOUSAND/UL (ref 4.31–10.16)

## 2020-07-07 PROCEDURE — 36415 COLL VENOUS BLD VENIPUNCTURE: CPT

## 2020-07-07 PROCEDURE — 99213 OFFICE O/P EST LOW 20 MIN: CPT | Performed by: INTERNAL MEDICINE

## 2020-07-07 PROCEDURE — 3080F DIAST BP >= 90 MM HG: CPT | Performed by: INTERNAL MEDICINE

## 2020-07-07 PROCEDURE — 80053 COMPREHEN METABOLIC PANEL: CPT

## 2020-07-07 PROCEDURE — 80061 LIPID PANEL: CPT

## 2020-07-07 PROCEDURE — 1036F TOBACCO NON-USER: CPT | Performed by: INTERNAL MEDICINE

## 2020-07-07 PROCEDURE — 85025 COMPLETE CBC W/AUTO DIFF WBC: CPT

## 2020-07-07 PROCEDURE — 83036 HEMOGLOBIN GLYCOSYLATED A1C: CPT

## 2020-07-07 PROCEDURE — 84443 ASSAY THYROID STIM HORMONE: CPT

## 2020-07-07 PROCEDURE — 3075F SYST BP GE 130 - 139MM HG: CPT | Performed by: INTERNAL MEDICINE

## 2020-07-07 RX ORDER — LISDEXAMFETAMINE DIMESYLATE 50 MG
50 CAPSULE ORAL DAILY
Qty: 30 CAPSULE | Refills: 0 | Status: SHIPPED | OUTPATIENT
Start: 2020-07-07 | End: 2020-08-11 | Stop reason: SDUPTHER

## 2020-07-07 NOTE — LETTER
July 8, 2020     Rosibel Mcdermott, [address] [address]  [address] [address]    Patient: Mavis Moy   YOB: 1980   Date of Visit: 7/7/2020       Dear Dr Ingrid Dong:    Thank you for referring Mavis Moy to me for evaluation  Below are my notes for this consultation  If you have questions, please do not hesitate to call me  I look forward to following your patient along with you  Sincerely,        Beverley Dobbins DO        CC: No Recipients  Beverley Dobbins DO  7/8/2020  3:34 PM  Sign at close encounter  Progress Note - Sleep Medicine  Mavis Moy 42 y o  male MRN: [id number]       Impression & Plan:   43 y/o M with PMHx of JAMIE on autoPAP, HTN and ADHD on Vyvanse who comes in for daytime sleepiness despite CPAP use  1   Excessive daytime sleepiness despite CPAP/ADHD      -  Management of JAMIE as below      -  I encouraged him to be more consistent with the use of his CPAP  -  Continue Vyvanse 50mg PO Daily  Renewed Rx today      -  If sleepiness improves with adjustment as below will attempt to wean both Vyvanse and Toprol         2  JAMIE on autoPAP 7-15 (DME - [facility]) -  still snoring with CPAP  -  Increase autoPAP pressure to 9-20 and evaluate if snoring has resolved  -    If this does not improving snoring and sleepiness, I will move forward with CPAP titration study with TCO2 monitoring starting at CPAP pressure of 9       - Follow compliance in 2 months        3    Decreased sleep latency in MSLT   This implies hypersomnolence but could be due to insufficient sleep and what was likely delayed sleep phase        ______________________________________________________________________    HPI:    Mavis Moy presents today for follow-up of his JAMIE and excessive daytime sleepiness  He states that overall he has been feeling well  He has been told by his wife that he has been snoring on his CPAP    He blames this often on head positioning and forgetting to put on the CPAP  He even notes that there are times he feels like the pressure is running too low  He also notes daytime sleepiness on certain days  He has been using Vyvanse 50mg Daily  He was wondering if some of the fatigue had to do with metoprolol he has required due to blood pressure issues  Review of Systems:  Review of Systems   Constitutional: Negative  HENT: Negative  Respiratory: Negative  Cardiovascular: Negative  Gastrointestinal: Negative  Endocrine: Negative  Genitourinary: Negative  Musculoskeletal: Negative  Allergic/Immunologic: Negative  Neurological: Negative  Hematological: Negative  Psychiatric/Behavioral: Negative            Social history updates:  Social History     Tobacco Use   Smoking Status Never Smoker   Smokeless Tobacco Never Used     Social History     Socioeconomic History    Marital status: /Civil Union     Spouse name: Not on file    Number of children: Not on file    Years of education: Not on file    Highest education level: Not on file   Occupational History    Not on file   Social Needs    Financial resource strain: Not on file    Food insecurity:     Worry: Not on file     Inability: Not on file    Transportation needs:     Medical: Not on file     Non-medical: Not on file   Tobacco Use    Smoking status: Never Smoker    Smokeless tobacco: Never Used   Substance and Sexual Activity    Alcohol use: Yes     Frequency: Monthly or less     Drinks per session: 1 or 2     Binge frequency: Never     Comment: rare    Drug use: No    Sexual activity: Not on file   Lifestyle    Physical activity:     Days per week: Not on file     Minutes per session: Not on file    Stress: Not on file   Relationships    Social connections:     Talks on phone: Not on file     Gets together: Not on file     Attends Orthodoxy service: Not on file     Active member of club or organization: Not on file     Attends meetings of clubs or organizations: Not on file     Relationship status: Not on file    Intimate partner violence:     Fear of current or ex partner: Not on file     Emotionally abused: Not on file     Physically abused: Not on file     Forced sexual activity: Not on file   Other Topics Concern    Not on file   Social History Narrative    Not on file       PhysicalExamination:  Vitals:   /92 (BP Location: Left arm, Patient Position: Sitting)   Pulse 77   Temp (!) 97 1 °F (36 2 °C) (Tympanic)   Ht 6' 1" (1 854 m)   Wt (!) 149 kg (328 lb 9 6 oz)   SpO2 99%   BMI 43 35 kg/m²    General: Pleasant, Awake alert and oriented x 3, conversant without conversational dyspnea, NAD, normal affect, mild somnolence  HEENT:  PERRL, Sclera noninjected, nonicteric OU, Nares patent,  no craniofacial abnormalities, Mucous membranes, moist, no oral lesions, normal dentition, Mallampati class 4  NECK: Trachea midline, no accessory muscle use, no stridor, no cervical or supraclavicular adenopathy, JVP not elevated  CARDIAC: Reg, single s1/S2, no m/r/g  PULM: CTA bilaterally no wheezing, rhonchi or rales  ABD: Normoactive bowel sounds, soft nontender, nondistended, no rebound, no rigidity, no guarding  EXT: No cyanosis, no clubbing, no edema, normal capillary refill  NEURO: no focal neurologic deficits, AAOx3, moving all extremities appropriately      Diagnostic Data:  Labs: I personally reviewed the most recent laboratory data pertinent to today's visit  I have personally reviewed pertinent lab results    Lab Results   Component Value Date    WBC 5 99 07/07/2020    HGB 15 2 07/07/2020    HCT 46 1 07/07/2020    MCV 87 07/07/2020     07/07/2020     Lab Results   Component Value Date    GLUCOSE 122 02/10/2016    CALCIUM 8 6 07/07/2020    K 3 9 07/07/2020    CO2 30 07/07/2020     07/07/2020    BUN 15 07/07/2020    CREATININE 0 85 07/07/2020     No results found for: IGE  Lab Results   Component Value Date    ALT 65 07/07/2020    AST 32 07/07/2020    ALKPHOS 92 07/07/2020       Sleep studies:  CPAP with MSLT on CPAP:  Mr  Erendira Douglas underwent a CPAP titration and was found to have a more optimal pressure of 10  Therefore, I recommend increasing min autoPAP pressure to 10 with max pressure of 20 using the [employer] interface  Evaluate compliance data in 1-2 months  In addition, he had a decreased sleep latency of 1 minute which implies hypersomnia   MSLT with CPAP pending       New Compliance Data:   5/7/20 - 7/5/20                                  Type of CPAP:  autoPAP  7-15, mean pressure 8 9, max pressure 11 9                                   Percent usage: 97%, 68% > = 4hrs                                   Average time used: 4 hrs 56 mins                                   Residual AHI: 2 2    Compliance Data:  Type of CPAP:  autoPAP 5-10                                   Percent usage: 50%                                   Average time used: 4 hrs 15 mins                                  8 5L leak                                   Residual AHI: 2 6                                                                              Faith Schulz, DO

## 2020-07-08 ENCOUNTER — TELEPHONE (OUTPATIENT)
Dept: SLEEP CENTER | Facility: CLINIC | Age: 40
End: 2020-07-08

## 2020-07-08 ENCOUNTER — DOCUMENTATION (OUTPATIENT)
Dept: PULMONOLOGY | Facility: CLINIC | Age: 40
End: 2020-07-08

## 2020-07-08 DIAGNOSIS — Z20.822 ENCOUNTER FOR LABORATORY TESTING FOR COVID-19 VIRUS: Primary | ICD-10-CM

## 2020-07-08 NOTE — PROGRESS NOTES
Progress Note - Sleep Medicine  Demi Ziegler 42 y o  male MRN: [id number]       Impression & Plan:   41 y/o M with PMHx of JAMIE on autoPAP, HTN and ADHD on Vyvanse who comes in for daytime sleepiness despite CPAP use  1   Excessive daytime sleepiness despite CPAP/ADHD      -  Management of JAMIE as below      -  I encouraged him to be more consistent with the use of his CPAP  -  Continue Vyvanse 50mg PO Daily  Renewed Rx today      -  If sleepiness improves with adjustment as below will attempt to wean both Vyvanse and Toprol         2  JAMIE on autoPAP 7-15 (DME - [facility]) - still snoring with CPAP  -  Increase autoPAP pressure to 9-20 and evaluate if snoring has resolved  -  If this does not improving snoring and sleepiness, I will move forward with CPAP titration study with TCO2 monitoring starting at CPAP pressure of 9       - Follow compliance in 2 months        3    Decreased sleep latency in MSLT   This implies hypersomnolence but could be due to insufficient sleep and what was likely delayed sleep phase        ______________________________________________________________________    HPI:    Demi Ziegler presents today for follow-up of his JAMIE and excessive daytime sleepiness  He states that overall he has been feeling well  He has been told by his wife that he has been snoring on his CPAP  He blames this often on head positioning and forgetting to put on the CPAP  He even notes that there are times he feels like the pressure is running too low  He also notes daytime sleepiness on certain days  He has been using Vyvanse 50mg Daily  He was wondering if some of the fatigue had to do with metoprolol he has required due to blood pressure issues  Review of Systems:  Review of Systems   Constitutional: Negative  HENT: Negative  Respiratory: Negative  Cardiovascular: Negative  Gastrointestinal: Negative  Endocrine: Negative  Genitourinary: Negative  Musculoskeletal: Negative  Allergic/Immunologic: Negative  Neurological: Negative  Hematological: Negative  Psychiatric/Behavioral: Negative            Social history updates:  Social History     Tobacco Use   Smoking Status Never Smoker   Smokeless Tobacco Never Used     Social History     Socioeconomic History    Marital status: /Civil Union     Spouse name: Not on file    Number of children: Not on file    Years of education: Not on file    Highest education level: Not on file   Occupational History    Not on file   Social Needs    Financial resource strain: Not on file    Food insecurity:     Worry: Not on file     Inability: Not on file    Transportation needs:     Medical: Not on file     Non-medical: Not on file   Tobacco Use    Smoking status: Never Smoker    Smokeless tobacco: Never Used   Substance and Sexual Activity    Alcohol use: Yes     Frequency: Monthly or less     Drinks per session: 1 or 2     Binge frequency: Never     Comment: rare    Drug use: No    Sexual activity: Not on file   Lifestyle    Physical activity:     Days per week: Not on file     Minutes per session: Not on file    Stress: Not on file   Relationships    Social connections:     Talks on phone: Not on file     Gets together: Not on file     Attends Hindu service: Not on file     Active member of club or organization: Not on file     Attends meetings of clubs or organizations: Not on file     Relationship status: Not on file    Intimate partner violence:     Fear of current or ex partner: Not on file     Emotionally abused: Not on file     Physically abused: Not on file     Forced sexual activity: Not on file   Other Topics Concern    Not on file   Social History Narrative    Not on file       PhysicalExamination:  Vitals:   /92 (BP Location: Left arm, Patient Position: Sitting)   Pulse 77   Temp (!) 97 1 °F (36 2 °C) (Tympanic)   Ht 6' 1" (1 854 m)   Wt (!) 149 kg (328 lb 9 6 oz)   SpO2 99%   BMI 43 35 kg/m²   General: Pleasant, Awake alert and oriented x 3, conversant without conversational dyspnea, NAD, normal affect, mild somnolence  HEENT:  PERRL, Sclera noninjected, nonicteric OU, Nares patent,  no craniofacial abnormalities, Mucous membranes, moist, no oral lesions, normal dentition, Mallampati class 4  NECK: Trachea midline, no accessory muscle use, no stridor, no cervical or supraclavicular adenopathy, JVP not elevated  CARDIAC: Reg, single s1/S2, no m/r/g  PULM: CTA bilaterally no wheezing, rhonchi or rales  ABD: Normoactive bowel sounds, soft nontender, nondistended, no rebound, no rigidity, no guarding  EXT: No cyanosis, no clubbing, no edema, normal capillary refill  NEURO: no focal neurologic deficits, AAOx3, moving all extremities appropriately      Diagnostic Data:  Labs: I personally reviewed the most recent laboratory data pertinent to today's visit  I have personally reviewed pertinent lab results  Lab Results   Component Value Date    WBC 5 99 07/07/2020    HGB 15 2 07/07/2020    HCT 46 1 07/07/2020    MCV 87 07/07/2020     07/07/2020     Lab Results   Component Value Date    GLUCOSE 122 02/10/2016    CALCIUM 8 6 07/07/2020    K 3 9 07/07/2020    CO2 30 07/07/2020     07/07/2020    BUN 15 07/07/2020    CREATININE 0 85 07/07/2020     No results found for: IGE  Lab Results   Component Value Date    ALT 65 07/07/2020    AST 32 07/07/2020    ALKPHOS 92 07/07/2020       Sleep studies:  CPAP with MSLT on CPAP:  Mr Van Workman underwent a CPAP titration and was found to have a more optimal pressure of 10  Therefore, I recommend increasing min autoPAP pressure to 10 with max pressure of 20 using the [email or web address] interface  Evaluate compliance data in 1-2 months  In addition, he had a decreased sleep latency of 1 minute which implies hypersomnia   MSLT with CPAP pending       New Compliance Data:  5/7/20 - 7/5/20                                  Type of CPAP: autoPAP 7-15, mean pressure 8 9, max pressure 11 9                                   Percent usage: 97%, 68% > = 4hrs                                   Average time used: 4 hrs 56 mins                                   Residual AHI: 2 2    Compliance Data:  Type of CPAP:  autoPAP 5-10                                   Percent usage: 50%                                   Average time used: 4 hrs 15 mins                                  8 5L leak                                   Residual AHI: 2 6                                                                              Kalia Colbert, DO

## 2020-07-08 NOTE — TELEPHONE ENCOUNTER
Left message for patient, advised he will need to go for COVID testing 8/11/2020 for sleep study scheduled 8/18/2020    Advised to call with questions or concerns    COVID order placed

## 2020-07-09 DIAGNOSIS — F32.A ANXIETY AND DEPRESSION: ICD-10-CM

## 2020-07-09 DIAGNOSIS — I10 ESSENTIAL HYPERTENSION: ICD-10-CM

## 2020-07-09 DIAGNOSIS — F41.9 ANXIETY AND DEPRESSION: ICD-10-CM

## 2020-07-09 RX ORDER — LOSARTAN POTASSIUM 100 MG/1
100 TABLET ORAL DAILY
Qty: 30 TABLET | Refills: 5 | Status: SHIPPED | OUTPATIENT
Start: 2020-07-09 | End: 2020-10-23 | Stop reason: SDUPTHER

## 2020-07-09 RX ORDER — METOPROLOL SUCCINATE 50 MG/1
50 TABLET, EXTENDED RELEASE ORAL DAILY
Qty: 30 TABLET | Refills: 5 | Status: SHIPPED | OUTPATIENT
Start: 2020-07-09 | End: 2020-12-14 | Stop reason: HOSPADM

## 2020-07-09 RX ORDER — ESCITALOPRAM OXALATE 10 MG/1
10 TABLET ORAL DAILY
Qty: 90 TABLET | Refills: 0 | Status: SHIPPED | OUTPATIENT
Start: 2020-07-09 | End: 2020-10-23 | Stop reason: SDUPTHER

## 2020-07-09 NOTE — TELEPHONE ENCOUNTER
Requested medication(s) are due for refill today: Yes  Patient has already received a courtesy refill: No  Other reason request has been forwarded to provider:    PER PROTOCOL

## 2020-08-11 ENCOUNTER — TELEPHONE (OUTPATIENT)
Dept: DERMATOLOGY | Facility: CLINIC | Age: 40
End: 2020-08-11

## 2020-08-11 DIAGNOSIS — F90.9 ATTENTION DEFICIT HYPERACTIVITY DISORDER (ADHD), UNSPECIFIED ADHD TYPE: ICD-10-CM

## 2020-08-11 RX ORDER — LISDEXAMFETAMINE DIMESYLATE 50 MG
50 CAPSULE ORAL DAILY
Qty: 30 CAPSULE | Refills: 0 | Status: SHIPPED | OUTPATIENT
Start: 2020-08-11 | End: 2020-09-08 | Stop reason: SDUPTHER

## 2020-08-11 NOTE — TELEPHONE ENCOUNTER
Patient wife called in to change her husbands appointment for a later date and time   That has been done  New date is 09/17/2020 at 8:45

## 2020-08-12 DIAGNOSIS — Z20.822 ENCOUNTER FOR LABORATORY TESTING FOR COVID-19 VIRUS: ICD-10-CM

## 2020-08-12 PROCEDURE — U0003 INFECTIOUS AGENT DETECTION BY NUCLEIC ACID (DNA OR RNA); SEVERE ACUTE RESPIRATORY SYNDROME CORONAVIRUS 2 (SARS-COV-2) (CORONAVIRUS DISEASE [COVID-19]), AMPLIFIED PROBE TECHNIQUE, MAKING USE OF HIGH THROUGHPUT TECHNOLOGIES AS DESCRIBED BY CMS-2020-01-R: HCPCS | Performed by: INTERNAL MEDICINE

## 2020-08-14 LAB — SARS-COV-2 RNA SPEC QL NAA+PROBE: NOT DETECTED

## 2020-08-18 ENCOUNTER — HOSPITAL ENCOUNTER (OUTPATIENT)
Dept: SLEEP CENTER | Facility: CLINIC | Age: 40
Discharge: HOME | End: 2020-08-18
Payer: COMMERCIAL

## 2020-08-18 DIAGNOSIS — G47.33 OSA (OBSTRUCTIVE SLEEP APNEA): ICD-10-CM

## 2020-08-18 PROCEDURE — 95811 POLYSOM 6/>YRS CPAP 4/> PARM: CPT | Performed by: INTERNAL MEDICINE

## 2020-08-18 PROCEDURE — 95811 POLYSOM 6/>YRS CPAP 4/> PARM: CPT

## 2020-08-19 NOTE — PROGRESS NOTES
Sleep Study Documentation    Pre-Sleep Study       Sleep testing procedure explained to patient:YES    Patient napped prior to study:NO    Caffeine:Dayshift worker after 12PM   Caffeine use:YES- coffee  6 to 18 ounces    Alcohol:Dayshift workers after 5PM: Alcohol use:NO    Typical day for patient:YES       Study Documentation    Sleep Study Indications: G47 [id number]    Sleep Study: Treatment   Optimal PAP pressure: 10  Leak:Small  Snore:Eliminated  REM Obtained:yes  Supplemental O2: no    Minimum SaO2 88  Baseline SaO2 95  PAP mask tried (list all) Resmed Airfit F20 and Chetna View  PAP mask choice (final) Respironics Chetna View  PAP mask type:full face  PAP pressure at which snoring was eliminated 9  Minimum SaO2 at final PAP pressure 88  Mode of Therapy:CPAP  ETCO2  CPAP changed to BiPAP:No    Mode of Therapy:    EKG abnormalities: no     EEG abnormalities: no    Sleep Study Recorded < 2 hours: N/A    Sleep Study Recorded > 2 hours but incomplete study: N/A    Sleep Study Recorded 6 hours but no sleep obtained: NO    Patient classification: employed       Post-Sleep Study    Medication used at bedtime or during sleep study:NO    Patient reports time it took to fall asleep:less than 20 minutes    Patient reports waking up during study:1 to 2 times  Patient reports returning to sleep without difficulty  Patient reports sleeping 4 to 6 hours without dreaming  Patient reports sleep during study:worse than usual    Patient rated sleepiness: Not sleepy or tired    PAP treatment:yes: Post PAP treatment patient reports feeling unsure if a change is noted and  would wear PAP mask at home

## 2020-08-20 DIAGNOSIS — G47.61 PERIODIC LIMB MOVEMENT: Primary | ICD-10-CM

## 2020-08-20 DIAGNOSIS — G47.33 OSA (OBSTRUCTIVE SLEEP APNEA): ICD-10-CM

## 2020-08-21 ENCOUNTER — TELEPHONE (OUTPATIENT)
Dept: PULMONOLOGY | Facility: HOSPITAL | Age: 40
End: 2020-08-21

## 2020-08-21 DIAGNOSIS — G47.61 PERIODIC LIMB MOVEMENT DISORDER: Primary | ICD-10-CM

## 2020-08-21 RX ORDER — GABAPENTIN 100 MG/1
300 CAPSULE ORAL
Qty: 90 CAPSULE | Refills: 0 | Status: SHIPPED | OUTPATIENT
Start: 2020-08-21 | End: 2020-12-14 | Stop reason: HOSPADM

## 2020-08-21 NOTE — TELEPHONE ENCOUNTER
[address] from [place] called she is unable to make the change at this moment info was deleted  She called and left message for patient to call her back with the serial number on his cpap machine and she will add new info when she speaks to him

## 2020-08-22 ENCOUNTER — APPOINTMENT (OUTPATIENT)
Dept: LAB | Facility: CLINIC | Age: 40
End: 2020-08-22
Payer: COMMERCIAL

## 2020-08-22 DIAGNOSIS — G47.61 PERIODIC LIMB MOVEMENT: ICD-10-CM

## 2020-08-22 LAB
FERRITIN SERPL-MCNC: 133 NG/ML (ref 8–388)
IRON SERPL-MCNC: 77 UG/DL (ref 65–175)
MAGNESIUM SERPL-MCNC: 1.5 MG/DL (ref 1.6–2.6)
TIBC SERPL-MCNC: 343 UG/DL (ref 250–450)

## 2020-08-22 PROCEDURE — 83550 IRON BINDING TEST: CPT

## 2020-08-22 PROCEDURE — 36415 COLL VENOUS BLD VENIPUNCTURE: CPT

## 2020-08-22 PROCEDURE — 82728 ASSAY OF FERRITIN: CPT

## 2020-08-22 PROCEDURE — 83735 ASSAY OF MAGNESIUM: CPT

## 2020-08-22 PROCEDURE — 83540 ASSAY OF IRON: CPT

## 2020-09-08 DIAGNOSIS — F90.9 ATTENTION DEFICIT HYPERACTIVITY DISORDER (ADHD), UNSPECIFIED ADHD TYPE: ICD-10-CM

## 2020-09-08 RX ORDER — LISDEXAMFETAMINE DIMESYLATE 50 MG
50 CAPSULE ORAL DAILY
Qty: 30 CAPSULE | Refills: 0 | Status: SHIPPED | OUTPATIENT
Start: 2020-09-08 | End: 2020-10-13 | Stop reason: SDUPTHER

## 2020-09-15 ENCOUNTER — TELEMEDICINE (OUTPATIENT)
Dept: FAMILY MEDICINE CLINIC | Facility: CLINIC | Age: 40
End: 2020-09-15
Payer: COMMERCIAL

## 2020-09-15 VITALS — TEMPERATURE: 99 F

## 2020-09-15 DIAGNOSIS — M79.10 MYALGIA: ICD-10-CM

## 2020-09-15 DIAGNOSIS — T63.441A BEE STING, ACCIDENTAL OR UNINTENTIONAL, INITIAL ENCOUNTER: Primary | ICD-10-CM

## 2020-09-15 DIAGNOSIS — R50.9 FEVER, UNSPECIFIED FEVER CAUSE: ICD-10-CM

## 2020-09-15 DIAGNOSIS — R50.9 FEVER, UNSPECIFIED FEVER CAUSE: Primary | ICD-10-CM

## 2020-09-15 PROCEDURE — U0003 INFECTIOUS AGENT DETECTION BY NUCLEIC ACID (DNA OR RNA); SEVERE ACUTE RESPIRATORY SYNDROME CORONAVIRUS 2 (SARS-COV-2) (CORONAVIRUS DISEASE [COVID-19]), AMPLIFIED PROBE TECHNIQUE, MAKING USE OF HIGH THROUGHPUT TECHNOLOGIES AS DESCRIBED BY CMS-2020-01-R: HCPCS | Performed by: INTERNAL MEDICINE

## 2020-09-15 PROCEDURE — 99213 OFFICE O/P EST LOW 20 MIN: CPT | Performed by: FAMILY MEDICINE

## 2020-09-15 RX ORDER — CEPHALEXIN 500 MG/1
500 CAPSULE ORAL EVERY 6 HOURS SCHEDULED
Qty: 40 CAPSULE | Refills: 0 | Status: SHIPPED | OUTPATIENT
Start: 2020-09-15 | End: 2020-09-25

## 2020-09-15 NOTE — PROGRESS NOTES
I was called by Joe's wife about a fever, headache and fatigue he developed  He denies cugh, shortness of breath or wheezing  He denies loss of taste or smell

## 2020-09-15 NOTE — PROGRESS NOTES
Virtual Regular Visit      Assessment/Plan:    Problem List Items Addressed This Visit     None      Visit Diagnoses     Bee sting, accidental or unintentional, initial encounter    -  Primary    Pt stable  Likely mild rxn to multiple stings; not anaphylaxis, doubt serum sickness  Cold compresses / OTC Benadryl PRN; precautions given  Relevant Medications    cephalexin (KEFLEX) 500 mg capsule    Fever, unspecified fever cause        As above  Starting Cephalexin as a precaution  Doubt COVID-19 - but does have testing that he is already going to complete  Precautions  Relevant Medications    cephalexin (KEFLEX) 500 mg capsule    Myalgia        As above  Reason for visit is   Chief Complaint   Patient presents with    Virtual Regular Visit        Encounter provider Citlalli Burger DO    Provider located at [address] [address]      Recent Visits  No visits were found meeting these conditions  Showing recent visits within past 7 days and meeting all other requirements     Today's Visits  Date Type Provider Dept   09/15/20 Telemedicine [facility]k Soliman DO  Melissa Ahn   Showing today's visits and meeting all other requirements     Future Appointments  No visits were found meeting these conditions  Showing future appointments within next 150 days and meeting all other requirements        The patient was identified by name and date of birth  [facility] was informed that this is a telemedicine visit and that the visit is being conducted through [facility] [place], [place] and patient was informed that this is a secure, HIPAA-compliant platform  He agrees to proceed     My office door was closed  No one else was in the room  He acknowledged consent and understanding of privacy and security of the video platform  The patient has agreed to participate and understands they can discontinue the visit at any time      Patient is aware this is a billable service  Subjective  Emma Armstrong is a 42 y o  male - Recent bee stings  4 Days ago, pt was in usual state of health, mowing the grass - unknowingly ran over a bees nest (yellow jackets)  Garry Mott was stung approx 10 times  Developed fatigue, chills, back pain, etc, yesterday  No throat or tongue swelling  No CP/SOB  Tmax of 101F last night  Has a COVID-19 Test ordered already by Pulmonology/Sleep Med - will do today; we discussed at length  No hx of previous reaction         Past Medical History:   Diagnosis Date    ADHD (attention deficit hyperactivity disorder)     Hypersomnolence     Hypertension     Hypoxia     Infrapatellar bursitis of right knee     Mycobacterial infectious disease     JAMIE (obstructive sleep apnea)        Past Surgical History:   Procedure Laterality Date    CARPAL TUNNEL RELEASE Bilateral     TONSILLECTOMY         Current Outpatient Medications   Medication Sig Dispense Refill    cephalexin (KEFLEX) 500 mg capsule Take 1 capsule (500 mg total) by mouth every 6 (six) hours for 10 days 40 capsule 0    escitalopram (LEXAPRO) 10 mg tablet Take 1 tablet (10 mg total) by mouth daily 90 tablet 0    fexofenadine (ALLEGRA) 180 MG tablet Take 180 mg by mouth as needed        gabapentin (NEURONTIN) 100 mg capsule Take 3 capsules (300 mg total) by mouth daily at bedtime 90 capsule 0    hydrocortisone (ANUSOL-HC) 25 mg suppository Insert 1 suppository (25 mg total) into the rectum 2 (two) times a day as needed for hemorrhoids 12 suppository 1    losartan (COZAAR) 100 MG tablet Take 1 tablet (100 mg total) by mouth daily 30 tablet 5    metFORMIN (GLUCOPHAGE-XR) 750 mg 24 hr tablet Take 1 tablet daily in AM for 2 weeks and then increase to 2 tablets daily in AM 90 tablet 0    metoprolol succinate (TOPROL-XL) 50 mg 24 hr tablet Take 1 tablet (50 mg total) by mouth daily 30 tablet 5    Vyvanse 50 MG capsule Take 1 capsule (50 mg total) by mouth dailyMax Daily Amount: 50 mg 30 capsule 0     No current facility-administered medications for this visit  Allergies   Allergen Reactions    Olmesartan-Amlodipine-Hctz Edema    Terazosin Edema       Review of Systems   Constitutional: Positive for chills and fatigue  Negative for activity change  HENT: Positive for postnasal drip  Respiratory: Negative for cough and shortness of breath  Cardiovascular: Negative for chest pain  Gastrointestinal: Negative for diarrhea  Musculoskeletal: Positive for back pain and myalgias  Neurological: Positive for headaches  Video Exam    Vitals:    09/15/20 1050   Temp: 99 °F (37 2 °C)   TempSrc: Temporal       Physical Exam  Vitals signs and nursing note reviewed  Constitutional:       General: He is not in acute distress  Appearance: Normal appearance  He is not ill-appearing, toxic-appearing or diaphoretic  HENT:      Head: Normocephalic and atraumatic  Eyes:      General: No scleral icterus  Conjunctiva/sclera: Conjunctivae normal    Neurological:      Mental Status: He is alert  I spent 15 minutes with patient today in which greater than 50% of the time was spent in counseling/coordination of care regarding his symptoms  VIRTUAL VISIT DISCLAIMER    Roseanne Saldaña acknowledges that he has consented to an online visit or consultation  He understands that the online visit is based solely on information provided by him, and that, in the absence of a face-to-face physical evaluation by the physician, the diagnosis he receives is both limited and provisional in terms of accuracy and completeness  This is not intended to replace a full medical face-to-face evaluation by the physician  Roseanne Saldaña understands and accepts these terms

## 2020-09-16 LAB — SARS-COV-2 RNA SPEC QL NAA+PROBE: NOT DETECTED

## 2020-09-17 ENCOUNTER — TELEPHONE (OUTPATIENT)
Dept: FAMILY MEDICINE CLINIC | Facility: CLINIC | Age: 40
End: 2020-09-17

## 2020-09-17 ENCOUNTER — APPOINTMENT (EMERGENCY)
Dept: RADIOLOGY | Facility: HOSPITAL | Age: 40
End: 2020-09-17
Payer: COMMERCIAL

## 2020-09-17 ENCOUNTER — HOSPITAL ENCOUNTER (EMERGENCY)
Facility: HOSPITAL | Age: 40
Discharge: HOME | End: 2020-09-17
Attending: EMERGENCY MEDICINE | Admitting: EMERGENCY MEDICINE
Payer: COMMERCIAL

## 2020-09-17 VITALS
DIASTOLIC BLOOD PRESSURE: 62 MMHG | SYSTOLIC BLOOD PRESSURE: 136 MMHG | WEIGHT: 315 LBS | BODY MASS INDEX: 41.75 KG/M2 | HEART RATE: 77 BPM | TEMPERATURE: 98.7 F | OXYGEN SATURATION: 98 % | RESPIRATION RATE: 16 BRPM | HEIGHT: 73 IN

## 2020-09-17 DIAGNOSIS — J18.9 PNEUMONIA: Primary | ICD-10-CM

## 2020-09-17 LAB
ALBUMIN SERPL BCP-MCNC: 3.1 G/DL (ref 3.5–5)
ALP SERPL-CCNC: 69 U/L (ref 46–116)
ALT SERPL W P-5'-P-CCNC: 42 U/L (ref 12–78)
ANION GAP SERPL CALCULATED.3IONS-SCNC: 8 MMOL/L (ref 4–13)
AST SERPL W P-5'-P-CCNC: 30 U/L (ref 5–45)
BACTERIA UR QL AUTO: ABNORMAL /HPF
BASOPHILS # BLD AUTO: 0.05 THOUSANDS/ΜL (ref 0–0.1)
BASOPHILS NFR BLD AUTO: 1 % (ref 0–1)
BILIRUB SERPL-MCNC: 0.48 MG/DL (ref 0.2–1)
BILIRUB UR QL STRIP: NEGATIVE
BUN SERPL-MCNC: 12 MG/DL (ref 5–25)
CALCIUM ALBUM COR SERPL-MCNC: 8.9 MG/DL (ref 8.3–10.1)
CALCIUM SERPL-MCNC: 8.2 MG/DL (ref 8.3–10.1)
CHLORIDE SERPL-SCNC: 100 MMOL/L (ref 100–108)
CK MB SERPL-MCNC: 3.8 NG/ML (ref 0–5)
CK MB SERPL-MCNC: <1 % (ref 0–2.5)
CK SERPL-CCNC: 670 U/L (ref 39–308)
CLARITY UR: CLEAR
CO2 SERPL-SCNC: 26 MMOL/L (ref 21–32)
COLOR UR: YELLOW
CREAT SERPL-MCNC: 0.87 MG/DL (ref 0.6–1.3)
CRP SERPL QL: 137.2 MG/L
EOSINOPHIL # BLD AUTO: 0.1 THOUSAND/ΜL (ref 0–0.61)
EOSINOPHIL NFR BLD AUTO: 1 % (ref 0–6)
ERYTHROCYTE [DISTWIDTH] IN BLOOD BY AUTOMATED COUNT: 12.7 % (ref 11.6–15.1)
ERYTHROCYTE [SEDIMENTATION RATE] IN BLOOD: 50 MM/HOUR (ref 0–14)
GFR SERPL CREATININE-BSD FRML MDRD: 110 ML/MIN/1.73SQ M
GLUCOSE SERPL-MCNC: 116 MG/DL (ref 65–140)
GLUCOSE UR STRIP-MCNC: NEGATIVE MG/DL
HCT VFR BLD AUTO: 41.6 % (ref 36.5–49.3)
HGB BLD-MCNC: 13.7 G/DL (ref 12–17)
HGB UR QL STRIP.AUTO: NEGATIVE
IMM GRANULOCYTES # BLD AUTO: 0.05 THOUSAND/UL (ref 0–0.2)
IMM GRANULOCYTES NFR BLD AUTO: 1 % (ref 0–2)
KETONES UR STRIP-MCNC: NEGATIVE MG/DL
LEUKOCYTE ESTERASE UR QL STRIP: NEGATIVE
LYMPHOCYTES # BLD AUTO: 1.84 THOUSANDS/ΜL (ref 0.6–4.47)
LYMPHOCYTES NFR BLD AUTO: 18 % (ref 14–44)
MCH RBC QN AUTO: 28.8 PG (ref 26.8–34.3)
MCHC RBC AUTO-ENTMCNC: 32.9 G/DL (ref 31.4–37.4)
MCV RBC AUTO: 87 FL (ref 82–98)
MONOCYTES # BLD AUTO: 1.55 THOUSAND/ΜL (ref 0.17–1.22)
MONOCYTES NFR BLD AUTO: 16 % (ref 4–12)
NEUTROPHILS # BLD AUTO: 6.4 THOUSANDS/ΜL (ref 1.85–7.62)
NEUTS SEG NFR BLD AUTO: 63 % (ref 43–75)
NITRITE UR QL STRIP: NEGATIVE
NON-SQ EPI CELLS URNS QL MICRO: ABNORMAL /HPF
NRBC BLD AUTO-RTO: 0 /100 WBCS
PH UR STRIP.AUTO: 6 [PH] (ref 4.5–8)
PLATELET # BLD AUTO: 193 THOUSANDS/UL (ref 149–390)
PMV BLD AUTO: 9.6 FL (ref 8.9–12.7)
POTASSIUM SERPL-SCNC: 3.9 MMOL/L (ref 3.5–5.3)
PROT SERPL-MCNC: 7.6 G/DL (ref 6.4–8.2)
PROT UR STRIP-MCNC: ABNORMAL MG/DL
RBC # BLD AUTO: 4.76 MILLION/UL (ref 3.88–5.62)
RBC #/AREA URNS AUTO: ABNORMAL /HPF
SODIUM SERPL-SCNC: 134 MMOL/L (ref 136–145)
SP GR UR STRIP.AUTO: 1.02 (ref 1–1.03)
UROBILINOGEN UR QL STRIP.AUTO: 0.2 E.U./DL
WBC # BLD AUTO: 9.99 THOUSAND/UL (ref 4.31–10.16)
WBC #/AREA URNS AUTO: ABNORMAL /HPF

## 2020-09-17 PROCEDURE — 82550 ASSAY OF CK (CPK): CPT | Performed by: EMERGENCY MEDICINE

## 2020-09-17 PROCEDURE — 85025 COMPLETE CBC W/AUTO DIFF WBC: CPT | Performed by: PHYSICIAN ASSISTANT

## 2020-09-17 PROCEDURE — 71046 X-RAY EXAM CHEST 2 VIEWS: CPT

## 2020-09-17 PROCEDURE — 99284 EMERGENCY DEPT VISIT MOD MDM: CPT | Performed by: EMERGENCY MEDICINE

## 2020-09-17 PROCEDURE — 86140 C-REACTIVE PROTEIN: CPT | Performed by: PHYSICIAN ASSISTANT

## 2020-09-17 PROCEDURE — 87798 DETECT AGENT NOS DNA AMP: CPT | Performed by: EMERGENCY MEDICINE

## 2020-09-17 PROCEDURE — 81001 URINALYSIS AUTO W/SCOPE: CPT

## 2020-09-17 PROCEDURE — 85652 RBC SED RATE AUTOMATED: CPT | Performed by: PHYSICIAN ASSISTANT

## 2020-09-17 PROCEDURE — 80053 COMPREHEN METABOLIC PANEL: CPT | Performed by: PHYSICIAN ASSISTANT

## 2020-09-17 PROCEDURE — 82553 CREATINE MB FRACTION: CPT | Performed by: EMERGENCY MEDICINE

## 2020-09-17 PROCEDURE — 87801 DETECT AGNT MULT DNA AMPLI: CPT

## 2020-09-17 PROCEDURE — 99284 EMERGENCY DEPT VISIT MOD MDM: CPT

## 2020-09-17 PROCEDURE — 36415 COLL VENOUS BLD VENIPUNCTURE: CPT | Performed by: EMERGENCY MEDICINE

## 2020-09-17 PROCEDURE — 96361 HYDRATE IV INFUSION ADD-ON: CPT

## 2020-09-17 PROCEDURE — 86618 LYME DISEASE ANTIBODY: CPT | Performed by: PHYSICIAN ASSISTANT

## 2020-09-17 PROCEDURE — 96374 THER/PROPH/DIAG INJ IV PUSH: CPT

## 2020-09-17 RX ORDER — DOXYCYCLINE HYCLATE 100 MG/1
100 CAPSULE ORAL 2 TIMES DAILY
Qty: 14 CAPSULE | Refills: 0 | Status: SHIPPED | OUTPATIENT
Start: 2020-09-17 | End: 2020-09-25 | Stop reason: ALTCHOICE

## 2020-09-17 RX ORDER — KETOROLAC TROMETHAMINE 30 MG/ML
15 INJECTION, SOLUTION INTRAMUSCULAR; INTRAVENOUS ONCE
Status: COMPLETED | OUTPATIENT
Start: 2020-09-17 | End: 2020-09-17

## 2020-09-17 RX ADMIN — KETOROLAC TROMETHAMINE 15 MG: 30 INJECTION, SOLUTION INTRAMUSCULAR at 12:39

## 2020-09-17 RX ADMIN — LEVOFLOXACIN 750 MG: 500 TABLET, FILM COATED ORAL at 12:11

## 2020-09-17 RX ADMIN — SODIUM CHLORIDE 1000 ML: 0.9 INJECTION, SOLUTION INTRAVENOUS at 10:45

## 2020-09-17 NOTE — ED ATTENDING ATTESTATION
9/17/2020  I, Claudia Singh MD, saw and evaluated the patient  I have discussed the patient with the resident/non-physician practitioner and agree with the resident's/non-physician practitioner's findings, Plan of Care, and MDM as documented in the resident's/non-physician practitioner's note, except where noted  All available labs and Radiology studies were reviewed  I was present for key portions of any procedure(s) performed by the resident/non-physician practitioner and I was immediately available to provide assistance  At this point I agree with the current assessment done in the Emergency Department  I have conducted an independent evaluation of this patient a history and physical is as follows:    51-year-old male with history of prior hospitalization for pneumonia a couple of years ago, who presents for evaluation of fever up to 102 8° for the last 4 days  Two days prior to the onset of his fever the patient was stung approximately 10 times by yellow jackets  Two days later, he woke up with a fever, chills, diffuse body aches  Endorses a mild headache that waxes and wanes with his fever, as well as fatigue and generalized weakness  No sore throat, cough, shortness of breath, abdominal pain, nausea, vomiting, diarrhea, or dysuria  Denies tick bites or rashes however is outdoors a lot  On my exam the patient is afebrile and hemodynamically stable  Heart regular rate and rhythm, without murmurs, rubs, or gallops  Lungs clear to auscultation bilaterally without increased work of breathing  Patient is satting well on room air  Abdomen benign with positive bowel sounds  Oropharynx normal in appearance without tonsillar enlargement or exudates  No swelling noted to the joints of the extremities  No rashes  Differential diagnosis is broad  Doubt COVID as he tested negative 2 days ago  Will send tick-borne panel to rule out tick-borne illness    Labs without leukocytosis but ESR and CRP are elevated  CK obtained in the setting of myalgias; is elevated to 670 but with normal renal function  Trace protein in the urine, no myoglobinuria  Doubt rhabdo  IVFs given  Lab abnormalities could be consistent with serum sickness as the patient recently sustained numerous bee stings, however he has no additional skin findings on exam     Chest x-ray obtained given history of pneumonia and fever  This does show a right lower lobe pneumonia  Will cover with doxycycline to treat this as well as possible tick-borne illness  Patient is satting well on room air, has no increased work of breathing, is tolerating oral intake, no indication for admission at this time  Return precautions discussed for any difficulty breathing or vomiting with inability to tolerate oral intake  Case discussed with the patient's PCP, Dr Triny Soriano, by phone, who will follow-up with the patient       ED Course         Critical Care Time  Procedures

## 2020-09-17 NOTE — ED PROVIDER NOTES
History  Chief Complaint   Patient presents with    Fever - 9 weeks to 74 years     pt reports fever since monday ranging from 100-102  8  pt also reports being stung by 10 yellow jackets on friday  Overall body aches  The patient is a 42year old male with history of hypertension who presents the emergency department for evaluation of fever for the last 4 days  The patient states he was stung by yellow jackets approximately 10 times last Friday  He states he felt fine over the weekend, but he woke up Monday morning with chills and body aches  He states he took his temperature at that time, and it was normal   States as the day progressed, he continued to feel worse, so he checked his temperature  He states that within over 102  States since then, will fever and on  Reports he has been taking Tylenol and Motrin, which the way, however will return  Additionally mild headache, backache, fatigue and generalized weakness  The patient his primary care doctor morning was advised come to the emergency for further workup possible serum  Patient states he was tested for COVID-19, and it was negative  He additionally reports that his urine has been very dark, so he has been trying to drink more water  The patient states that [place], and he is outdoors a lot  He denies any known tick bites or rashes, however states it is possible have sustained a tick bite without knowing  He last took Tylenol around 8:00 a m  He denies cough, chest pain, shortness of breath, abdominal pain, nausea, vomiting, diarrhea dysuria, hematuria dizziness or lightheadedness        History provided by:  Patient   used: No    Fever - 9 weeks to 74 years   Associated symptoms: headaches and myalgias    Associated symptoms: no chest pain, no chills, no cough, no diarrhea, no dysuria, no ear pain, no nausea, no rash, no sore throat and no vomiting        Prior to Admission Medications   Prescriptions Last Dose Informant Patient Reported? Taking?    Vyvanse 50 MG capsule 9/17/2020 at Unknown time  No Yes   Sig: Take 1 capsule (50 mg total) by mouth dailyMax Daily Amount: 50 mg   cephalexin (KEFLEX) 500 mg capsule 9/17/2020 at Unknown time  No Yes   Sig: Take 1 capsule (500 mg total) by mouth every 6 (six) hours for 10 days   escitalopram (LEXAPRO) 10 mg tablet 9/17/2020 at Unknown time  No Yes   Sig: Take 1 tablet (10 mg total) by mouth daily   fexofenadine (ALLEGRA) 180 MG tablet 9/17/2020 at Unknown time  Yes Yes   Sig: Take 180 mg by mouth as needed     gabapentin (NEURONTIN) 100 mg capsule Not Taking at Unknown time  No No   Sig: Take 3 capsules (300 mg total) by mouth daily at bedtime   Patient not taking: Reported on 9/17/2020   hydrocortisone (ANUSOL-HC) 25 mg suppository Past Month at Unknown time  No Yes   Sig: Insert 1 suppository (25 mg total) into the rectum 2 (two) times a day as needed for hemorrhoids   losartan (COZAAR) 100 MG tablet 9/17/2020 at Unknown time  No Yes   Sig: Take 1 tablet (100 mg total) by mouth daily   metFORMIN (GLUCOPHAGE-XR) 750 mg 24 hr tablet Not Taking at Unknown time  No No   Sig: Take 1 tablet daily in AM for 2 weeks and then increase to 2 tablets daily in AM   Patient not taking: Reported on 9/17/2020   metoprolol succinate (TOPROL-XL) 50 mg 24 hr tablet 9/17/2020 at Unknown time  No Yes   Sig: Take 1 tablet (50 mg total) by mouth daily      Facility-Administered Medications: None       Past Medical History:   Diagnosis Date    ADHD (attention deficit hyperactivity disorder)     Hypersomnolence     Hypertension     Hypoxia     Infrapatellar bursitis of right knee     Mycobacterial infectious disease     JAMIE (obstructive sleep apnea)        Past Surgical History:   Procedure Laterality Date    CARPAL TUNNEL RELEASE Bilateral     TONSILLECTOMY         Family History   Problem Relation Age of Onset    Hypertension Mother     Diabetes Mother     Heart attack Father    [place] Diabetes Father     Hypertension Father     Obesity Father         hx LRYGB    Heart disease Father         hx MI age 45    Arthritis Family     Cancer Family     Cancer Paternal Grandmother         hx bladder cancer    Stroke Neg Hx     Thyroid disease Neg Hx      I have reviewed and agree with the history as documented  E-Cigarette/Vaping     E-Cigarette/Vaping Substances     Social History     Tobacco Use    Smoking status: Never Smoker    Smokeless tobacco: Never Used   Substance Use Topics    Alcohol use: Yes     Frequency: Monthly or less     Drinks per session: 1 or 2     Binge frequency: Never     Comment: rare    Drug use: No       Review of Systems   Constitutional: Positive for fever  Negative for chills  HENT: Negative for ear pain and sore throat  Eyes: Negative for redness and visual disturbance  Respiratory: Negative for cough, shortness of breath and wheezing  Cardiovascular: Negative for chest pain  Gastrointestinal: Negative for abdominal pain, diarrhea, nausea and vomiting  Genitourinary: Negative for dysuria and hematuria  Musculoskeletal: Positive for back pain and myalgias  Negative for neck pain and neck stiffness  Skin: Negative for color change and rash  Neurological: Positive for weakness (Generalized) and headaches  Negative for dizziness and light-headedness  All other systems reviewed and are negative  Physical Exam  Physical Exam  Vitals signs and nursing note reviewed  Constitutional:       General: He is not in acute distress  Appearance: He is well-developed  He is not ill-appearing or toxic-appearing  HENT:      Head: Normocephalic and atraumatic  Eyes:      Pupils: Pupils are equal, round, and reactive to light  Neck:      Musculoskeletal: Normal range of motion and neck supple  Cardiovascular:      Rate and Rhythm: Normal rate and regular rhythm  Heart sounds: Normal heart sounds     Pulmonary:      Effort: Pulmonary effort is normal       Breath sounds: Normal breath sounds  Abdominal:      General: There is no distension  Palpations: Abdomen is soft  Tenderness: There is no abdominal tenderness  There is no right CVA tenderness, left CVA tenderness, guarding or rebound  Skin:     General: Skin is warm and dry  Neurological:      Mental Status: He is alert and oriented to person, place, and time           Vital Signs  ED Triage Vitals   Temperature Pulse Respirations Blood Pressure SpO2   09/17/20 1014 09/17/20 1014 09/17/20 1014 09/17/20 1014 09/17/20 1014   98 7 °F (37 1 °C) 75 16 125/64 95 %      Temp src Heart Rate Source Patient Position - Orthostatic VS BP Location FiO2 (%)   -- 09/17/20 1014 09/17/20 1014 09/17/20 1014 --    Monitor Lying Right arm       Pain Score       09/17/20 1239       Med Not Given for Pain - for MAR use only           Vitals:    09/17/20 1014 09/17/20 1241   BP: 125/64 136/62   Pulse: 75 77   Patient Position - Orthostatic VS: Lying Lying         Visual Acuity      ED Medications  Medications   sodium chloride 0 9 % bolus 1,000 mL (0 mL Intravenous Stopped 9/17/20 1440)   levofloxacin (LEVAQUIN) tablet 750 mg (750 mg Oral Given 9/17/20 1211)   ketorolac (TORADOL) injection 15 mg (15 mg Intravenous Given 9/17/20 1239)       Diagnostic Studies  Results Reviewed     Procedure Component Value Units Date/Time    CKMB [[id number]]  (Normal) Collected:  09/17/20 1044    Lab Status:  Final result Specimen:  Blood from Arm, Right Updated:  09/17/20 1425     CK-MB Index <1 0 %      CK-MB 3 8 ng/mL     CK (with reflex to MB) [[id number]]  (Abnormal) Collected:  09/17/20 1044    Lab Status:  Final result Specimen:  Blood from Arm, Right Updated:  09/17/20 1351     Total  U/L     Urine Microscopic [[id number]]  (Abnormal) Collected:  09/17/20 1244    Lab Status:  Final result Specimen:  Urine Updated:  09/17/20 1337     RBC, UA 0-1 /hpf      WBC, UA 0-1 /hpf      Epithelial Cells Occasional /hpf      Bacteria, UA Occasional /hpf     C-reactive protein [[id number]]  (Abnormal) Collected:  09/17/20 1044    Lab Status:  Final result Specimen:  Blood from Arm, Right Updated:  09/17/20 1314      2 mg/L     Urine Macroscopic, POC [[id number]]  (Abnormal) Collected:  09/17/20 1244    Lab Status:  Final result Specimen:  Urine Updated:  09/17/20 1245     Color, UA Yellow     Clarity, UA Clear     pH, UA 6 0     Leukocytes, UA Negative     Nitrite, UA Negative     Protein, UA Trace mg/dl      Glucose, UA Negative mg/dl      Ketones, UA Negative mg/dl      Urobilinogen, UA 0 2 E U /dl      Bilirubin, UA Negative     Blood, UA Negative     Specific Gravity, UA 1 020    Narrative:       CLINITEK RESULT    Comprehensive metabolic panel [[id number]]  (Abnormal) Collected:  09/17/20 1044    Lab Status:  Final result Specimen:  Blood from Arm, Right Updated:  09/17/20 1156     Sodium 134 mmol/L      Potassium 3 9 mmol/L      Chloride 100 mmol/L      CO2 26 mmol/L      ANION GAP 8 mmol/L      BUN 12 mg/dL      Creatinine 0 87 mg/dL      Glucose 116 mg/dL      Calcium 8 2 mg/dL      Corrected Calcium 8 9 mg/dL      AST 30 U/L      ALT 42 U/L      Alkaline Phosphatase 69 U/L      Total Protein 7 6 g/dL      Albumin 3 1 g/dL      Total Bilirubin 0 48 mg/dL      eGFR 110 ml/min/1 73sq m     Narrative:       [place] guidelines for Chronic Kidney Disease (CKD):     Stage 1 with normal or high GFR (GFR > 90 mL/min/1 73 square meters)    Stage 2 Mild CKD (GFR = 60-89 mL/min/1 73 square meters)    Stage 3A Moderate CKD (GFR = 45-59 mL/min/1 73 square meters)    Stage 3B Moderate CKD (GFR = 30-44 mL/min/1 73 square meters)    Stage 4 Severe CKD (GFR = 15-29 mL/min/1 73 square meters)    Stage 5 End Stage CKD (GFR <15 mL/min/1 73 square meters)  Note: GFR calculation is accurate only with a steady state creatinine    [facility]ne panel - Miscellaneous Test [[id number]] Collected:  09/17/20 8999 Lab Status: In process Specimen:  Blood Updated:  09/17/20 1138    Sedimentation rate, automated [[id number]]  (Abnormal) Collected:  09/17/20 1049    Lab Status:  Final result Specimen:  Blood Updated:  09/17/20 1112     Sed Rate 50 mm/hour     Narrative:       New method- Test performed using  automated Rheology Technology  If following a patient's inflammatory disease during treatment, a new baseline should be established  CBC and differential [[id number]]  (Abnormal) Collected:  09/17/20 1044    Lab Status:  Final result Specimen:  Blood from Arm, Right Updated:  09/17/20 1055     WBC 9 99 Thousand/uL      RBC 4 76 Million/uL      Hemoglobin 13 7 g/dL      Hematocrit 41 6 %      MCV 87 fL      MCH 28 8 pg      MCHC 32 9 g/dL      RDW 12 7 %      MPV 9 6 fL      Platelets 427 Thousands/uL      nRBC 0 /100 WBCs      Neutrophils Relative 63 %      Immat GRANS % 1 %      Lymphocytes Relative 18 %      Monocytes Relative 16 %      Eosinophils Relative 1 %      Basophils Relative 1 %      Neutrophils Absolute 6 40 Thousands/µL      Immature Grans Absolute 0 05 Thousand/uL      Lymphocytes Absolute 1 84 Thousands/µL      Monocytes Absolute 1 55 Thousand/µL      Eosinophils Absolute 0 10 Thousand/µL      Basophils Absolute 0 05 Thousands/µL     Lyme Antibody Profile with reflex to [facility] [[id number]] Collected:  09/17/20 1044    Lab Status: In process Specimen:  Blood from Arm, Right Updated:  09/17/20 1048                 XR chest 2 views   Final Result by Isaak Sawyer MD (09/17 1147)      Right lower lobe pneumonia  The study was marked in [facility] for immediate notification  Workstation performed: [id number]                    Procedures  Procedures         ED Course  ED Course as of Sep 17 1519   Thu Sep 17, 2020   1158 Patient with RLL PNA  Will order Levaquin  Patient has previous hospitalization due to PNA with sepsis  No indication of sepsis today         1224 Patient is requesting something for pain and fever  Toradol ordered  Patient then be discharged with remaining prescription course for Levaquin       1301 UA reviewed and unremarkable  Pending CK results  MDM  Number of Diagnoses or Management Options  Pneumonia: new and requires workup  Diagnosis management comments: Patient presents for evaluation of fever for the last 4 days well as generalized weakness and myalgias  Of note, the patient was call ahead by his primary care doctor  Primary care doctor was concerned for possible serum sickness  Patient's vital signs are within normal limits in the ED  He is reporting that he had a fever 101 8 this morning  Differential includes but is not limited to UTI versus serum sickness versus pneumonia versus other viral syndrome versus tick-borne illness  Case was discussed with Dr Julie Goldmann who agreed to evaluate the patient as well  Labs, UA and chest x-ray ordered  Labs reviewed  Patient noted to have slightly elevated ESR and CRP  Patient's WBC count is within normal limits  Chest x-ray shows right lower lobe pneumonia  Results were discussed with the patient  Dr Julie Goldmann spoke with the patient's primary care doctor who requested a CK level as well  This was reviewed is slightly elevated as well  The patient was initially given a dose of Levaquin in the ED, however we ultimately decided to proceed with a course of doxycycline to cover both pneumonia and possible tick-borne illness  Tick-borne illness results were still pending  The patient was advised he will get a call with any positive results  Patient was given strict return to ED precautions for any significantly worsening symptoms or if fever does not resolve within 48 hours  He is additionally advised to have close follow-up with both his primary care doctor or pulmonologist   He acknowledged understanding  Patient is stable for discharge           Amount and/or Complexity of Data Reviewed  Clinical lab tests: ordered and reviewed  Tests in the radiology section of CPT®: ordered and reviewed  Decide to obtain previous medical records or to obtain history from someone other than the patient: yes  Review and summarize past medical records: yes  Discuss the patient with other providers: yes    Risk of Complications, Morbidity, and/or Mortality  Presenting problems: low  Diagnostic procedures: low  Management options: low    Patient Progress  Patient progress: stable      Disposition  Final diagnoses:   Pneumonia     Time reflects when diagnosis was documented in both MDM as applicable and the Disposition within this note     Time User Action Codes Description Comment    9/17/2020  2:32 PM Izabella Agent Add [J18 9] Pneumonia       ED Disposition     ED Disposition Condition Date/Time Comment    Discharge Stable Thu Sep 17, 2020  2:32 PM Luz Ruano discharge to home/self care              Follow-up Information     Follow up With Specialties Details Why Contact Info Additional Information    Bk Joyce,  Family Medicine Schedule an appointment as soon as possible for a visit in 2 days  [address] [id number]       [address] Emergency Department Emergency Medicine  If symptoms worsen [address] [facility]  AN ED, [address], [place], [place], [address]          Discharge Medication List as of 9/17/2020  2:33 PM      START taking these medications    Details   doxycycline hyclate (VIBRAMYCIN) 100 mg capsule Take 1 capsule (100 mg total) by mouth 2 (two) times a day for 7 days, Starting Thu 9/17/2020, Until Thu 9/24/2020, Normal         CONTINUE these medications which have NOT CHANGED    Details   cephalexin (KEFLEX) 500 mg capsule Take 1 capsule (500 mg total) by mouth every 6 (six) hours for 10 days, Starting Tue 9/15/2020, Until Fri 9/25/2020, Normal escitalopram (LEXAPRO) 10 mg tablet Take 1 tablet (10 mg total) by mouth daily, Starting Thu 7/9/2020, Normal      fexofenadine (ALLEGRA) 180 MG tablet Take 180 mg by mouth as needed  , Historical Med      hydrocortisone (ANUSOL-HC) 25 mg suppository Insert 1 suppository (25 mg total) into the rectum 2 (two) times a day as needed for hemorrhoids, Starting Mon 3/16/2020, Normal      losartan (COZAAR) 100 MG tablet Take 1 tablet (100 mg total) by mouth daily, Starting Thu 7/9/2020, Normal      metoprolol succinate (TOPROL-XL) 50 mg 24 hr tablet Take 1 tablet (50 mg total) by mouth daily, Starting Thu 7/9/2020, Normal      Vyvanse 50 MG capsule Take 1 capsule (50 mg total) by mouth dailyMax Daily Amount: 50 mg, Starting Tue 9/8/2020, Normal      gabapentin (NEURONTIN) 100 mg capsule Take 3 capsules (300 mg total) by mouth daily at bedtime, Starting Fri 8/21/2020, Normal      metFORMIN (GLUCOPHAGE-XR) 750 mg 24 hr tablet Take 1 tablet daily in AM for 2 weeks and then increase to 2 tablets daily in AM, Normal           No discharge procedures on file      PDMP Review       Value Time User    PDMP Reviewed  Yes 9/8/2020  8:46 AM RAGHU Castillo          ED Provider  Electronically Signed by           Germán Cole PA-C  09/17/20 3830

## 2020-09-18 LAB — B BURGDOR IGG+IGM SER-ACNC: <0.91 ISR (ref 0–0.9)

## 2020-09-23 ENCOUNTER — TELEMEDICINE (OUTPATIENT)
Dept: FAMILY MEDICINE CLINIC | Facility: CLINIC | Age: 40
End: 2020-09-23
Payer: COMMERCIAL

## 2020-09-23 DIAGNOSIS — R50.9 FEVER, UNSPECIFIED FEVER CAUSE: ICD-10-CM

## 2020-09-23 DIAGNOSIS — T63.444D BEE STING, UNDETERMINED INTENT, SUBSEQUENT ENCOUNTER: ICD-10-CM

## 2020-09-23 DIAGNOSIS — M79.10 MYALGIA: ICD-10-CM

## 2020-09-23 DIAGNOSIS — J18.9 PNEUMONIA OF RIGHT LOWER LOBE DUE TO INFECTIOUS ORGANISM: Primary | ICD-10-CM

## 2020-09-23 PROCEDURE — 99213 OFFICE O/P EST LOW 20 MIN: CPT | Performed by: FAMILY MEDICINE

## 2020-09-23 NOTE — PROGRESS NOTES
Virtual Regular Visit      Assessment/Plan:    Problem List Items Addressed This Visit     None      Visit Diagnoses     Pneumonia of right lower lobe due to infectious organism    -  Primary    [place] is stable on exam - condition is resolving  He is completing the Doxycycline  Has f/u CT of the Chest pending for Dr Barrera Lima of Pulmonology  Bee sting, undetermined intent, subsequent encounter        Localized reaction resolved; no signs of serum sickness  Fever, unspecified fever cause        Related to [facility] pneumonia - resolved  Myalgia        As above  Reason for visit is   Chief Complaint   Patient presents with    Follow-up     PT is being seen for a f/u, he is back at work and feeling great no vitals    Virtual Regular Visit        Encounter provider Grover Valdivia DO    Provider located at [place] [phone]      Recent Visits  Date Type Provider Dept   09/17/20 Telephone Emily Osorio, [address] [place] [address]   [facility] visits within past 7 days and meeting all other requirements     Today's Visits  Date Type Provider Dept   09/23/20 Telemedicine Bk Joyce DO  [facility]   Showing today's visits and meeting all other requirements     Future Appointments  No visits were found meeting these conditions  Showing future appointments within next 150 days and meeting all other requirements        The patient was identified by name and date of birth  Ileana Mullins was informed that this is a telemedicine visit and that the visit is being conducted through [facility] and patient was informed that this is a secure, HIPAA-compliant platform  He agrees to proceed     My office door was closed  No one else was in the room  He acknowledged consent and understanding of privacy and security of the video platform   The patient has agreed to participate and understands they can discontinue the visit at any time  Patient is aware this is a billable service  It was my intent to perform this visit via video technology but the patient was not able to do a video connection so the visit was completed via audio telephone only  Subjective  Gina Trujillo is a 42 y o  male - [place] has an ER F/u  Fear for possible serum sickness last week  Went to ER with ongoing fatigue, etc   He felt weak  COVID-19 was negative; CRP very elevated, CK mildly up  Pneumonia was seen at the [facility] on chest xray  Levaquin started; was switched over to Doxycline - finishing now  Will have f/u Chest CT for Pulmonology soon           Past Medical History:   Diagnosis Date    ADHD (attention deficit hyperactivity disorder)     Hypersomnolence     Hypertension     Hypoxia     Infrapatellar bursitis of right knee     Mycobacterial infectious disease     JAMIE (obstructive sleep apnea)        Past Surgical History:   Procedure Laterality Date    CARPAL TUNNEL RELEASE Bilateral     TONSILLECTOMY         Current Outpatient Medications   Medication Sig Dispense Refill    cephalexin (KEFLEX) 500 mg capsule Take 1 capsule (500 mg total) by mouth every 6 (six) hours for 10 days 40 capsule 0    doxycycline hyclate (VIBRAMYCIN) 100 mg capsule Take 1 capsule (100 mg total) by mouth 2 (two) times a day for 7 days 14 capsule 0    escitalopram (LEXAPRO) 10 mg tablet Take 1 tablet (10 mg total) by mouth daily 90 tablet 0    fexofenadine (ALLEGRA) 180 MG tablet Take 180 mg by mouth as needed        gabapentin (NEURONTIN) 100 mg capsule Take 3 capsules (300 mg total) by mouth daily at bedtime (Patient not taking: Reported on 9/17/2020) 90 capsule 0    hydrocortisone (ANUSOL-HC) 25 mg suppository Insert 1 suppository (25 mg total) into the rectum 2 (two) times a day as needed for hemorrhoids 12 suppository 1    losartan (COZAAR) 100 MG tablet Take 1 tablet (100 mg total) by mouth daily 30 tablet 5    metFORMIN (GLUCOPHAGE-XR) 750 mg 24 hr tablet Take 1 tablet daily in AM for 2 weeks and then increase to 2 tablets daily in AM (Patient not taking: Reported on 9/17/2020) 90 tablet 0    metoprolol succinate (TOPROL-XL) 50 mg 24 hr tablet Take 1 tablet (50 mg total) by mouth daily 30 tablet 5    Vyvanse 50 MG capsule Take 1 capsule (50 mg total) by mouth dailyMax Daily Amount: 50 mg 30 capsule 0     No current facility-administered medications for this visit  Allergies   Allergen Reactions    Olmesartan-Amlodipine-Hctz Edema    Terazosin Edema       Review of Systems   Constitutional: Negative for activity change and fever  Respiratory: Negative for cough and shortness of breath  Musculoskeletal: Negative for myalgias  Neurological: Negative for headaches  Video Exam    There were no vitals filed for this visit  Physical Exam / telephone Assessment:  Pt sounds well, speaking in full sentences  Alyssa Hayes was seen today for follow-up and virtual regular visit  Diagnoses and all orders for this visit:    Pneumonia of right lower lobe due to infectious organism  Comments:  Alyssa Hayes is stable on exam - condition is resolving  He is completing the Doxycycline  Has f/u CT of the Chest pending for Dr Adria Culver of Pulmonology  Bee sting, undetermined intent, subsequent encounter  Comments:  Localized reaction resolved; no signs of serum sickness  Fever, unspecified fever cause  Comments:  Related to [facility] pneumonia - resolved  Myalgia  Comments:  As above  I spent 15 minutes with patient today in which greater than 50% of the time was spent in counseling/coordination of care regarding his ER findings  VIRTUAL VISIT DISCLAIMER    Adela Viramontes acknowledges that he has consented to an online visit or consultation   He understands that the online visit is based solely on information provided by him, and that, in the absence of a face-to-face physical evaluation by the physician, the diagnosis he receives is both limited and provisional in terms of accuracy and completeness  This is not intended to replace a full medical face-to-face evaluation by the physician  Nury Cartwright understands and accepts these terms

## 2020-09-24 DIAGNOSIS — J18.9 PNEUMONIA OF RIGHT LOWER LOBE DUE TO INFECTIOUS ORGANISM: Primary | ICD-10-CM

## 2020-09-25 ENCOUNTER — OFFICE VISIT (OUTPATIENT)
Dept: CARDIOLOGY CLINIC | Facility: CLINIC | Age: 40
End: 2020-09-25
Payer: COMMERCIAL

## 2020-09-25 ENCOUNTER — IMMUNIZATIONS (OUTPATIENT)
Dept: PULMONOLOGY | Facility: CLINIC | Age: 40
End: 2020-09-25
Payer: COMMERCIAL

## 2020-09-25 VITALS
HEIGHT: 73 IN | DIASTOLIC BLOOD PRESSURE: 62 MMHG | HEART RATE: 61 BPM | SYSTOLIC BLOOD PRESSURE: 100 MMHG | BODY MASS INDEX: 41.75 KG/M2 | TEMPERATURE: 97.7 F | WEIGHT: 315 LBS

## 2020-09-25 DIAGNOSIS — E66.01 MORBID OBESITY WITH BMI OF 40.0-44.9, ADULT (HCC): ICD-10-CM

## 2020-09-25 DIAGNOSIS — F90.0 ATTENTION DEFICIT HYPERACTIVITY DISORDER (ADHD), PREDOMINANTLY INATTENTIVE TYPE: ICD-10-CM

## 2020-09-25 DIAGNOSIS — G47.33 OSA (OBSTRUCTIVE SLEEP APNEA): ICD-10-CM

## 2020-09-25 DIAGNOSIS — I10 ESSENTIAL HYPERTENSION: Primary | ICD-10-CM

## 2020-09-25 DIAGNOSIS — Z23 NEED FOR INFLUENZA VACCINATION: Primary | ICD-10-CM

## 2020-09-25 DIAGNOSIS — R40.0 DAYTIME SOMNOLENCE: ICD-10-CM

## 2020-09-25 DIAGNOSIS — R53.82 CHRONIC FATIGUE: ICD-10-CM

## 2020-09-25 LAB — MISCELLANEOUS LAB TEST RESULT: NORMAL

## 2020-09-25 PROCEDURE — 90682 RIV4 VACC RECOMBINANT DNA IM: CPT

## 2020-09-25 PROCEDURE — 90471 IMMUNIZATION ADMIN: CPT

## 2020-09-25 PROCEDURE — 93000 ELECTROCARDIOGRAM COMPLETE: CPT | Performed by: INTERNAL MEDICINE

## 2020-09-25 PROCEDURE — 99204 OFFICE O/P NEW MOD 45 MIN: CPT | Performed by: INTERNAL MEDICINE

## 2020-09-25 NOTE — PROGRESS NOTES
Patient arrived for an annual flu injection  Flu injection was given in patient's right Deltoid, he tolerated the injection well  Information sheet on the influenza vaccine was given to patient

## 2020-09-25 NOTE — PROGRESS NOTES
[facility]  1980  [id number]  [facility] [address] [place] [address]  [phone]    1  Essential hypertension  POCT ECG    Echo complete with contrast if indicated   2  JAMIE (obstructive sleep apnea)  Echo complete with contrast if indicated   3  Attention deficit hyperactivity disorder (ADHD), predominantly inattentive type     4  Chronic fatigue     5  Morbid obesity with BMI of 40 0-44 9, adult ([address] )     6  Daytime somnolence         Discussion/Summary:  Patient's main complaint is chronic generalized fatigue  He has obstructive sleep apnea and has been on treatment faithfully but still does not feel well  I think there is a component from his chronic beta-blocker use  At 42years old I would try to get him off of the Toprol or at least decrease the dose significantly  I do not think it is having a great effect on his blood pressure for now decrease Toprol XL to 25 mg daily  Continue losartan 100 mg daily  He will call me in 2-3 weeks and let me know how he is feeling  I have ordered an echocardiogram to evaluate LV function, pulmonary artery pressures, right heart structure and function given the history of sleep apnea  He had an episode in the emergency room on the 17th where he had multiple bee stings inflammatory markers were quite high  I have recommended that he follow-up with his primary physician and have repeat CRP and sed rate in a few weeks to make sure these levels come back down  If there is no reduction is likely not from the bee stings and may have an underlying inflammatory process  I have encouraged him to continue with diet and exercise I will see him back in 3-4 months  Interval History:  65-year-old gentleman with a history of hypertension, obstructive sleep apnea, ADHD presents for new patient evaluation  Over the last several years he has had increasing generalized fatigue    He does feels like he has no energy  He underwent a sleep study which showed significant apnea and he has been on treatment faithfully since then  He does get good sleep during the evening but feels that he is not rested  He has been on treatment for hypertension as well blood pressures have been on the low side heart rates have been in the 60s  He denies any anginal sounding chest pain or discomfort  He does not get much in terms of dyspnea  There has been no palpitations, lightheadedness, dizziness, or syncope  Denies lower extremity edema, PND, orthopnea  Libido has been reduced      Problem List     Bilateral pneumonia    ADHD (attention deficit hyperactivity disorder)    Hypertension    Morbid obesity with BMI of 40 0-44 9, adult (HCC)    Sepsis ([facility] [address] )    Impaired fasting glucose    Asthma    JAMIE (obstructive sleep apnea)    Daytime somnolence    Hypersomnolence    Infrapatellar bursitis of right knee    Influenza        Past Medical History:   Diagnosis Date    ADHD (attention deficit hyperactivity disorder)     Hypersomnolence     Hypertension     Hypoxia     Infrapatellar bursitis of right knee     Mycobacterial infectious disease     JAMIE (obstructive sleep apnea)      Social History     Socioeconomic History    Marital status: /Civil Union     Spouse name: Not on file    Number of children: Not on file    Years of education: Not on file    Highest education level: Not on file   Occupational History    Not on file   Social Needs    Financial resource strain: Not on file    Food insecurity     Worry: Not on file     Inability: Not on file   [employer] needs     Medical: Not on file     Non-medical: Not on file   Tobacco Use    Smoking status: Never Smoker    Smokeless tobacco: Never Used   Substance and Sexual Activity    Alcohol use: Yes     Frequency: Monthly or less     Drinks per session: 1 or 2     Binge frequency: Never     Comment: rare    Drug use: No    Sexual activity: Not on file Lifestyle    Physical activity     Days per week: Not on file     Minutes per session: Not on file    Stress: Not on file   Relationships    Social connections     Talks on phone: Not on file     Gets together: Not on file     Attends Rastafari service: Not on file     Active member of club or organization: Not on file     Attends meetings of clubs or organizations: Not on file     Relationship status: Not on file    Intimate partner violence     Fear of current or ex partner: Not on file     Emotionally abused: Not on file     Physically abused: Not on file     Forced sexual activity: Not on file   Other Topics Concern    Not on file   Social History Narrative    Not on file      Family History   Problem Relation Age of Onset    Hypertension Mother     Diabetes Mother     Heart attack Father     Diabetes Father     Hypertension Father     Obesity Father         hx LRYGB    Heart disease Father         hx MI age 36   [facility] Arthritis Family     Cancer Family     Cancer Paternal Grandmother         hx bladder cancer    Heart attack Paternal Grandfather     Stroke Neg Hx     Thyroid disease Neg Hx      Past Surgical History:   Procedure Laterality Date    CARPAL TUNNEL RELEASE Bilateral     TONSILLECTOMY         Current Outpatient Medications:     escitalopram (LEXAPRO) 10 mg tablet, Take 1 tablet (10 mg total) by mouth daily, Disp: 90 tablet, Rfl: 0    fexofenadine (ALLEGRA) 180 MG tablet, Take 180 mg by mouth as needed  , Disp: , Rfl:     losartan (COZAAR) 100 MG tablet, Take 1 tablet (100 mg total) by mouth daily, Disp: 30 tablet, Rfl: 5    metoprolol succinate (TOPROL-XL) 50 mg 24 hr tablet, Take 1 tablet (50 mg total) by mouth daily, Disp: 30 tablet, Rfl: 5    Vyvanse 50 MG capsule, Take 1 capsule (50 mg total) by mouth dailyMax Daily Amount: 50 mg, Disp: 30 capsule, Rfl: 0    cephalexin (KEFLEX) 500 mg capsule, Take 1 capsule (500 mg total) by mouth every 6 (six) hours for 10 days (Patient not taking: Reported on 9/25/2020), Disp: 40 capsule, Rfl: 0    gabapentin (NEURONTIN) 100 mg capsule, Take 3 capsules (300 mg total) by mouth daily at bedtime (Patient not taking: Reported on 9/17/2020), Disp: 90 capsule, Rfl: 0    hydrocortisone (ANUSOL-HC) 25 mg suppository, Insert 1 suppository (25 mg total) into the rectum 2 (two) times a day as needed for hemorrhoids, Disp: 12 suppository, Rfl: 1    metFORMIN (GLUCOPHAGE-XR) 750 mg 24 hr tablet, Take 1 tablet daily in AM for 2 weeks and then increase to 2 tablets daily in AM (Patient not taking: Reported on 9/17/2020), Disp: 90 tablet, Rfl: 0  Allergies   Allergen Reactions    Olmesartan-Amlodipine-Hctz Edema    Terazosin Edema       Labs:     Chemistry        Component Value Date/Time    K 3 9 09/17/2020 1044    K 4 4 09/12/2019 0722     09/17/2020 1044     09/12/2019 0722    CO2 26 09/17/2020 1044    CO2 24 09/12/2019 0722    BUN 12 09/17/2020 1044    BUN 17 09/12/2019 0722    CREATININE 0 87 09/17/2020 1044        Component Value Date/Time    CALCIUM 8 2 (L) 09/17/2020 1044    ALKPHOS 69 09/17/2020 1044    AST 30 09/17/2020 1044    ALT 42 09/17/2020 1044            No results found for: CHOL  Lab Results   Component Value Date    HDL 30 (L) 07/07/2020     Lab Results   Component Value Date    LDLCALC 77 07/07/2020     Lab Results   Component Value Date    TRIG 154 (H) 07/07/2020     No results found for: CHOLHDL    Imaging: Xr Chest 2 Views    Result Date: 9/17/2020  Narrative: CHEST INDICATION:   history of pneumonia, fever, generalized weakness  COMPARISON:  Chest radiograph from 1/15/2020  EXAM PERFORMED/VIEWS:  XR CHEST PA & LATERAL FINDINGS: Cardiomediastinal silhouette appears unremarkable  Consolidation in the right lower lobe  No effusion or pneumothorax  Osseous structures appear within normal limits for patient age  Impression: Right lower lobe pneumonia  The study was marked in [facility] for immediate notification   Workstation performed: [id number]       ECG:  Sinus rhythm      Review of Systems   Constitution: Positive for malaise/fatigue  HENT: Negative  Eyes: Negative  Cardiovascular: Negative  Respiratory: Negative  Endocrine: Negative  Hematologic/Lymphatic: Negative  Skin: Negative  Musculoskeletal: Negative  Gastrointestinal: Negative  Genitourinary: Negative  Neurological: Negative  Psychiatric/Behavioral: Negative  All other systems reviewed and are negative  Vitals:    09/25/20 1556   BP: 100/62   Pulse: 61   Temp: 97 7 °F (36 5 °C)     Vitals:    09/25/20 1556   Weight: (!) 146 kg (321 lb 8 oz)     Height: 6' 1" (185 4 cm)   Body mass index is 42 42 kg/m²  Physical Exam:  Vital signs reviewed  General appearance:  Appears stated age, alert, well appearing and in no distress  HEENT:  PERRLA, EOMI, no scleral icterus, no conjunctival pallor  NECK:  Supple, No elevated JVP, no thyromegaly, no carotid bruits, no JVD  HEART:  Regular rate and rhythm, normal S1/S2, no S3/S4, no murmur or rub, PMI nondisplaced  LUNGS:  Clear to auscultation bilaterally, no wheezes rales or rhonchi  ABDOMEN:  Soft, non-tender, positive bowel sounds, no rebound or guarding, no organomegaly   EXTREMITIES:  Normal range of motion  No clubbing or cyanosis   No edema  VASCULAR:  Normal pedal pulses   SKIN: No lesions or rashes on exposed skin  NEURO:  CN II-XII intact, no focal deficits

## 2020-10-06 ENCOUNTER — APPOINTMENT (EMERGENCY)
Dept: RADIOLOGY | Facility: HOSPITAL | Age: 40
End: 2020-10-06
Payer: COMMERCIAL

## 2020-10-06 ENCOUNTER — HOSPITAL ENCOUNTER (EMERGENCY)
Facility: HOSPITAL | Age: 40
Discharge: HOME | End: 2020-10-07
Attending: EMERGENCY MEDICINE | Admitting: EMERGENCY MEDICINE
Payer: COMMERCIAL

## 2020-10-06 ENCOUNTER — APPOINTMENT (EMERGENCY)
Dept: CT IMAGING | Facility: HOSPITAL | Age: 40
End: 2020-10-06
Payer: COMMERCIAL

## 2020-10-06 ENCOUNTER — HOSPITAL ENCOUNTER (OUTPATIENT)
Dept: RADIOLOGY | Facility: HOSPITAL | Age: 40
Discharge: HOME | End: 2020-10-06
Attending: INTERNAL MEDICINE
Payer: COMMERCIAL

## 2020-10-06 ENCOUNTER — TRANSCRIBE ORDERS (OUTPATIENT)
Dept: ADMINISTRATIVE | Facility: HOSPITAL | Age: 40
End: 2020-10-06

## 2020-10-06 DIAGNOSIS — J20.8 VIRAL BRONCHITIS: ICD-10-CM

## 2020-10-06 DIAGNOSIS — J18.9 PNEUMONIA: ICD-10-CM

## 2020-10-06 DIAGNOSIS — J20.8 VIRAL BRONCHITIS: Primary | ICD-10-CM

## 2020-10-06 DIAGNOSIS — R50.9 FEVER: Primary | ICD-10-CM

## 2020-10-06 DIAGNOSIS — J18.9 PNEUMONIA OF RIGHT LOWER LOBE DUE TO INFECTIOUS ORGANISM: ICD-10-CM

## 2020-10-06 LAB
ALBUMIN SERPL BCP-MCNC: 3.7 G/DL (ref 3.5–5)
ALP SERPL-CCNC: 87 U/L (ref 46–116)
ALT SERPL W P-5'-P-CCNC: 52 U/L (ref 12–78)
ANION GAP SERPL CALCULATED.3IONS-SCNC: 9 MMOL/L (ref 4–13)
APTT PPP: 42 SECONDS (ref 23–37)
AST SERPL W P-5'-P-CCNC: 36 U/L (ref 5–45)
BASOPHILS # BLD AUTO: 0.05 THOUSANDS/ΜL (ref 0–0.1)
BASOPHILS NFR BLD AUTO: 0 % (ref 0–1)
BILIRUB SERPL-MCNC: 0.63 MG/DL (ref 0.2–1)
BILIRUB UR QL STRIP: NEGATIVE
BUN SERPL-MCNC: 15 MG/DL (ref 5–25)
CALCIUM SERPL-MCNC: 8.7 MG/DL (ref 8.3–10.1)
CHLORIDE SERPL-SCNC: 100 MMOL/L (ref 100–108)
CLARITY UR: CLEAR
CO2 SERPL-SCNC: 27 MMOL/L (ref 21–32)
COLOR UR: YELLOW
CREAT SERPL-MCNC: 1.09 MG/DL (ref 0.6–1.3)
EOSINOPHIL # BLD AUTO: 0.06 THOUSAND/ΜL (ref 0–0.61)
EOSINOPHIL NFR BLD AUTO: 0 % (ref 0–6)
ERYTHROCYTE [DISTWIDTH] IN BLOOD BY AUTOMATED COUNT: 13.3 % (ref 11.6–15.1)
GFR SERPL CREATININE-BSD FRML MDRD: 86 ML/MIN/1.73SQ M
GLUCOSE SERPL-MCNC: 98 MG/DL (ref 65–140)
GLUCOSE UR STRIP-MCNC: NEGATIVE MG/DL
HCT VFR BLD AUTO: 44.5 % (ref 36.5–49.3)
HGB BLD-MCNC: 14.4 G/DL (ref 12–17)
HGB UR QL STRIP.AUTO: NEGATIVE
IMM GRANULOCYTES # BLD AUTO: 0.12 THOUSAND/UL (ref 0–0.2)
IMM GRANULOCYTES NFR BLD AUTO: 1 % (ref 0–2)
INR PPP: 1 (ref 0.84–1.19)
KETONES UR STRIP-MCNC: NEGATIVE MG/DL
LACTATE SERPL-SCNC: 1.4 MMOL/L (ref 0.5–2)
LEUKOCYTE ESTERASE UR QL STRIP: NEGATIVE
LYMPHOCYTES # BLD AUTO: 1.43 THOUSANDS/ΜL (ref 0.6–4.47)
LYMPHOCYTES NFR BLD AUTO: 9 % (ref 14–44)
MCH RBC QN AUTO: 27.6 PG (ref 26.8–34.3)
MCHC RBC AUTO-ENTMCNC: 32.4 G/DL (ref 31.4–37.4)
MCV RBC AUTO: 85 FL (ref 82–98)
MONOCYTES # BLD AUTO: 1.62 THOUSAND/ΜL (ref 0.17–1.22)
MONOCYTES NFR BLD AUTO: 10 % (ref 4–12)
NEUTROPHILS # BLD AUTO: 13.13 THOUSANDS/ΜL (ref 1.85–7.62)
NEUTS SEG NFR BLD AUTO: 80 % (ref 43–75)
NITRITE UR QL STRIP: NEGATIVE
NRBC BLD AUTO-RTO: 0 /100 WBCS
PH UR STRIP.AUTO: 6 [PH]
PLATELET # BLD AUTO: 243 THOUSANDS/UL (ref 149–390)
PMV BLD AUTO: 9.4 FL (ref 8.9–12.7)
POTASSIUM SERPL-SCNC: 3.5 MMOL/L (ref 3.5–5.3)
PROT SERPL-MCNC: 7.7 G/DL (ref 6.4–8.2)
PROT UR STRIP-MCNC: NEGATIVE MG/DL
PROTHROMBIN TIME: 13.3 SECONDS (ref 11.6–14.5)
RBC # BLD AUTO: 5.22 MILLION/UL (ref 3.88–5.62)
SODIUM SERPL-SCNC: 136 MMOL/L (ref 136–145)
SP GR UR STRIP.AUTO: 1.01 (ref 1–1.03)
UROBILINOGEN UR QL STRIP.AUTO: 0.2 E.U./DL
WBC # BLD AUTO: 16.41 THOUSAND/UL (ref 4.31–10.16)

## 2020-10-06 PROCEDURE — 87040 BLOOD CULTURE FOR BACTERIA: CPT | Performed by: EMERGENCY MEDICINE

## 2020-10-06 PROCEDURE — 36415 COLL VENOUS BLD VENIPUNCTURE: CPT

## 2020-10-06 PROCEDURE — 96360 HYDRATION IV INFUSION INIT: CPT

## 2020-10-06 PROCEDURE — G1004 CDSM NDSC: HCPCS

## 2020-10-06 PROCEDURE — 71046 X-RAY EXAM CHEST 2 VIEWS: CPT

## 2020-10-06 PROCEDURE — 71275 CT ANGIOGRAPHY CHEST: CPT

## 2020-10-06 PROCEDURE — 85730 THROMBOPLASTIN TIME PARTIAL: CPT | Performed by: EMERGENCY MEDICINE

## 2020-10-06 PROCEDURE — 99284 EMERGENCY DEPT VISIT MOD MDM: CPT | Performed by: EMERGENCY MEDICINE

## 2020-10-06 PROCEDURE — 85025 COMPLETE CBC W/AUTO DIFF WBC: CPT | Performed by: EMERGENCY MEDICINE

## 2020-10-06 PROCEDURE — 93005 ELECTROCARDIOGRAM TRACING: CPT

## 2020-10-06 PROCEDURE — 87077 CULTURE AEROBIC IDENTIFY: CPT | Performed by: EMERGENCY MEDICINE

## 2020-10-06 PROCEDURE — 80053 COMPREHEN METABOLIC PANEL: CPT | Performed by: EMERGENCY MEDICINE

## 2020-10-06 PROCEDURE — U0003 INFECTIOUS AGENT DETECTION BY NUCLEIC ACID (DNA OR RNA); SEVERE ACUTE RESPIRATORY SYNDROME CORONAVIRUS 2 (SARS-COV-2) (CORONAVIRUS DISEASE [COVID-19]), AMPLIFIED PROBE TECHNIQUE, MAKING USE OF HIGH THROUGHPUT TECHNOLOGIES AS DESCRIBED BY CMS-2020-01-R: HCPCS | Performed by: INTERNAL MEDICINE

## 2020-10-06 PROCEDURE — 85610 PROTHROMBIN TIME: CPT | Performed by: EMERGENCY MEDICINE

## 2020-10-06 PROCEDURE — 83605 ASSAY OF LACTIC ACID: CPT | Performed by: EMERGENCY MEDICINE

## 2020-10-06 PROCEDURE — 81003 URINALYSIS AUTO W/O SCOPE: CPT | Performed by: EMERGENCY MEDICINE

## 2020-10-06 PROCEDURE — 84145 PROCALCITONIN (PCT): CPT | Performed by: EMERGENCY MEDICINE

## 2020-10-06 PROCEDURE — 99284 EMERGENCY DEPT VISIT MOD MDM: CPT

## 2020-10-06 RX ORDER — IBUPROFEN 400 MG/1
800 TABLET ORAL ONCE
Status: COMPLETED | OUTPATIENT
Start: 2020-10-06 | End: 2020-10-06

## 2020-10-06 RX ORDER — ACETAMINOPHEN 325 MG/1
975 TABLET ORAL ONCE
Status: COMPLETED | OUTPATIENT
Start: 2020-10-06 | End: 2020-10-06

## 2020-10-06 RX ADMIN — ACETAMINOPHEN 975 MG: 325 TABLET, FILM COATED ORAL at 23:13

## 2020-10-06 RX ADMIN — IBUPROFEN 800 MG: 400 TABLET ORAL at 23:13

## 2020-10-06 RX ADMIN — SODIUM CHLORIDE 1000 ML: 0.9 INJECTION, SOLUTION INTRAVENOUS at 23:20

## 2020-10-06 RX ADMIN — IOHEXOL 100 ML: 350 INJECTION, SOLUTION INTRAVENOUS at 23:54

## 2020-10-07 ENCOUNTER — HOSPITAL ENCOUNTER (INPATIENT)
Facility: HOSPITAL | Age: 40
LOS: 2 days | Discharge: HOME | DRG: 871 | End: 2020-10-09
Attending: EMERGENCY MEDICINE | Admitting: INTERNAL MEDICINE
Payer: COMMERCIAL

## 2020-10-07 ENCOUNTER — PREP FOR PROCEDURE (OUTPATIENT)
Dept: PULMONOLOGY | Facility: CLINIC | Age: 40
End: 2020-10-07

## 2020-10-07 ENCOUNTER — TELEMEDICINE (OUTPATIENT)
Dept: FAMILY MEDICINE CLINIC | Facility: CLINIC | Age: 40
End: 2020-10-07
Payer: COMMERCIAL

## 2020-10-07 VITALS
WEIGHT: 315 LBS | HEART RATE: 118 BPM | OXYGEN SATURATION: 93 % | RESPIRATION RATE: 18 BRPM | DIASTOLIC BLOOD PRESSURE: 46 MMHG | SYSTOLIC BLOOD PRESSURE: 91 MMHG | HEIGHT: 73 IN | TEMPERATURE: 99 F | BODY MASS INDEX: 41.75 KG/M2

## 2020-10-07 VITALS — TEMPERATURE: 98.5 F | WEIGHT: 315 LBS | BODY MASS INDEX: 42.48 KG/M2

## 2020-10-07 DIAGNOSIS — J18.9 RIGHT UPPER LOBE PNEUMONIA: Primary | ICD-10-CM

## 2020-10-07 DIAGNOSIS — R91.8 PULMONARY INFILTRATE: Primary | ICD-10-CM

## 2020-10-07 DIAGNOSIS — R78.81 POSITIVE BLOOD CULTURE: ICD-10-CM

## 2020-10-07 DIAGNOSIS — A41.9 SEPSIS (HCC): ICD-10-CM

## 2020-10-07 DIAGNOSIS — J18.9 COMMUNITY ACQUIRED PNEUMONIA OF RIGHT UPPER LOBE OF LUNG: Primary | ICD-10-CM

## 2020-10-07 PROBLEM — D72.829 LEUKOCYTOSIS: Status: ACTIVE | Noted: 2020-10-07

## 2020-10-07 LAB
ANION GAP SERPL CALCULATED.3IONS-SCNC: 7 MMOL/L (ref 4–13)
BACTERIA SPT RESP CULT: ABNORMAL
BASOPHILS # BLD AUTO: 0.06 THOUSANDS/ΜL (ref 0–0.1)
BASOPHILS NFR BLD AUTO: 0 % (ref 0–1)
BUN SERPL-MCNC: 21 MG/DL (ref 5–25)
CALCIUM SERPL-MCNC: 8.7 MG/DL (ref 8.3–10.1)
CHLORIDE SERPL-SCNC: 102 MMOL/L (ref 100–108)
CO2 SERPL-SCNC: 28 MMOL/L (ref 21–32)
CREAT SERPL-MCNC: 1.04 MG/DL (ref 0.6–1.3)
EOSINOPHIL # BLD AUTO: 0.07 THOUSAND/ΜL (ref 0–0.61)
EOSINOPHIL NFR BLD AUTO: 0 % (ref 0–6)
ERYTHROCYTE [DISTWIDTH] IN BLOOD BY AUTOMATED COUNT: 13.4 % (ref 11.6–15.1)
GFR SERPL CREATININE-BSD FRML MDRD: 91 ML/MIN/1.73SQ M
GLUCOSE SERPL-MCNC: 106 MG/DL (ref 65–140)
GRAM STN SPEC: ABNORMAL
HCT VFR BLD AUTO: 40.7 % (ref 36.5–49.3)
HGB BLD-MCNC: 13.5 G/DL (ref 12–17)
IMM GRANULOCYTES # BLD AUTO: 0.27 THOUSAND/UL (ref 0–0.2)
IMM GRANULOCYTES NFR BLD AUTO: 1 % (ref 0–2)
LACTATE SERPL-SCNC: 1.2 MMOL/L (ref 0.5–2)
LYMPHOCYTES # BLD AUTO: 3.27 THOUSANDS/ΜL (ref 0.6–4.47)
LYMPHOCYTES NFR BLD AUTO: 15 % (ref 14–44)
MCH RBC QN AUTO: 28.6 PG (ref 26.8–34.3)
MCHC RBC AUTO-ENTMCNC: 33.2 G/DL (ref 31.4–37.4)
MCV RBC AUTO: 86 FL (ref 82–98)
MONOCYTES # BLD AUTO: 2.37 THOUSAND/ΜL (ref 0.17–1.22)
MONOCYTES NFR BLD AUTO: 11 % (ref 4–12)
NEUTROPHILS # BLD AUTO: 16.38 THOUSANDS/ΜL (ref 1.85–7.62)
NEUTS SEG NFR BLD AUTO: 73 % (ref 43–75)
NRBC BLD AUTO-RTO: 0 /100 WBCS
PLATELET # BLD AUTO: 254 THOUSANDS/UL (ref 149–390)
PMV BLD AUTO: 10 FL (ref 8.9–12.7)
POTASSIUM SERPL-SCNC: 3.8 MMOL/L (ref 3.5–5.3)
PROCALCITONIN SERPL-MCNC: 1.65 NG/ML
RBC # BLD AUTO: 4.72 MILLION/UL (ref 3.88–5.62)
SARS-COV-2 RNA RESP QL NAA+PROBE: NEGATIVE
SODIUM SERPL-SCNC: 137 MMOL/L (ref 136–145)
WBC # BLD AUTO: 22.42 THOUSAND/UL (ref 4.31–10.16)

## 2020-10-07 PROCEDURE — 83605 ASSAY OF LACTIC ACID: CPT | Performed by: EMERGENCY MEDICINE

## 2020-10-07 PROCEDURE — 99213 OFFICE O/P EST LOW 20 MIN: CPT | Performed by: FAMILY MEDICINE

## 2020-10-07 PROCEDURE — 99284 EMERGENCY DEPT VISIT MOD MDM: CPT

## 2020-10-07 PROCEDURE — 99223 1ST HOSP IP/OBS HIGH 75: CPT | Performed by: PHYSICIAN ASSISTANT

## 2020-10-07 PROCEDURE — 96361 HYDRATE IV INFUSION ADD-ON: CPT

## 2020-10-07 PROCEDURE — 96365 THER/PROPH/DIAG IV INF INIT: CPT

## 2020-10-07 PROCEDURE — 87205 SMEAR GRAM STAIN: CPT | Performed by: EMERGENCY MEDICINE

## 2020-10-07 PROCEDURE — 96366 THER/PROPH/DIAG IV INF ADDON: CPT

## 2020-10-07 PROCEDURE — 87040 BLOOD CULTURE FOR BACTERIA: CPT | Performed by: EMERGENCY MEDICINE

## 2020-10-07 PROCEDURE — 87633 RESP VIRUS 12-25 TARGETS: CPT | Performed by: PHYSICIAN ASSISTANT

## 2020-10-07 PROCEDURE — 87798 DETECT AGENT NOS DNA AMP: CPT | Performed by: PHYSICIAN ASSISTANT

## 2020-10-07 PROCEDURE — 87635 SARS-COV-2 COVID-19 AMP PRB: CPT | Performed by: EMERGENCY MEDICINE

## 2020-10-07 PROCEDURE — 87486 CHLMYD PNEUM DNA AMP PROBE: CPT | Performed by: PHYSICIAN ASSISTANT

## 2020-10-07 PROCEDURE — 85025 COMPLETE CBC W/AUTO DIFF WBC: CPT | Performed by: EMERGENCY MEDICINE

## 2020-10-07 PROCEDURE — 36415 COLL VENOUS BLD VENIPUNCTURE: CPT | Performed by: EMERGENCY MEDICINE

## 2020-10-07 PROCEDURE — 80048 BASIC METABOLIC PNL TOTAL CA: CPT | Performed by: EMERGENCY MEDICINE

## 2020-10-07 PROCEDURE — 99284 EMERGENCY DEPT VISIT MOD MDM: CPT | Performed by: EMERGENCY MEDICINE

## 2020-10-07 PROCEDURE — 87581 M.PNEUMON DNA AMP PROBE: CPT | Performed by: PHYSICIAN ASSISTANT

## 2020-10-07 RX ORDER — CEFDINIR 300 MG/1
300 CAPSULE ORAL EVERY 12 HOURS SCHEDULED
Qty: 20 CAPSULE | Refills: 0 | Status: SHIPPED | OUTPATIENT
Start: 2020-10-07 | End: 2020-10-09 | Stop reason: HOSPADM

## 2020-10-07 RX ORDER — SODIUM CHLORIDE 9 MG/ML
125 INJECTION, SOLUTION INTRAVENOUS CONTINUOUS
Status: DISCONTINUED | OUTPATIENT
Start: 2020-10-07 | End: 2020-10-08

## 2020-10-07 RX ADMIN — CEFTRIAXONE SODIUM 1000 MG: 10 INJECTION, POWDER, FOR SOLUTION INTRAVENOUS at 00:34

## 2020-10-07 RX ADMIN — SODIUM CHLORIDE 1000 ML: 0.9 INJECTION, SOLUTION INTRAVENOUS at 21:19

## 2020-10-07 RX ADMIN — CEFTRIAXONE SODIUM 1000 MG: 10 INJECTION, POWDER, FOR SOLUTION INTRAVENOUS at 21:19

## 2020-10-08 ENCOUNTER — ANESTHESIA EVENT (INPATIENT)
Dept: GASTROENTEROLOGY | Facility: HOSPITAL | Age: 40
DRG: 871 | End: 2020-10-08
Payer: COMMERCIAL

## 2020-10-08 ENCOUNTER — ANESTHESIA (INPATIENT)
Dept: GASTROENTEROLOGY | Facility: HOSPITAL | Age: 40
DRG: 871 | End: 2020-10-08
Payer: COMMERCIAL

## 2020-10-08 ENCOUNTER — APPOINTMENT (INPATIENT)
Dept: GASTROENTEROLOGY | Facility: HOSPITAL | Age: 40
DRG: 871 | End: 2020-10-08
Payer: COMMERCIAL

## 2020-10-08 ENCOUNTER — APPOINTMENT (INPATIENT)
Dept: NON INVASIVE DIAGNOSTICS | Facility: HOSPITAL | Age: 40
DRG: 871 | End: 2020-10-08
Payer: COMMERCIAL

## 2020-10-08 VITALS — HEART RATE: 73 BPM

## 2020-10-08 LAB
ANION GAP SERPL CALCULATED.3IONS-SCNC: 7 MMOL/L (ref 4–13)
B PARAP IS1001 DNA NPH QL NAA+NON-PROBE: NOT DETECTED
B PERT.PT PRMT NPH QL NAA+NON-PROBE: NOT DETECTED
BASOPHILS # BLD AUTO: 0.07 THOUSANDS/ΜL (ref 0–0.1)
BASOPHILS NFR BLD AUTO: 0 % (ref 0–1)
BUN SERPL-MCNC: 19 MG/DL (ref 5–25)
C PNEUM DNA NPH QL NAA+NON-PROBE: NOT DETECTED
CALCIUM SERPL-MCNC: 8.3 MG/DL (ref 8.3–10.1)
CHLORIDE SERPL-SCNC: 105 MMOL/L (ref 100–108)
CO2 SERPL-SCNC: 25 MMOL/L (ref 21–32)
CREAT SERPL-MCNC: 0.86 MG/DL (ref 0.6–1.3)
EOSINOPHIL # BLD AUTO: 0.26 THOUSAND/ΜL (ref 0–0.61)
EOSINOPHIL NFR BLD AUTO: 1 % (ref 0–6)
ERYTHROCYTE [DISTWIDTH] IN BLOOD BY AUTOMATED COUNT: 13.4 % (ref 11.6–15.1)
FLUAV RNA NPH QL NAA+NON-PROBE: NOT DETECTED
FLUBV RNA NPH QL NAA+NON-PROBE: NOT DETECTED
GFR SERPL CREATININE-BSD FRML MDRD: 111 ML/MIN/1.73SQ M
GLUCOSE SERPL-MCNC: 112 MG/DL (ref 65–140)
HADV DNA NPH QL NAA+NON-PROBE: NOT DETECTED
HCT VFR BLD AUTO: 36.6 % (ref 36.5–49.3)
HGB BLD-MCNC: 12 G/DL (ref 12–17)
HMPV RNA NPH QL NAA+NON-PROBE: NOT DETECTED
HPIV 1+2+3+4 RNA NPH QL NAA+NON-PROBE: NOT DETECTED
HPIV 1+2+3+4 RNA NPH QL NAA+NON-PROBE: NOT DETECTED
IGA SERPL-MCNC: 236 MG/DL (ref 70–400)
IGG SERPL-MCNC: 808 MG/DL (ref 700–1600)
IGM SERPL-MCNC: 35 MG/DL (ref 40–230)
IMM GRANULOCYTES # BLD AUTO: 0.21 THOUSAND/UL (ref 0–0.2)
IMM GRANULOCYTES NFR BLD AUTO: 1 % (ref 0–2)
L PNEUMO1 AG UR QL IA.RAPID: NEGATIVE
LYMPHOCYTES # BLD AUTO: 3 THOUSANDS/ΜL (ref 0.6–4.47)
LYMPHOCYTES NFR BLD AUTO: 16 % (ref 14–44)
M PNEUMO DNA NPH QL NAA+NON-PROBE: NOT DETECTED
MCH RBC QN AUTO: 28.5 PG (ref 26.8–34.3)
MCHC RBC AUTO-ENTMCNC: 32.8 G/DL (ref 31.4–37.4)
MCV RBC AUTO: 87 FL (ref 82–98)
MONOCYTES # BLD AUTO: 1.66 THOUSAND/ΜL (ref 0.17–1.22)
MONOCYTES NFR BLD AUTO: 9 % (ref 4–12)
NEUTROPHILS # BLD AUTO: 13.78 THOUSANDS/ΜL (ref 1.85–7.62)
NEUTS SEG NFR BLD AUTO: 73 % (ref 43–75)
NRBC BLD AUTO-RTO: 0 /100 WBCS
PLATELET # BLD AUTO: 228 THOUSANDS/UL (ref 149–390)
PMV BLD AUTO: 9.9 FL (ref 8.9–12.7)
POTASSIUM SERPL-SCNC: 3.6 MMOL/L (ref 3.5–5.3)
PROCALCITONIN SERPL-MCNC: 11.43 NG/ML
RBC # BLD AUTO: 4.21 MILLION/UL (ref 3.88–5.62)
RSV RNA NPH QL NAA+NON-PROBE: NOT DETECTED
RV+EV RNA NPH QL NAA+NON-PROBE: DETECTED
S PNEUM AG UR QL: NEGATIVE
SODIUM SERPL-SCNC: 137 MMOL/L (ref 136–145)
WBC # BLD AUTO: 18.98 THOUSAND/UL (ref 4.31–10.16)

## 2020-10-08 PROCEDURE — 31624 DX BRONCHOSCOPE/LAVAGE: CPT | Performed by: INTERNAL MEDICINE

## 2020-10-08 PROCEDURE — 87102 FUNGUS ISOLATION CULTURE: CPT | Performed by: INTERNAL MEDICINE

## 2020-10-08 PROCEDURE — 80048 BASIC METABOLIC PNL TOTAL CA: CPT | Performed by: PHYSICIAN ASSISTANT

## 2020-10-08 PROCEDURE — 84145 PROCALCITONIN (PCT): CPT | Performed by: PHYSICIAN ASSISTANT

## 2020-10-08 PROCEDURE — 93306 TTE W/DOPPLER COMPLETE: CPT

## 2020-10-08 PROCEDURE — 87305 ASPERGILLUS AG IA: CPT | Performed by: INTERNAL MEDICINE

## 2020-10-08 PROCEDURE — 88305 TISSUE EXAM BY PATHOLOGIST: CPT | Performed by: PATHOLOGY

## 2020-10-08 PROCEDURE — 99255 IP/OBS CONSLTJ NEW/EST HI 80: CPT | Performed by: INTERNAL MEDICINE

## 2020-10-08 PROCEDURE — 0B9C8ZX DRAINAGE OF RIGHT UPPER LUNG LOBE, VIA NATURAL OR ARTIFICIAL OPENING ENDOSCOPIC, DIAGNOSTIC: ICD-10-PCS | Performed by: INTERNAL MEDICINE

## 2020-10-08 PROCEDURE — 87449 NOS EACH ORGANISM AG IA: CPT | Performed by: PHYSICIAN ASSISTANT

## 2020-10-08 PROCEDURE — 87206 SMEAR FLUORESCENT/ACID STAI: CPT | Performed by: INTERNAL MEDICINE

## 2020-10-08 PROCEDURE — 87070 CULTURE OTHR SPECIMN AEROBIC: CPT | Performed by: INTERNAL MEDICINE

## 2020-10-08 PROCEDURE — 82784 ASSAY IGA/IGD/IGG/IGM EACH: CPT | Performed by: INTERNAL MEDICINE

## 2020-10-08 PROCEDURE — 82787 IGG 1 2 3 OR 4 EACH: CPT | Performed by: INTERNAL MEDICINE

## 2020-10-08 PROCEDURE — 85025 COMPLETE CBC W/AUTO DIFF WBC: CPT | Performed by: PHYSICIAN ASSISTANT

## 2020-10-08 PROCEDURE — 87252 VIRUS INOCULATION TISSUE: CPT | Performed by: INTERNAL MEDICINE

## 2020-10-08 PROCEDURE — 87116 MYCOBACTERIA CULTURE: CPT | Performed by: INTERNAL MEDICINE

## 2020-10-08 PROCEDURE — 89051 BODY FLUID CELL COUNT: CPT | Performed by: PATHOLOGY

## 2020-10-08 PROCEDURE — 88112 CYTOPATH CELL ENHANCE TECH: CPT | Performed by: PATHOLOGY

## 2020-10-08 PROCEDURE — 99232 SBSQ HOSP IP/OBS MODERATE 35: CPT | Performed by: INTERNAL MEDICINE

## 2020-10-08 PROCEDURE — 93306 TTE W/DOPPLER COMPLETE: CPT | Performed by: INTERNAL MEDICINE

## 2020-10-08 RX ORDER — METOPROLOL SUCCINATE 25 MG/1
25 TABLET, EXTENDED RELEASE ORAL DAILY
Status: DISCONTINUED | OUTPATIENT
Start: 2020-10-08 | End: 2020-10-09 | Stop reason: HOSPADM

## 2020-10-08 RX ORDER — LORATADINE 10 MG/1
10 TABLET ORAL DAILY
Status: DISCONTINUED | OUTPATIENT
Start: 2020-10-08 | End: 2020-10-09 | Stop reason: HOSPADM

## 2020-10-08 RX ORDER — ACETAMINOPHEN 325 MG/1
650 TABLET ORAL EVERY 6 HOURS PRN
Status: DISCONTINUED | OUTPATIENT
Start: 2020-10-08 | End: 2020-10-09 | Stop reason: HOSPADM

## 2020-10-08 RX ORDER — GLYCOPYRROLATE 0.2 MG/ML
INJECTION INTRAMUSCULAR; INTRAVENOUS AS NEEDED
Status: DISCONTINUED | OUTPATIENT
Start: 2020-10-08 | End: 2020-10-08

## 2020-10-08 RX ORDER — FENTANYL CITRATE 50 UG/ML
INJECTION, SOLUTION INTRAMUSCULAR; INTRAVENOUS AS NEEDED
Status: DISCONTINUED | OUTPATIENT
Start: 2020-10-08 | End: 2020-10-08

## 2020-10-08 RX ORDER — ESCITALOPRAM OXALATE 10 MG/1
10 TABLET ORAL DAILY
Status: DISCONTINUED | OUTPATIENT
Start: 2020-10-08 | End: 2020-10-09 | Stop reason: HOSPADM

## 2020-10-08 RX ORDER — MIDAZOLAM HYDROCHLORIDE 2 MG/2ML
INJECTION, SOLUTION INTRAMUSCULAR; INTRAVENOUS AS NEEDED
Status: DISCONTINUED | OUTPATIENT
Start: 2020-10-08 | End: 2020-10-08

## 2020-10-08 RX ORDER — PROPOFOL 10 MG/ML
INJECTION, EMULSION INTRAVENOUS AS NEEDED
Status: DISCONTINUED | OUTPATIENT
Start: 2020-10-08 | End: 2020-10-08

## 2020-10-08 RX ORDER — GUAIFENESIN 600 MG
600 TABLET, EXTENDED RELEASE 12 HR ORAL 2 TIMES DAILY
Status: DISCONTINUED | OUTPATIENT
Start: 2020-10-08 | End: 2020-10-09 | Stop reason: HOSPADM

## 2020-10-08 RX ORDER — SODIUM CHLORIDE, SODIUM LACTATE, POTASSIUM CHLORIDE, CALCIUM CHLORIDE 600; 310; 30; 20 MG/100ML; MG/100ML; MG/100ML; MG/100ML
INJECTION, SOLUTION INTRAVENOUS CONTINUOUS PRN
Status: DISCONTINUED | OUTPATIENT
Start: 2020-10-08 | End: 2020-10-08

## 2020-10-08 RX ORDER — LOSARTAN POTASSIUM 50 MG/1
100 TABLET ORAL DAILY
Status: DISCONTINUED | OUTPATIENT
Start: 2020-10-08 | End: 2020-10-09 | Stop reason: HOSPADM

## 2020-10-08 RX ADMIN — GUAIFENESIN 600 MG: 600 TABLET, EXTENDED RELEASE ORAL at 18:06

## 2020-10-08 RX ADMIN — MIDAZOLAM HYDROCHLORIDE 1 MG: 1 INJECTION, SOLUTION INTRAMUSCULAR; INTRAVENOUS at 12:23

## 2020-10-08 RX ADMIN — METOPROLOL SUCCINATE 25 MG: 25 TABLET, EXTENDED RELEASE ORAL at 08:24

## 2020-10-08 RX ADMIN — GUAIFENESIN 600 MG: 600 TABLET, EXTENDED RELEASE ORAL at 08:24

## 2020-10-08 RX ADMIN — PROPOFOL 50 MG: 10 INJECTION, EMULSION INTRAVENOUS at 12:25

## 2020-10-08 RX ADMIN — CEFTRIAXONE 2000 MG: 2 INJECTION, POWDER, FOR SOLUTION INTRAMUSCULAR; INTRAVENOUS at 21:34

## 2020-10-08 RX ADMIN — SODIUM CHLORIDE, SODIUM LACTATE, POTASSIUM CHLORIDE, AND CALCIUM CHLORIDE: .6; .31; .03; .02 INJECTION, SOLUTION INTRAVENOUS at 12:24

## 2020-10-08 RX ADMIN — ACETAMINOPHEN 650 MG: 325 TABLET, FILM COATED ORAL at 21:47

## 2020-10-08 RX ADMIN — GLYCOPYRROLATE 0.3 MG: 0.2 INJECTION, SOLUTION INTRAMUSCULAR; INTRAVENOUS at 12:21

## 2020-10-08 RX ADMIN — FENTANYL CITRATE 50 MCG: 50 INJECTION INTRAMUSCULAR; INTRAVENOUS at 12:25

## 2020-10-08 RX ADMIN — ENOXAPARIN SODIUM 40 MG: 40 INJECTION SUBCUTANEOUS at 08:24

## 2020-10-08 RX ADMIN — ESCITALOPRAM OXALATE 10 MG: 10 TABLET ORAL at 08:24

## 2020-10-08 RX ADMIN — PERFLUTREN 0.6 ML/MIN: 6.52 INJECTION, SUSPENSION INTRAVENOUS at 15:00

## 2020-10-08 RX ADMIN — PROPOFOL 20 MG: 10 INJECTION, EMULSION INTRAVENOUS at 12:32

## 2020-10-08 RX ADMIN — ACETAMINOPHEN 650 MG: 325 TABLET, FILM COATED ORAL at 08:43

## 2020-10-08 RX ADMIN — LORATADINE 10 MG: 10 TABLET ORAL at 08:24

## 2020-10-08 RX ADMIN — LOSARTAN POTASSIUM 100 MG: 50 TABLET, FILM COATED ORAL at 08:24

## 2020-10-08 RX ADMIN — SODIUM CHLORIDE 125 ML/HR: 0.9 INJECTION, SOLUTION INTRAVENOUS at 00:22

## 2020-10-08 RX ADMIN — PROPOFOL 30 MG: 10 INJECTION, EMULSION INTRAVENOUS at 12:29

## 2020-10-09 VITALS
TEMPERATURE: 98.5 F | SYSTOLIC BLOOD PRESSURE: 112 MMHG | RESPIRATION RATE: 18 BRPM | OXYGEN SATURATION: 94 % | HEART RATE: 85 BPM | DIASTOLIC BLOOD PRESSURE: 60 MMHG

## 2020-10-09 PROBLEM — J18.9 RIGHT UPPER LOBE PNEUMONIA: Status: RESOLVED | Noted: 2020-10-07 | Resolved: 2020-10-09

## 2020-10-09 LAB
ALBUMIN SERPL BCP-MCNC: 2.6 G/DL (ref 3.5–5)
ALP SERPL-CCNC: 58 U/L (ref 46–116)
ALT SERPL W P-5'-P-CCNC: 32 U/L (ref 12–78)
ANION GAP SERPL CALCULATED.3IONS-SCNC: 7 MMOL/L (ref 4–13)
AST SERPL W P-5'-P-CCNC: 14 U/L (ref 5–45)
BACTERIA BLD CULT: ABNORMAL
BASOPHILS # BLD AUTO: 0.04 THOUSANDS/ΜL (ref 0–0.1)
BASOPHILS NFR BLD AUTO: 0 % (ref 0–1)
BILIRUB SERPL-MCNC: 0.2 MG/DL (ref 0.2–1)
BUN SERPL-MCNC: 16 MG/DL (ref 5–25)
CALCIUM ALBUM COR SERPL-MCNC: 9.7 MG/DL (ref 8.3–10.1)
CALCIUM SERPL-MCNC: 8.6 MG/DL (ref 8.3–10.1)
CHLORIDE SERPL-SCNC: 107 MMOL/L (ref 100–108)
CO2 SERPL-SCNC: 26 MMOL/L (ref 21–32)
CREAT SERPL-MCNC: 1 MG/DL (ref 0.6–1.3)
EOSINOPHIL # BLD AUTO: 0.23 THOUSAND/ΜL (ref 0–0.61)
EOSINOPHIL NFR BLD AUTO: 2 % (ref 0–6)
ERYTHROCYTE [DISTWIDTH] IN BLOOD BY AUTOMATED COUNT: 13.6 % (ref 11.6–15.1)
GFR SERPL CREATININE-BSD FRML MDRD: 96 ML/MIN/1.73SQ M
GLUCOSE SERPL-MCNC: 102 MG/DL (ref 65–140)
GRAM STN SPEC: ABNORMAL
HCT VFR BLD AUTO: 37.3 % (ref 36.5–49.3)
HGB BLD-MCNC: 11.9 G/DL (ref 12–17)
IMM GRANULOCYTES # BLD AUTO: 0.1 THOUSAND/UL (ref 0–0.2)
IMM GRANULOCYTES NFR BLD AUTO: 1 % (ref 0–2)
LYMPHOCYTES # BLD AUTO: 2.8 THOUSANDS/ΜL (ref 0.6–4.47)
LYMPHOCYTES NFR BLD AUTO: 24 % (ref 14–44)
MCH RBC QN AUTO: 28.3 PG (ref 26.8–34.3)
MCHC RBC AUTO-ENTMCNC: 31.9 G/DL (ref 31.4–37.4)
MCV RBC AUTO: 89 FL (ref 82–98)
MONOCYTES # BLD AUTO: 1.06 THOUSAND/ΜL (ref 0.17–1.22)
MONOCYTES NFR BLD AUTO: 9 % (ref 4–12)
NEUTROPHILS # BLD AUTO: 7.41 THOUSANDS/ΜL (ref 1.85–7.62)
NEUTS SEG NFR BLD AUTO: 64 % (ref 43–75)
NRBC BLD AUTO-RTO: 0 /100 WBCS
PLATELET # BLD AUTO: 228 THOUSANDS/UL (ref 149–390)
PMV BLD AUTO: 9.8 FL (ref 8.9–12.7)
POTASSIUM SERPL-SCNC: 3.9 MMOL/L (ref 3.5–5.3)
PROCALCITONIN SERPL-MCNC: 8 NG/ML
PROT SERPL-MCNC: 6.6 G/DL (ref 6.4–8.2)
RBC # BLD AUTO: 4.2 MILLION/UL (ref 3.88–5.62)
SARS-COV-2 RNA SPEC QL NAA+PROBE: NOT DETECTED
SODIUM SERPL-SCNC: 140 MMOL/L (ref 136–145)
WBC # BLD AUTO: 11.64 THOUSAND/UL (ref 4.31–10.16)

## 2020-10-09 PROCEDURE — 99238 HOSP IP/OBS DSCHRG MGMT 30/<: CPT | Performed by: INTERNAL MEDICINE

## 2020-10-09 PROCEDURE — 99232 SBSQ HOSP IP/OBS MODERATE 35: CPT | Performed by: INTERNAL MEDICINE

## 2020-10-09 PROCEDURE — RECHECK: Performed by: INTERNAL MEDICINE

## 2020-10-09 PROCEDURE — 84145 PROCALCITONIN (PCT): CPT | Performed by: PHYSICIAN ASSISTANT

## 2020-10-09 PROCEDURE — 85025 COMPLETE CBC W/AUTO DIFF WBC: CPT | Performed by: INTERNAL MEDICINE

## 2020-10-09 PROCEDURE — 87389 HIV-1 AG W/HIV-1&-2 AB AG IA: CPT | Performed by: PHYSICIAN ASSISTANT

## 2020-10-09 PROCEDURE — 80053 COMPREHEN METABOLIC PANEL: CPT | Performed by: INTERNAL MEDICINE

## 2020-10-09 RX ORDER — AMOXICILLIN 500 MG/1
1000 CAPSULE ORAL EVERY 8 HOURS SCHEDULED
Qty: 36 CAPSULE | Refills: 0 | Status: SHIPPED | OUTPATIENT
Start: 2020-10-10 | End: 2020-10-16

## 2020-10-09 RX ORDER — GUAIFENESIN 600 MG
600 TABLET, EXTENDED RELEASE 12 HR ORAL 2 TIMES DAILY
Refills: 0
Start: 2020-10-10 | End: 2020-12-14 | Stop reason: HOSPADM

## 2020-10-09 RX ADMIN — LORATADINE 10 MG: 10 TABLET ORAL at 08:40

## 2020-10-09 RX ADMIN — ESCITALOPRAM OXALATE 10 MG: 10 TABLET ORAL at 08:40

## 2020-10-09 RX ADMIN — LOSARTAN POTASSIUM 100 MG: 50 TABLET, FILM COATED ORAL at 08:40

## 2020-10-09 RX ADMIN — ENOXAPARIN SODIUM 40 MG: 40 INJECTION SUBCUTANEOUS at 08:40

## 2020-10-09 RX ADMIN — GUAIFENESIN 600 MG: 600 TABLET, EXTENDED RELEASE ORAL at 17:44

## 2020-10-09 RX ADMIN — GUAIFENESIN 600 MG: 600 TABLET, EXTENDED RELEASE ORAL at 08:40

## 2020-10-09 RX ADMIN — METOPROLOL SUCCINATE 25 MG: 25 TABLET, EXTENDED RELEASE ORAL at 08:40

## 2020-10-09 RX ADMIN — CEFTRIAXONE 2000 MG: 2 INJECTION, POWDER, FOR SOLUTION INTRAMUSCULAR; INTRAVENOUS at 18:12

## 2020-10-10 LAB
ATRIAL RATE: 115 BPM
BACTERIA BRONCH AEROBE CULT: NORMAL
GRAM STN SPEC: NORMAL
GRAM STN SPEC: NORMAL
IGG SERPL-MCNC: 886 MG/DL (ref 603–1613)
IGG1 SER-MCNC: 485 MG/DL (ref 248–810)
IGG2 SER-MCNC: 221 MG/DL (ref 130–555)
IGG3 SER-MCNC: 27 MG/DL (ref 15–102)
IGG4 SER-MCNC: 25 MG/DL (ref 2–96)
P AXIS: 67 DEGREES
PR INTERVAL: 178 MS
QRS AXIS: 70 DEGREES
QRSD INTERVAL: 84 MS
QT INTERVAL: 294 MS
QTC INTERVAL: 398 MS
T WAVE AXIS: 61 DEGREES
VENTRICULAR RATE: 110 BPM

## 2020-10-10 PROCEDURE — 93010 ELECTROCARDIOGRAM REPORT: CPT | Performed by: INTERNAL MEDICINE

## 2020-10-12 ENCOUNTER — TRANSITIONAL CARE MANAGEMENT (OUTPATIENT)
Dept: FAMILY MEDICINE CLINIC | Facility: CLINIC | Age: 40
End: 2020-10-12

## 2020-10-12 DIAGNOSIS — J15.3: Primary | ICD-10-CM

## 2020-10-12 LAB
BACTERIA BLD CULT: NORMAL
GALACTOMANNAN AG SPEC IA-ACNC: 0.11 INDEX (ref 0–0.49)
HIV 1+2 AB+HIV1 P24 AG SERPL QL IA: NORMAL

## 2020-10-13 DIAGNOSIS — F90.9 ATTENTION DEFICIT HYPERACTIVITY DISORDER (ADHD), UNSPECIFIED ADHD TYPE: ICD-10-CM

## 2020-10-13 LAB
BACTERIA BLD CULT: NORMAL
BACTERIA BLD CULT: NORMAL

## 2020-10-13 RX ORDER — LISDEXAMFETAMINE DIMESYLATE 50 MG
50 CAPSULE ORAL DAILY
Qty: 30 CAPSULE | Refills: 0 | Status: SHIPPED | OUTPATIENT
Start: 2020-10-13 | End: 2020-11-10 | Stop reason: SDUPTHER

## 2020-10-19 ENCOUNTER — TELEMEDICINE (OUTPATIENT)
Dept: FAMILY MEDICINE CLINIC | Facility: CLINIC | Age: 40
End: 2020-10-19
Payer: COMMERCIAL

## 2020-10-19 VITALS
BODY MASS INDEX: 42.22 KG/M2 | DIASTOLIC BLOOD PRESSURE: 82 MMHG | SYSTOLIC BLOOD PRESSURE: 133 MMHG | WEIGHT: 315 LBS | TEMPERATURE: 97.5 F

## 2020-10-19 DIAGNOSIS — J18.9 COMMUNITY ACQUIRED PNEUMONIA OF RIGHT UPPER LOBE OF LUNG: Primary | ICD-10-CM

## 2020-10-19 DIAGNOSIS — R78.81 POSITIVE BLOOD CULTURE: ICD-10-CM

## 2020-10-19 PROCEDURE — 99495 TRANSJ CARE MGMT MOD F2F 14D: CPT | Performed by: FAMILY MEDICINE

## 2020-10-23 DIAGNOSIS — F32.A ANXIETY AND DEPRESSION: ICD-10-CM

## 2020-10-23 DIAGNOSIS — F41.9 ANXIETY AND DEPRESSION: ICD-10-CM

## 2020-10-23 DIAGNOSIS — I10 ESSENTIAL HYPERTENSION: ICD-10-CM

## 2020-10-26 RX ORDER — ESCITALOPRAM OXALATE 10 MG/1
10 TABLET ORAL DAILY
Qty: 90 TABLET | Refills: 3 | Status: SHIPPED | OUTPATIENT
Start: 2020-10-26 | End: 2021-07-14 | Stop reason: SDUPTHER

## 2020-10-26 RX ORDER — LOSARTAN POTASSIUM 100 MG/1
100 TABLET ORAL DAILY
Qty: 30 TABLET | Refills: 0 | Status: SHIPPED | OUTPATIENT
Start: 2020-10-26 | End: 2021-01-26 | Stop reason: SDUPTHER
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
## 2020-11-02 RX ORDER — DOXYCYCLINE 100 MG/1
100 CAPSULE ORAL 2 TIMES DAILY
Qty: 42 CAPSULE | Refills: 0 | Status: SHIPPED | OUTPATIENT
Start: 2020-11-02 | End: 2020-11-23

## 2020-11-05 DIAGNOSIS — G25.81 RESTLESS LEG SYNDROME: Primary | ICD-10-CM

## 2020-11-07 ENCOUNTER — TRANSCRIBE ORDERS (OUTPATIENT)
Dept: ADMINISTRATIVE | Facility: HOSPITAL | Age: 40
End: 2020-11-07

## 2020-11-07 ENCOUNTER — LAB (OUTPATIENT)
Dept: LAB | Facility: HOSPITAL | Age: 40
End: 2020-11-07
Payer: COMMERCIAL

## 2020-11-07 DIAGNOSIS — D84.9 IMMUNODEFICIENCY, UNSPECIFIED (HCC): ICD-10-CM

## 2020-11-07 DIAGNOSIS — G25.81 RESTLESS LEG SYNDROME: ICD-10-CM

## 2020-11-07 DIAGNOSIS — D80.4 IGM DEFICIENCY (HCC): ICD-10-CM

## 2020-11-07 DIAGNOSIS — M79.10 MYALGIA: ICD-10-CM

## 2020-11-07 DIAGNOSIS — E55.9 VITAMIN D DEFICIENCY: ICD-10-CM

## 2020-11-07 LAB
25(OH)D3 SERPL-MCNC: 12.4 NG/ML (ref 30–100)
ALBUMIN SERPL BCP-MCNC: 3.5 G/DL (ref 3.5–5)
ALP SERPL-CCNC: 105 U/L (ref 46–116)
ALT SERPL W P-5'-P-CCNC: 58 U/L (ref 12–78)
ANION GAP SERPL CALCULATED.3IONS-SCNC: 8 MMOL/L (ref 4–13)
AST SERPL W P-5'-P-CCNC: 37 U/L (ref 5–45)
BASOPHILS # BLD AUTO: 0.04 THOUSANDS/ΜL (ref 0–0.1)
BASOPHILS NFR BLD AUTO: 1 % (ref 0–1)
BILIRUB SERPL-MCNC: 0.2 MG/DL (ref 0.2–1)
BUN SERPL-MCNC: 17 MG/DL (ref 5–25)
CA-I BLD-SCNC: 1.12 MMOL/L (ref 1.12–1.32)
CALCIUM SERPL-MCNC: 8.6 MG/DL (ref 8.3–10.1)
CHLORIDE SERPL-SCNC: 103 MMOL/L (ref 100–108)
CO2 SERPL-SCNC: 27 MMOL/L (ref 21–32)
CREAT SERPL-MCNC: 0.71 MG/DL (ref 0.6–1.3)
EOSINOPHIL # BLD AUTO: 0.49 THOUSAND/ΜL (ref 0–0.61)
EOSINOPHIL NFR BLD AUTO: 8 % (ref 0–6)
ERYTHROCYTE [DISTWIDTH] IN BLOOD BY AUTOMATED COUNT: 13.1 % (ref 11.6–15.1)
GFR SERPL CREATININE-BSD FRML MDRD: 120 ML/MIN/1.73SQ M
GLUCOSE P FAST SERPL-MCNC: 126 MG/DL (ref 65–99)
HCT VFR BLD AUTO: 43.4 % (ref 36.5–49.3)
HCV AB SER QL: NORMAL
HGB BLD-MCNC: 14 G/DL (ref 12–17)
IGA SERPL-MCNC: 356 MG/DL (ref 70–400)
IGG SERPL-MCNC: 1080 MG/DL (ref 700–1600)
IGM SERPL-MCNC: 55 MG/DL (ref 40–230)
IMM GRANULOCYTES # BLD AUTO: 0.03 THOUSAND/UL (ref 0–0.2)
IMM GRANULOCYTES NFR BLD AUTO: 1 % (ref 0–2)
LYMPHOCYTES # BLD AUTO: 2.72 THOUSANDS/ΜL (ref 0.6–4.47)
LYMPHOCYTES NFR BLD AUTO: 42 % (ref 14–44)
MAGNESIUM SERPL-MCNC: 1.8 MG/DL (ref 1.6–2.6)
MCH RBC QN AUTO: 27.7 PG (ref 26.8–34.3)
MCHC RBC AUTO-ENTMCNC: 32.3 G/DL (ref 31.4–37.4)
MCV RBC AUTO: 86 FL (ref 82–98)
MONOCYTES # BLD AUTO: 0.6 THOUSAND/ΜL (ref 0.17–1.22)
MONOCYTES NFR BLD AUTO: 10 % (ref 4–12)
NEUTROPHILS # BLD AUTO: 2.34 THOUSANDS/ΜL (ref 1.85–7.62)
NEUTS SEG NFR BLD AUTO: 38 % (ref 43–75)
NRBC BLD AUTO-RTO: 0 /100 WBCS
PLATELET # BLD AUTO: 209 THOUSANDS/UL (ref 149–390)
PMV BLD AUTO: 9.6 FL (ref 8.9–12.7)
POTASSIUM SERPL-SCNC: 3.8 MMOL/L (ref 3.5–5.3)
PROT SERPL-MCNC: 7.2 G/DL (ref 6.4–8.2)
RBC # BLD AUTO: 5.05 MILLION/UL (ref 3.88–5.62)
SODIUM SERPL-SCNC: 138 MMOL/L (ref 136–145)
WBC # BLD AUTO: 6.22 THOUSAND/UL (ref 4.31–10.16)

## 2020-11-07 PROCEDURE — 84165 PROTEIN E-PHORESIS SERUM: CPT | Performed by: PATHOLOGY

## 2020-11-07 PROCEDURE — 84166 PROTEIN E-PHORESIS/URINE/CSF: CPT

## 2020-11-07 PROCEDURE — 86609 BACTERIUM ANTIBODY: CPT

## 2020-11-07 PROCEDURE — 82306 VITAMIN D 25 HYDROXY: CPT

## 2020-11-07 PROCEDURE — 86684 HEMOPHILUS INFLUENZA ANTIBDY: CPT

## 2020-11-07 PROCEDURE — 86765 RUBEOLA ANTIBODY: CPT

## 2020-11-07 PROCEDURE — 82330 ASSAY OF CALCIUM: CPT

## 2020-11-07 PROCEDURE — 85025 COMPLETE CBC W/AUTO DIFF WBC: CPT

## 2020-11-07 PROCEDURE — 36415 COLL VENOUS BLD VENIPUNCTURE: CPT

## 2020-11-07 PROCEDURE — 83735 ASSAY OF MAGNESIUM: CPT

## 2020-11-07 PROCEDURE — 82784 ASSAY IGA/IGD/IGG/IGM EACH: CPT

## 2020-11-07 PROCEDURE — 80053 COMPREHEN METABOLIC PANEL: CPT

## 2020-11-07 PROCEDURE — 84165 PROTEIN E-PHORESIS SERUM: CPT

## 2020-11-07 PROCEDURE — 86317 IMMUNOASSAY INFECTIOUS AGENT: CPT

## 2020-11-07 PROCEDURE — 86803 HEPATITIS C AB TEST: CPT

## 2020-11-07 PROCEDURE — 82787 IGG 1 2 3 OR 4 EACH: CPT

## 2020-11-07 PROCEDURE — 84166 PROTEIN E-PHORESIS/URINE/CSF: CPT | Performed by: PATHOLOGY

## 2020-11-07 PROCEDURE — 86480 TB TEST CELL IMMUN MEASURE: CPT

## 2020-11-09 LAB
ALBUMIN SERPL ELPH-MCNC: 3.98 G/DL (ref 3.5–5)
ALBUMIN SERPL ELPH-MCNC: 56.8 % (ref 52–65)
ALBUMIN UR ELPH-MCNC: 100 %
ALPHA1 GLOB MFR UR ELPH: 0 %
ALPHA1 GLOB SERPL ELPH-MCNC: 0.29 G/DL (ref 0.1–0.4)
ALPHA1 GLOB SERPL ELPH-MCNC: 4.2 % (ref 2.5–5)
ALPHA2 GLOB MFR UR ELPH: 0 %
ALPHA2 GLOB SERPL ELPH-MCNC: 0.67 G/DL (ref 0.4–1.2)
ALPHA2 GLOB SERPL ELPH-MCNC: 9.6 % (ref 7–13)
B-GLOBULIN MFR UR ELPH: 0 %
BETA GLOB ABNORMAL SERPL ELPH-MCNC: 0.5 G/DL (ref 0.4–0.8)
BETA1 GLOB SERPL ELPH-MCNC: 7.2 % (ref 5–13)
BETA2 GLOB SERPL ELPH-MCNC: 6.6 % (ref 2–8)
BETA2+GAMMA GLOB SERPL ELPH-MCNC: 0.46 G/DL (ref 0.2–0.5)
FUNGUS SPEC CULT: NORMAL
GAMMA GLOB ABNORMAL SERPL ELPH-MCNC: 1.09 G/DL (ref 0.5–1.6)
GAMMA GLOB MFR UR ELPH: 0 %
GAMMA GLOB SERPL ELPH-MCNC: 15.6 % (ref 12–22)
GAMMA INTERFERON BACKGROUND BLD IA-ACNC: 0.03 IU/ML
HAEM INFLU B IGG SER IA-MCNC: >9 UG/ML
IGG SERPL-MCNC: 1049 MG/DL (ref 603–1613)
IGG/ALB SER: 1.31 {RATIO} (ref 1.1–1.8)
IGG1 SER-MCNC: 616 MG/DL (ref 248–810)
IGG2 SER-MCNC: 293 MG/DL (ref 130–555)
IGG3 SER-MCNC: 50 MG/DL (ref 15–102)
IGG4 SER-MCNC: 31 MG/DL (ref 2–96)
M TB IFN-G BLD-IMP: NEGATIVE
M TB IFN-G CD4+ BCKGRND COR BLD-ACNC: 0 IU/ML
M TB IFN-G CD4+ BCKGRND COR BLD-ACNC: 0 IU/ML
MITOGEN IGNF BCKGRD COR BLD-ACNC: >10 IU/ML
PROT PATTERN SERPL ELPH-IMP: NORMAL
PROT PATTERN UR ELPH-IMP: ABNORMAL
PROT SERPL-MCNC: 7 G/DL (ref 6.4–8.2)
PROT UR-MCNC: 24 MG/DL

## 2020-11-10 DIAGNOSIS — F90.9 ATTENTION DEFICIT HYPERACTIVITY DISORDER (ADHD), UNSPECIFIED ADHD TYPE: ICD-10-CM

## 2020-11-10 LAB
C DIPHTHERIAE AB SER IA-ACNC: 1.01 IU/ML
C TETANI IGG SER IA-ACNC: >7 IU/ML
MEV IGG SER QL: NORMAL

## 2020-11-10 RX ORDER — LISDEXAMFETAMINE DIMESYLATE 50 MG
50 CAPSULE ORAL DAILY
Qty: 30 CAPSULE | Refills: 0 | Status: SHIPPED | OUTPATIENT
Start: 2020-11-10 | End: 2020-12-08 | Stop reason: SDUPTHER

## 2020-11-16 LAB
DEPRECATED S PNEUM14 IGG SER-MCNC: <0.1 UG/ML
DEPRECATED S PNEUM19 IGG SER-MCNC: 2.5 UG/ML
DEPRECATED S PNEUM23 IGG SER-MCNC: <0.1 UG/ML
DEPRECATED S PNEUM3 IGG SER-MCNC: 2.6 UG/ML
DEPRECATED S PNEUM4 IGG SER-MCNC: <0.1 UG/ML
DEPRECATED S PNEUM8 AB SER-MCNC: 0.9 UG/ML
DEPRECATED S PNEUM9 IGG SER-MCNC: <0.1 UG/ML
FLUBV RNA SPEC QL NAA+PROBE: <0.1 UG/ML
S PNEUM DA 18C IGG SER-MCNC: <0.1 UG/ML
S PNEUM DA 19A IGG SER-MCNC: 2.2 UG/ML
S PNEUM DA 6B IGG SER-MCNC: <0.1 UG/ML
STREP PNEUMO TYPE 1: 0.2 UG/ML
STREP PNEUMO TYPE 51: <0.1 UG/ML
STREP PNEUMO TYPE 68: <0.1 UG/ML

## 2020-11-24 LAB
MYCOBACTERIUM SPEC CULT: NORMAL
RHODAMINE-AURAMINE STN SPEC: NORMAL

## 2020-12-07 ENCOUNTER — TRANSCRIBE ORDERS (OUTPATIENT)
Dept: RADIOLOGY | Facility: HOSPITAL | Age: 40
End: 2020-12-07

## 2020-12-07 ENCOUNTER — HOSPITAL ENCOUNTER (OUTPATIENT)
Dept: RADIOLOGY | Facility: HOSPITAL | Age: 40
Discharge: HOME | End: 2020-12-07
Attending: INTERNAL MEDICINE
Payer: COMMERCIAL

## 2020-12-07 DIAGNOSIS — J15.3: ICD-10-CM

## 2020-12-07 PROCEDURE — 71250 CT THORAX DX C-: CPT

## 2020-12-07 PROCEDURE — G1004 CDSM NDSC: HCPCS

## 2020-12-08 DIAGNOSIS — F90.9 ATTENTION DEFICIT HYPERACTIVITY DISORDER (ADHD), UNSPECIFIED ADHD TYPE: ICD-10-CM

## 2020-12-08 RX ORDER — LISDEXAMFETAMINE DIMESYLATE 50 MG
50 CAPSULE ORAL DAILY
Qty: 30 CAPSULE | Refills: 0 | Status: SHIPPED | OUTPATIENT
Start: 2020-12-08 | End: 2021-01-07 | Stop reason: SDUPTHER

## 2020-12-11 ENCOUNTER — TELEMEDICINE (OUTPATIENT)
Dept: PULMONOLOGY | Facility: CLINIC | Age: 40
End: 2020-12-11
Payer: COMMERCIAL

## 2020-12-11 DIAGNOSIS — G47.33 OSA (OBSTRUCTIVE SLEEP APNEA): Primary | ICD-10-CM

## 2020-12-11 DIAGNOSIS — G47.19 EXCESSIVE DAYTIME SLEEPINESS: ICD-10-CM

## 2020-12-11 DIAGNOSIS — G47.61 PERIODIC LIMB MOVEMENT: ICD-10-CM

## 2020-12-11 PROCEDURE — 99214 OFFICE O/P EST MOD 30 MIN: CPT | Performed by: INTERNAL MEDICINE

## 2020-12-14 ENCOUNTER — PATIENT MESSAGE (OUTPATIENT)
Dept: FAMILY MEDICINE CLINIC | Facility: CLINIC | Age: 40
End: 2020-12-14

## 2020-12-14 ENCOUNTER — TELEMEDICINE (OUTPATIENT)
Dept: FAMILY MEDICINE CLINIC | Facility: CLINIC | Age: 40
End: 2020-12-14
Payer: COMMERCIAL

## 2020-12-14 VITALS — SYSTOLIC BLOOD PRESSURE: 132 MMHG | BODY MASS INDEX: 42.88 KG/M2 | WEIGHT: 315 LBS | DIASTOLIC BLOOD PRESSURE: 78 MMHG

## 2020-12-14 DIAGNOSIS — J45.20 MILD INTERMITTENT ASTHMA WITHOUT COMPLICATION: ICD-10-CM

## 2020-12-14 DIAGNOSIS — R80.8 OTHER PROTEINURIA: ICD-10-CM

## 2020-12-14 DIAGNOSIS — G47.33 OSA (OBSTRUCTIVE SLEEP APNEA): ICD-10-CM

## 2020-12-14 DIAGNOSIS — R73.01 IMPAIRED FASTING GLUCOSE: Primary | ICD-10-CM

## 2020-12-14 DIAGNOSIS — J18.9 PNEUMONIA OF RIGHT UPPER LOBE DUE TO INFECTIOUS ORGANISM: ICD-10-CM

## 2020-12-14 DIAGNOSIS — Z13.6 SCREENING FOR CARDIOVASCULAR CONDITION: ICD-10-CM

## 2020-12-14 DIAGNOSIS — I10 ESSENTIAL HYPERTENSION: ICD-10-CM

## 2020-12-14 PROCEDURE — 99214 OFFICE O/P EST MOD 30 MIN: CPT | Performed by: FAMILY MEDICINE

## 2020-12-19 DIAGNOSIS — G47.19 EXCESSIVE DAYTIME SLEEPINESS: Primary | ICD-10-CM

## 2020-12-19 DIAGNOSIS — U07.1 COVID-19 WITH MULTIPLE COMORBIDITIES: ICD-10-CM

## 2020-12-20 DIAGNOSIS — U07.1 COVID-19 WITH MULTIPLE COMORBIDITIES: ICD-10-CM

## 2020-12-20 PROCEDURE — U0003 INFECTIOUS AGENT DETECTION BY NUCLEIC ACID (DNA OR RNA); SEVERE ACUTE RESPIRATORY SYNDROME CORONAVIRUS 2 (SARS-COV-2) (CORONAVIRUS DISEASE [COVID-19]), AMPLIFIED PROBE TECHNIQUE, MAKING USE OF HIGH THROUGHPUT TECHNOLOGIES AS DESCRIBED BY CMS-2020-01-R: HCPCS | Performed by: INTERNAL MEDICINE

## 2020-12-22 LAB — SARS-COV-2 RNA SPEC QL NAA+PROBE: DETECTED

## 2020-12-23 ENCOUNTER — TELEMEDICINE (OUTPATIENT)
Dept: FAMILY MEDICINE CLINIC | Facility: CLINIC | Age: 40
End: 2020-12-23
Payer: COMMERCIAL

## 2020-12-23 VITALS
HEART RATE: 84 BPM | TEMPERATURE: 97.8 F | SYSTOLIC BLOOD PRESSURE: 138 MMHG | DIASTOLIC BLOOD PRESSURE: 64 MMHG | OXYGEN SATURATION: 96 %

## 2020-12-23 DIAGNOSIS — U07.1 COVID-19: Primary | ICD-10-CM

## 2020-12-23 PROCEDURE — 99213 OFFICE O/P EST LOW 20 MIN: CPT | Performed by: FAMILY MEDICINE

## 2020-12-23 RX ORDER — SODIUM CHLORIDE 9 MG/ML
20 INJECTION, SOLUTION INTRAVENOUS ONCE
Status: CANCELLED | OUTPATIENT
Start: 2020-12-26

## 2020-12-23 RX ORDER — ACETAMINOPHEN 325 MG/1
650 TABLET ORAL ONCE AS NEEDED
Status: CANCELLED | OUTPATIENT
Start: 2020-12-26

## 2020-12-23 RX ORDER — ALBUTEROL SULFATE 90 UG/1
3 AEROSOL, METERED RESPIRATORY (INHALATION) ONCE AS NEEDED
Status: CANCELLED | OUTPATIENT
Start: 2020-12-26

## 2020-12-24 ENCOUNTER — DOCUMENTATION (OUTPATIENT)
Dept: FAMILY MEDICINE CLINIC | Facility: CLINIC | Age: 40
End: 2020-12-24

## 2020-12-26 ENCOUNTER — HOSPITAL ENCOUNTER (OUTPATIENT)
Dept: INFUSION CENTER | Facility: HOSPITAL | Age: 40
Discharge: HOME | End: 2020-12-26
Payer: COMMERCIAL

## 2020-12-26 VITALS
HEART RATE: 64 BPM | OXYGEN SATURATION: 97 % | SYSTOLIC BLOOD PRESSURE: 139 MMHG | DIASTOLIC BLOOD PRESSURE: 64 MMHG | TEMPERATURE: 97.4 F | RESPIRATION RATE: 18 BRPM

## 2020-12-26 DIAGNOSIS — U07.1 COVID-19: Primary | ICD-10-CM

## 2020-12-26 PROCEDURE — M0239 BAMLANIVIMAB-XXXX INFUSION: HCPCS | Performed by: FAMILY MEDICINE

## 2020-12-26 RX ORDER — ACETAMINOPHEN 325 MG/1
650 TABLET ORAL ONCE AS NEEDED
Status: DISCONTINUED | OUTPATIENT
Start: 2020-12-26 | End: 2020-12-29 | Stop reason: HOSPADM

## 2020-12-26 RX ORDER — SODIUM CHLORIDE 9 MG/ML
20 INJECTION, SOLUTION INTRAVENOUS ONCE
Status: CANCELLED | OUTPATIENT
Start: 2020-12-26

## 2020-12-26 RX ORDER — SODIUM CHLORIDE 9 MG/ML
20 INJECTION, SOLUTION INTRAVENOUS ONCE
Status: COMPLETED | OUTPATIENT
Start: 2020-12-26 | End: 2020-12-26

## 2020-12-26 RX ORDER — ACETAMINOPHEN 325 MG/1
650 TABLET ORAL ONCE AS NEEDED
Status: CANCELLED | OUTPATIENT
Start: 2020-12-26

## 2020-12-26 RX ORDER — ALBUTEROL SULFATE 90 UG/1
3 AEROSOL, METERED RESPIRATORY (INHALATION) ONCE AS NEEDED
Status: CANCELLED | OUTPATIENT
Start: 2020-12-26

## 2020-12-26 RX ORDER — ALBUTEROL SULFATE 90 UG/1
3 AEROSOL, METERED RESPIRATORY (INHALATION) ONCE AS NEEDED
Status: DISCONTINUED | OUTPATIENT
Start: 2020-12-26 | End: 2020-12-29 | Stop reason: HOSPADM

## 2020-12-26 RX ADMIN — SODIUM CHLORIDE 20 ML/HR: 0.9 INJECTION, SOLUTION INTRAVENOUS at 11:03

## 2020-12-26 RX ADMIN — SODIUM CHLORIDE 700 MG: 9 INJECTION, SOLUTION INTRAVENOUS at 11:36

## 2020-12-28 ENCOUNTER — TELEMEDICINE (OUTPATIENT)
Dept: FAMILY MEDICINE CLINIC | Facility: CLINIC | Age: 40
End: 2020-12-28
Payer: COMMERCIAL

## 2020-12-28 DIAGNOSIS — G47.33 OSA (OBSTRUCTIVE SLEEP APNEA): Primary | ICD-10-CM

## 2020-12-28 DIAGNOSIS — U07.1 COVID-19: Primary | ICD-10-CM

## 2020-12-28 PROCEDURE — 99213 OFFICE O/P EST LOW 20 MIN: CPT | Performed by: FAMILY MEDICINE

## 2020-12-31 ENCOUNTER — TELEMEDICINE (OUTPATIENT)
Dept: CARDIOLOGY CLINIC | Facility: CLINIC | Age: 40
End: 2020-12-31
Payer: COMMERCIAL

## 2020-12-31 DIAGNOSIS — U07.1 COVID-19: ICD-10-CM

## 2020-12-31 DIAGNOSIS — G47.33 OSA (OBSTRUCTIVE SLEEP APNEA): ICD-10-CM

## 2020-12-31 DIAGNOSIS — I10 ESSENTIAL HYPERTENSION: Primary | ICD-10-CM

## 2020-12-31 PROCEDURE — 99214 OFFICE O/P EST MOD 30 MIN: CPT | Performed by: INTERNAL MEDICINE

## 2021-01-07 ENCOUNTER — TELEPHONE (OUTPATIENT)
Dept: PULMONOLOGY | Facility: CLINIC | Age: 41
End: 2021-01-07

## 2021-01-07 DIAGNOSIS — F90.9 ATTENTION DEFICIT HYPERACTIVITY DISORDER (ADHD), UNSPECIFIED ADHD TYPE: ICD-10-CM

## 2021-01-07 RX ORDER — LISDEXAMFETAMINE DIMESYLATE 50 MG
50 CAPSULE ORAL DAILY
Qty: 30 CAPSULE | Refills: 0 | Status: SHIPPED | OUTPATIENT
Start: 2021-01-07 | End: 2021-02-11 | Stop reason: SDUPTHER

## 2021-01-07 NOTE — TELEPHONE ENCOUNTER
Thu Mancera 47 y o  male   MRN: [id number]  1980    COVID-19 Convalescent Plasma Donor Attestation  Verification of Eligibility    Thu Mancera is a 47 y o  male who was diagnosed with COVID-19 infection, has recovered from the illness and wishes to participate in the [facility] convalescent plasma donation program to treat critically ill COVID-19 patients  Donor understands that they will be screened by [address] and if deemed a suitable candidate, donation appointment time will be arranged directly through [address] [employer]  They were also informed that their plasma will be in a donor pool and cannot be directed to a specific patient  Patient tested positive for 2019 Novel Coronavirus 2019 (SARS-CoV-2 RNA) on 12/20/2020  Patient is now recovered from COVID-19 and has been or will be symptom free for at least 28 days, effective 1/25/2021  To facilitate donation, eligibility form sent directly to [address] [facility]  Patients preferred contact number: [phone]    All of the patient's questions were answered via telephone  he is aware to call our office with any further questions or concerns as needed

## 2021-01-07 NOTE — LETTER
Dear Dr Omar Cook is interested in participating in the [facility] COVID-19 convalescent plasma collection program  Please see my attached note verifying eligibility  Sincerely,    Ollie Alan DO        Brayden Garcia 47 y o  male   MRN: [id number]  1980    COVID-19 Convalescent Plasma Donor Attestation  Verification of Eligibility    Brayden Garcia is a 47 y o  male who was diagnosed with COVID-19 infection, has recovered from the illness and wishes to participate in the [facility] convalescent plasma donation program to treat critically ill COVID-19 patients  Donor understands that they will be screened by [address] [employer] and if deemed a suitable candidate, donation appointment time will be arranged directly through [address] [employer]  They were also informed that their plasma will be in a donor pool and cannot be directed to a specific patient  Patient tested positive for 2019 Novel Coronavirus 2019 (SARS-CoV-2 RNA) on 12/20/2020  Patient is now recovered from COVID-19 and has been or will be symptom free for at least 28 days, effective 1/25/2021  To facilitate donation, eligibility form sent directly to [address] [employer]  Patients preferred contact number: [phone]    All of the patient's questions were answered via telephone  he is aware to call our office with any further questions or concerns as needed

## 2021-01-26 DIAGNOSIS — I10 ESSENTIAL HYPERTENSION: ICD-10-CM

## 2021-01-26 RX ORDER — LOSARTAN POTASSIUM 100 MG/1
100 TABLET ORAL DAILY
Qty: 30 TABLET | Refills: 0 | Status: SHIPPED | OUTPATIENT
Start: 2021-01-26 | End: 2021-05-18 | Stop reason: SDUPTHER

## 2021-01-27 ENCOUNTER — DOCUMENTATION (OUTPATIENT)
Dept: FAMILY MEDICINE CLINIC | Facility: CLINIC | Age: 41
End: 2021-01-27

## 2021-01-27 NOTE — PROGRESS NOTES
Changed quantity to 90 with 3 refills at the request of the Patient's wife and called the pharmacy and gave a verbal

## 2021-02-11 DIAGNOSIS — F90.9 ATTENTION DEFICIT HYPERACTIVITY DISORDER (ADHD), UNSPECIFIED ADHD TYPE: ICD-10-CM

## 2021-02-11 RX ORDER — LISDEXAMFETAMINE DIMESYLATE 50 MG
50 CAPSULE ORAL DAILY
Qty: 30 CAPSULE | Refills: 0 | Status: SHIPPED | OUTPATIENT
Start: 2021-02-11 | End: 2021-03-11 | Stop reason: SDUPTHER

## 2021-03-11 DIAGNOSIS — F90.9 ATTENTION DEFICIT HYPERACTIVITY DISORDER (ADHD), UNSPECIFIED ADHD TYPE: ICD-10-CM

## 2021-03-11 RX ORDER — LISDEXAMFETAMINE DIMESYLATE 50 MG
50 CAPSULE ORAL DAILY
Qty: 30 CAPSULE | Refills: 0 | Status: SHIPPED | OUTPATIENT
Start: 2021-03-11 | End: 2021-04-09 | Stop reason: SDUPTHER

## 2021-03-28 ENCOUNTER — IMMUNIZATIONS (OUTPATIENT)
Dept: FAMILY MEDICINE CLINIC | Facility: HOSPITAL | Age: 41
End: 2021-03-28

## 2021-03-28 DIAGNOSIS — Z23 ENCOUNTER FOR IMMUNIZATION: Primary | ICD-10-CM

## 2021-03-28 PROCEDURE — 0001A SARS-COV-2 / COVID-19 MRNA VACCINE (PFIZER-BIONTECH) 30 MCG: CPT

## 2021-03-28 PROCEDURE — 91300 SARS-COV-2 / COVID-19 MRNA VACCINE (PFIZER-BIONTECH) 30 MCG: CPT

## 2021-04-09 DIAGNOSIS — F90.9 ATTENTION DEFICIT HYPERACTIVITY DISORDER (ADHD), UNSPECIFIED ADHD TYPE: ICD-10-CM

## 2021-04-09 RX ORDER — LISDEXAMFETAMINE DIMESYLATE 50 MG
50 CAPSULE ORAL DAILY
Qty: 30 CAPSULE | Refills: 0 | Status: SHIPPED | OUTPATIENT
Start: 2021-04-09 | End: 2021-05-13 | Stop reason: SDUPTHER

## 2021-04-18 ENCOUNTER — IMMUNIZATIONS (OUTPATIENT)
Dept: FAMILY MEDICINE CLINIC | Facility: HOSPITAL | Age: 41
End: 2021-04-18

## 2021-04-18 DIAGNOSIS — Z23 ENCOUNTER FOR IMMUNIZATION: Primary | ICD-10-CM

## 2021-04-18 PROCEDURE — 91300 SARS-COV-2 / COVID-19 MRNA VACCINE (PFIZER-BIONTECH) 30 MCG: CPT

## 2021-04-18 PROCEDURE — 0002A SARS-COV-2 / COVID-19 MRNA VACCINE (PFIZER-BIONTECH) 30 MCG: CPT

## 2021-05-13 ENCOUNTER — TELEPHONE (OUTPATIENT)
Dept: FAMILY MEDICINE CLINIC | Facility: CLINIC | Age: 41
End: 2021-05-13

## 2021-05-13 DIAGNOSIS — F90.9 ATTENTION DEFICIT HYPERACTIVITY DISORDER (ADHD), UNSPECIFIED ADHD TYPE: ICD-10-CM

## 2021-05-13 RX ORDER — LISDEXAMFETAMINE DIMESYLATE 50 MG
50 CAPSULE ORAL DAILY
Qty: 30 CAPSULE | Refills: 0 | Status: SHIPPED | OUTPATIENT
Start: 2021-05-13 | End: 2021-06-09 | Stop reason: SDUPTHER

## 2021-05-13 NOTE — TELEPHONE ENCOUNTER
Pt's wife called requesting Pt to be scheduled for a pneumonia vaccine  Also requesting immunity labs afterward       Stated that it was advised by pulmonologist

## 2021-05-18 DIAGNOSIS — I10 ESSENTIAL HYPERTENSION: ICD-10-CM

## 2021-05-18 RX ORDER — LOSARTAN POTASSIUM 100 MG/1
100 TABLET ORAL DAILY
Qty: 90 TABLET | Refills: 2 | Status: SHIPPED | OUTPATIENT
Start: 2021-05-18 | End: 2021-11-08 | Stop reason: SDUPTHER

## 2021-06-09 DIAGNOSIS — F90.9 ATTENTION DEFICIT HYPERACTIVITY DISORDER (ADHD), UNSPECIFIED ADHD TYPE: ICD-10-CM

## 2021-06-09 RX ORDER — LISDEXAMFETAMINE DIMESYLATE 50 MG
50 CAPSULE ORAL DAILY
Qty: 30 CAPSULE | Refills: 0 | Status: SHIPPED | OUTPATIENT
Start: 2021-06-09 | End: 2021-07-07 | Stop reason: SDUPTHER

## 2021-07-07 DIAGNOSIS — F90.9 ATTENTION DEFICIT HYPERACTIVITY DISORDER (ADHD), UNSPECIFIED ADHD TYPE: ICD-10-CM

## 2021-07-07 RX ORDER — LISDEXAMFETAMINE DIMESYLATE 50 MG
50 CAPSULE ORAL DAILY
Qty: 30 CAPSULE | Refills: 0 | Status: SHIPPED | OUTPATIENT
Start: 2021-07-07 | End: 2021-08-07 | Stop reason: SDUPTHER

## 2021-07-14 DIAGNOSIS — F32.A ANXIETY AND DEPRESSION: ICD-10-CM

## 2021-07-14 DIAGNOSIS — F41.9 ANXIETY AND DEPRESSION: ICD-10-CM

## 2021-07-14 RX ORDER — ESCITALOPRAM OXALATE 10 MG/1
10 TABLET ORAL DAILY
Qty: 90 TABLET | Refills: 0 | Status: SHIPPED | OUTPATIENT
Start: 2021-07-14 | End: 2021-11-08 | Stop reason: SDUPTHER

## 2021-07-17 ENCOUNTER — APPOINTMENT (OUTPATIENT)
Dept: LAB | Facility: HOSPITAL | Age: 41
End: 2021-07-17

## 2021-07-17 DIAGNOSIS — Z00.8 ENCOUNTER FOR OTHER GENERAL EXAMINATION: ICD-10-CM

## 2021-07-17 LAB
CHOLEST SERPL-MCNC: 126 MG/DL (ref 50–200)
EST. AVERAGE GLUCOSE BLD GHB EST-MCNC: 108 MG/DL
HBA1C MFR BLD: 5.4 %
HDLC SERPL-MCNC: 27 MG/DL
LDLC SERPL CALC-MCNC: 75 MG/DL (ref 0–100)
NONHDLC SERPL-MCNC: 99 MG/DL
TRIGL SERPL-MCNC: 122 MG/DL

## 2021-07-17 PROCEDURE — 80061 LIPID PANEL: CPT

## 2021-07-17 PROCEDURE — 83036 HEMOGLOBIN GLYCOSYLATED A1C: CPT

## 2021-07-17 PROCEDURE — 36415 COLL VENOUS BLD VENIPUNCTURE: CPT

## 2021-07-28 NOTE — PROGRESS NOTES
Pulmonary Follow Up Note   Solomon Mock 30 y o  male MRN: [id number]  7/25/2018      Assessment:  31 y/o M with PMHx of JAMIE on autoPAP, HTN and ADHD on Vyvanse who presents for follow up and prescription renewal along with new CPAP Mask     JAMIE:  Patient has improved his sleep hygeine and reports much improvement in his symptoms  His Vyvanse was increased to 30 mg PO and his Provigil to 100 mg PO QD  He is feeling very well on this regimen  He reports weight loss, improved sleep at night, and improved alertness during the day  He is continuing to use his AutoPAP without any issues  He did report that his mask was causing some issues as he sleeps on his stomach and this causes the CPAP hose to become disconnected from the mask  He is interested in a MESI to see if this helps him  JAMIE (obstructive sleep apnea)  1  We will refill Provigil and Vyvanse   2  Patient will try new mask [facility] Mask   3  Continue CPAP for HS     ADHD (attention deficit hyperactivity disorder)  4  Continue Provigil and Vyvanse  Refills Provided       Plan:    Diagnoses and all orders for this visit:    JAMIE (obstructive sleep apnea)  -     VYVANSE 30 MG capsule; Take 1 capsule (30 mg total) by mouth every morning for 90 days Max Daily Amount: 30 mg  -     PAP DME Resupply/Reorder    Hypersomnia  -     VYVANSE 30 MG capsule; Take 1 capsule (30 mg total) by mouth every morning for 90 days Max Daily Amount: 30 mg  -     modafinil (PROVIGIL) 100 mg tablet; Take 1 tablet (100 mg total) by mouth daily for 90 days    Attention deficit hyperactivity disorder (ADHD), unspecified ADHD type  -     VYVANSE 30 MG capsule; Take 1 capsule (30 mg total) by mouth every morning for 90 days Max Daily Amount: 30 mg    Other orders  -     Discontinue: VYVANSE 30 MG capsule; Earliest Fill Date: 6/7/18   -     fexofenadine (ALLEGRA) 180 MG tablet; Take 180 mg by mouth daily        No Follow-up on file      History of Present Illness   HPI:  Vera Velasquez is a 30 y o  male who presents to the office today for JAMIE follow up  He has been wearing his mask and improved his sleep habits and doing well  He has been having an issue with his mask and has issues with the tube coming off the mask in the middle of the night  We will try a new mask  He is also doing well on Vyvanse and Provigil we will also order refills for this  He offers no complaints today  We reviewed his sleep journal and he has been doing well with his sleep hygiene and has been getting about 7 hours of sleep  Otherwise ROS is negative         Review of Systems   Constitutional: Negative for activity change, appetite change, chills, diaphoresis, fatigue, fever and unexpected weight change  HENT: Negative for congestion, dental problem, drooling, postnasal drip, rhinorrhea, sinus pressure and trouble swallowing  Respiratory: Negative for apnea, cough, choking, chest tightness, shortness of breath, wheezing and stridor  Cardiovascular: Negative for chest pain, palpitations and leg swelling  Gastrointestinal: Negative for abdominal distention, abdominal pain, anal bleeding, constipation, diarrhea, nausea and vomiting  Skin: Negative for rash  Allergic/Immunologic: Negative for immunocompromised state  Hematological: Negative for adenopathy  Psychiatric/Behavioral: Negative for agitation  All other systems reviewed and are negative        Historical Information   Past Medical History:   Diagnosis Date    ADHD (attention deficit hyperactivity disorder)     Hypertension      Past Surgical History:   Procedure Laterality Date    CARPAL TUNNEL RELEASE Bilateral     TONSILLECTOMY       Family History   Problem Relation Age of Onset    Hypertension Mother     Diabetes Mother     Heart attack Father     Diabetes Father     Hypertension Father          Meds/Allergies     Current Outpatient Prescriptions:     escitalopram (LEXAPRO) 10 mg tablet, Take 10 mg by mouth daily  , Disp: , Rfl:     fexofenadine (ALLEGRA) 180 MG tablet, Take 180 mg by mouth daily, Disp: , Rfl:     hydrochlorothiazide (HYDRODIURIL) 25 mg tablet, Take 25 mg by mouth daily  , Disp: , Rfl:     losartan (COZAAR) 100 MG tablet, Take 100 mg by mouth daily  , Disp: , Rfl:     metoprolol succinate (TOPROL-XL) 100 mg 24 hr tablet, Take 50 mg by mouth daily  , Disp: , Rfl:     modafinil (PROVIGIL) 100 mg tablet, Take 1 tablet (100 mg total) by mouth daily for 90 days, Disp: 90 tablet, Rfl: 3    VYVANSE 30 MG capsule, Take 1 capsule (30 mg total) by mouth every morning for 90 days Max Daily Amount: 30 mg, Disp: 90 capsule, Rfl: 0  Allergies   Allergen Reactions    Olmesartan-Amlodipine-Hctz     Terazosin        Vitals: Blood pressure 138/74, pulse 90, temperature (!) 96 8 °F (36 °C), temperature source Tympanic, resp  rate 18, height 6' 1" (1 854 m), weight (!) 152 kg (335 lb), SpO2 97 %  Body mass index is 44 2 kg/m²  Physical Exam  Physical Exam   Constitutional: He is oriented to person, place, and time  He appears well-developed and well-nourished  No distress  HENT:   Head: Normocephalic and atraumatic  Nose: Nose normal    Mouth/Throat: No oropharyngeal exudate  Eyes: EOM are normal  Pupils are equal, round, and reactive to light  No scleral icterus  Neck: Normal range of motion  Neck supple  No JVD present  No tracheal deviation present  No thyromegaly present  Cardiovascular: Normal rate, regular rhythm, normal heart sounds and intact distal pulses  Exam reveals no gallop and no friction rub  No murmur heard  Pulmonary/Chest: Effort normal and breath sounds normal  No stridor  No respiratory distress  He has no wheezes  He has no rales  He exhibits no tenderness  Abdominal: Soft  Bowel sounds are normal  He exhibits no distension and no mass  There is no tenderness  Musculoskeletal: He exhibits no edema  Lymphadenopathy:     He has no cervical adenopathy  Neurological: He is alert and oriented to person, place, and time  Skin: Skin is warm  He is not diaphoretic  Psychiatric: He has a normal mood and affect  Nursing note and vitals reviewed  Labs: I have personally reviewed pertinent lab results  Lab Results   Component Value Date    WBC 10 10 02/13/2016    WBC 10 10 02/13/2016    HGB 12 4 02/13/2016    HGB 12 4 02/13/2016    HCT 37 6 02/13/2016    HCT 37 6 02/13/2016    MCV 86 02/13/2016    MCV 86 02/13/2016     02/13/2016     02/13/2016     Lab Results   Component Value Date    GLUCOSE 177 (H) 02/13/2016    CALCIUM 8 0 (L) 02/13/2016     02/13/2016    K 4 4 02/13/2016    CO2 25 02/13/2016     02/13/2016    BUN 16 02/13/2016    CREATININE 0 59 (L) 02/13/2016     No results found for: IGE  Lab Results   Component Value Date    ALT 71 02/09/2016    AST 96 (H) 02/09/2016    ALKPHOS 60 02/09/2016    BILITOT 0 30 02/09/2016       MSLT:     Mr José Manuel Arreola underwent an MSLT while on CPAP and demonstrated a decreased sleep latency of 42 seconds across 5 naps which implies hypersomnolence  In 5 nap opportunities, there was no REM noted during any of the naps  Therefore, he does not have evidence narcolepsy  He may have hypersomnolence related to inadequate CPAP treatment, insufficient sleep or idiopathic hypersomnia  CPAP:     Mr José Manuel Arreola underwent a CPAP titration and was found to have a more optimal pressure of 10  Therefore, I recommend increasing min autoPAP pressure to 10 with max pressure of 20 using the [employer] Quattro interface  Evaluate compliance data in 1-2 months  In addition, he had a decreased sleep latency of 1 minute which implies hypersomnia  MSLT with CPAP pending  Imaging and other studies: I have personally reviewed pertinent reports  EKG, Pathology, and Other Studies: I have personally reviewed pertinent reports          Evita Byers PA-C Obesity , HTN

## 2021-08-07 DIAGNOSIS — F90.9 ATTENTION DEFICIT HYPERACTIVITY DISORDER (ADHD), UNSPECIFIED ADHD TYPE: ICD-10-CM

## 2021-08-07 RX ORDER — LISDEXAMFETAMINE DIMESYLATE 50 MG
50 CAPSULE ORAL DAILY
Qty: 30 CAPSULE | Refills: 0 | Status: SHIPPED | OUTPATIENT
Start: 2021-08-07 | End: 2021-09-07 | Stop reason: SDUPTHER

## 2021-09-07 DIAGNOSIS — F90.9 ATTENTION DEFICIT HYPERACTIVITY DISORDER (ADHD), UNSPECIFIED ADHD TYPE: ICD-10-CM

## 2021-09-07 RX ORDER — LISDEXAMFETAMINE DIMESYLATE 50 MG
50 CAPSULE ORAL DAILY
Qty: 30 CAPSULE | Refills: 0 | Status: SHIPPED | OUTPATIENT
Start: 2021-09-07 | End: 2021-10-05 | Stop reason: SDUPTHER

## 2021-09-10 ENCOUNTER — OFFICE VISIT (OUTPATIENT)
Dept: FAMILY MEDICINE CLINIC | Facility: CLINIC | Age: 41
End: 2021-09-10
Payer: COMMERCIAL

## 2021-09-10 VITALS
WEIGHT: 315 LBS | SYSTOLIC BLOOD PRESSURE: 130 MMHG | DIASTOLIC BLOOD PRESSURE: 84 MMHG | BODY MASS INDEX: 42.66 KG/M2 | HEIGHT: 72 IN | HEART RATE: 88 BPM | OXYGEN SATURATION: 97 % | RESPIRATION RATE: 14 BRPM | TEMPERATURE: 98 F

## 2021-09-10 DIAGNOSIS — Z13.6 SCREENING FOR CARDIOVASCULAR CONDITION: ICD-10-CM

## 2021-09-10 DIAGNOSIS — F42.9 OBSESSIVE-COMPULSIVE DISORDER, UNSPECIFIED TYPE: ICD-10-CM

## 2021-09-10 DIAGNOSIS — F90.0 ATTENTION DEFICIT HYPERACTIVITY DISORDER (ADHD), PREDOMINANTLY INATTENTIVE TYPE: ICD-10-CM

## 2021-09-10 DIAGNOSIS — I10 ESSENTIAL HYPERTENSION: ICD-10-CM

## 2021-09-10 DIAGNOSIS — Z00.00 ANNUAL PHYSICAL EXAM: Primary | ICD-10-CM

## 2021-09-10 DIAGNOSIS — J45.20 MILD INTERMITTENT ASTHMA WITHOUT COMPLICATION: ICD-10-CM

## 2021-09-10 DIAGNOSIS — R73.01 IMPAIRED FASTING GLUCOSE: ICD-10-CM

## 2021-09-10 DIAGNOSIS — Z23 NEED FOR PNEUMOCOCCAL VACCINATION: ICD-10-CM

## 2021-09-10 DIAGNOSIS — Z23 ENCOUNTER FOR IMMUNIZATION: ICD-10-CM

## 2021-09-10 PROCEDURE — 99395 PREV VISIT EST AGE 18-39: CPT | Performed by: FAMILY MEDICINE

## 2021-09-10 PROCEDURE — 90471 IMMUNIZATION ADMIN: CPT

## 2021-09-10 PROCEDURE — 90670 PCV13 VACCINE IM: CPT

## 2021-09-10 NOTE — PATIENT INSTRUCTIONS

## 2021-09-10 NOTE — PROGRESS NOTES
BMI Counseling: Body mass index is 42 57 kg/m²  The BMI is above normal  Nutrition recommendations include encouraging healthy choices of fruits and vegetables and moderation in carbohydrate intake  Exercise recommendations include exercising 3-5 times per week

## 2021-09-10 NOTE — PROGRESS NOTES
[address] [facility]    NAME: Jose Morales  AGE: 47 y o  SEX: male  : 1980     DATE: 2021     Assessment and Plan:     Problem List Items Addressed This Visit        Endocrine    Impaired fasting glucose    Relevant Orders    Comprehensive metabolic panel    HEMOGLOBIN A1C W/ EAG ESTIMATION       Respiratory    Asthma       Cardiovascular and Mediastinum    Hypertension       Other    ADHD (attention deficit hyperactivity disorder)    Relevant Orders    Ambulatory referral to Psychiatry      Other Visit Diagnoses     Annual physical exam    -  Primary    Joe overall appears well  He is to continue to work on a healthy, lower carb/salt diet, and regular exercise  FBW ordered  Encounter for immunization        Prevnar-13 given IM today, and tolerated well  Screening for cardiovascular condition        Relevant Orders    Lipid Panel with Direct LDL reflex    Obsessive-compulsive disorder, unspecified type        On Escitalopram   As above - referring pt to Psychiatry  Relevant Orders    Ambulatory referral to Psychiatry    Need for pneumococcal vaccination        As above  Prevnar-13 given IM today, and tolerated well  Relevant Orders    PNEUMOCOCCAL CONJUGATE VACCINE 13-VALENT GREATER THAN 6 MONTHS (Completed)          Immunizations and preventive care screenings were discussed with patient today  Appropriate education was printed on patient's after visit summary  Counseling:  Dental Health: discussed importance of regular tooth brushing, flossing, and dental visits  · Exercise: the importance of regular exercise/physical activity was discussed  Recommend exercise 3-5 times per week for at least 30 minutes  Return in 6 months (on 3/10/2022) for Recheck       Chief Complaint:     Chief Complaint   Patient presents with    Physical Exam     patient being seen for physical       History of Present Illness: Adult Annual Physical   Patient here for a comprehensive physical exam  The patient reports no problems  Labs reviewed -  HDL 27, LDL LDL 75, A1c 5 4%  He did recover from COV-19 in 12/2020; Completed the Pfizer COV-19 vaccine series in 04/2021  Tdap in 2017  Pt is treated for ADD ([place] PDMP was checked), as well as anxiety/OCD -> Joe notes that besides being treated, he continues to struggle with his OCD  Diet and Physical Activity  · Diet/Nutrition: limited junk food  · Exercise: walking  Depression Screening  PHQ-9 Depression Screening    PHQ-9:   Frequency of the following problems over the past two weeks:      Little interest or pleasure in doing things: 0 - not at all  Feeling down, depressed, or hopeless: 0 - not at all  PHQ-2 Score: 0       General Health  · Sleep: sleeps well  · Hearing: normal - bilateral   · Vision: no vision problems  · Dental: regular dental visits   Health  · History of STDs?: no      Review of Systems:     Review of Systems   Constitutional: Negative for activity change  Respiratory: Negative for shortness of breath  Cardiovascular: Negative for chest pain  Gastrointestinal: Negative for abdominal pain and blood in stool  Genitourinary: Negative for dysuria  Psychiatric/Behavioral: Positive for decreased concentration  Negative for dysphoric mood  The patient is nervous/anxious  Pt with hx of longstanding OCD        Past Medical History:     Past Medical History:   Diagnosis Date    ADHD (attention deficit hyperactivity disorder)     CPAP (continuous positive airway pressure) dependence     Hypersomnolence     Hypertension     Hypoxia     Infrapatellar bursitis of right knee     Mononucleosis     Mycobacterial infectious disease     JAMIE (obstructive sleep apnea)     Pneumonia     Right upper lobe pneumonia 10/7/2020    Varicella       Past Surgical History:     Past Surgical History:   Procedure Laterality Date    BRONCHOSCOPY  CARPAL TUNNEL RELEASE Bilateral     CIRCUMCISION      TONSILLECTOMY      WISDOM TOOTH EXTRACTION      X1       Social History:     Social History     Socioeconomic History    Marital status: /Civil Union     Spouse name: jeronimo     Number of children: 1    Years of education: None    Highest education level: None   Occupational History    None   Tobacco Use    Smoking status: Never Smoker    Smokeless tobacco: Never Used   Vaping Use    Vaping Use: Former    Substances: Flavoring   Substance and Sexual Activity    Alcohol use: Yes     Comment: rare    Drug use: No    Sexual activity: None   Other Topics Concern    None   Social History Narrative    Who lives in your home: Wife and son     What type of home do you live in: Single house    Age of your home: 1996     How long have you been living there: 2016    Type of heat: Forced hot air    Type of fuel: Oil    What type of kaur is in your bedroom: Hardwood floor    Do you have the following in or near your home:    Air products: Central air, Air , [employer] and [address]: None    Pets: Dogs (Rai Frank de Tran  and Genie)     Are pets allowed in bedroom: Yes    Open fields, wooded areas nearby: Open fields and Wooded areas    Basement: Damp and Unfinished    Exposure to second hand smoke: No        Habits:    Caffeine: coffee 1 cup daily-soda and ice tea  minimally- hot tea when he's sick     Chocolate: 1x a month     Other:     Social Determinants of Health     Financial Resource Strain:     Difficulty of Paying Living Expenses:    Food Insecurity:     Worried About Running Out of Food in the Last Year:     [address] in the Last Year:    Transportation Needs:     Lack of Transportation (Medical):      Lack of Transportation (Non-Medical):    Physical Activity: Inactive    Days of Exercise per Week: 0 days    Minutes of Exercise per Session: 0 min   Stress:     Feeling of Stress :    Social Connections:     Frequency of Communication with Friends and Family:     Frequency of Social Gatherings with Friends and Family:     Attends Sabianism Services:     Active Member of Clubs or Organizations:     Attends Club or Organization Meetings:     Marital Status:    Intimate Partner Violence:     Fear of Current or Ex-Partner:     Emotionally Abused:     Physically Abused:     Sexually Abused:       Family History:     Family History   Problem Relation Age of Onset    Hypertension Mother     Diabetes Mother         pre-diabetic     Heart attack Father     Diabetes Father     Hypertension Father     Obesity Father         hx LRYGB    Heart disease Father         hx MI age 36    Arthritis Family     Cancer Family     Cancer Paternal Grandmother         hx bladder cancer    Heart attack Paternal Grandfather     No Known Problems Son     Stroke Neg Hx     Thyroid disease Neg Hx       Current Medications:     Current Outpatient Medications   Medication Sig Dispense Refill    escitalopram (LEXAPRO) 10 mg tablet Take 1 tablet (10 mg total) by mouth daily 90 tablet 0    fexofenadine (ALLEGRA) 180 MG tablet Take 180 mg by mouth as needed        losartan (COZAAR) 100 MG tablet Take 1 tablet (100 mg total) by mouth daily 90 tablet 2    Multiple Vitamin (multivitamin) tablet Take 1 tablet by mouth daily      VITAMIN D PO Take by mouth daily      Vyvanse 50 MG capsule Take 1 capsule (50 mg total) by mouth dailyMax Daily Amount: 50 mg 30 capsule 0    hydrocortisone (ANUSOL-HC) 25 mg suppository Insert 1 suppository (25 mg total) into the rectum 2 (two) times a day as needed for hemorrhoids (Patient not taking: Reported on 10/6/2020) 12 suppository 1    Specialty Vitamins Products (magnesium, amino acid chelate,) 133 MG tablet Take 1 tablet by mouth 2 (two) times a day (Patient not taking: Reported on 9/10/2021)       No current facility-administered medications for this visit  Allergies:      Allergies Allergen Reactions    Olmesartan-Amlodipine-Hctz Edema    Terazosin Edema      Physical Exam:     /84 (BP Location: Left arm, Patient Position: Sitting, Cuff Size: Standard)   Pulse 88   Temp 98 °F (36 7 °C) (Tympanic)   Resp 14   Ht 6' 0 13" (1 832 m)   Wt (!) 143 kg (315 lb)   SpO2 97%   BMI 42 57 kg/m²     Physical Exam  Vitals and nursing note reviewed  Constitutional:       General: He is not in acute distress  Appearance: Normal appearance  He is not ill-appearing, toxic-appearing or diaphoretic  HENT:      Head: Normocephalic and atraumatic  Eyes:      General: No scleral icterus  Conjunctiva/sclera: Conjunctivae normal    Cardiovascular:      Rate and Rhythm: Normal rate and regular rhythm  Heart sounds: Normal heart sounds  No murmur heard  No friction rub  No gallop  Pulmonary:      Effort: Pulmonary effort is normal  No respiratory distress  Breath sounds: Normal breath sounds  No stridor  No wheezing, rhonchi or rales  Abdominal:      General: There is no distension  Palpations: There is no mass  Tenderness: There is no abdominal tenderness  There is no guarding or rebound  Musculoskeletal:      Cervical back: Normal range of motion and neck supple  No rigidity or tenderness  Lymphadenopathy:      Cervical: No cervical adenopathy  Neurological:      Mental Status: He is alert and oriented to person, place, and time  Psychiatric:         Mood and Affect: Mood normal          Behavior: Behavior normal          Thought Content:  Thought content normal          Judgment: Judgment normal           Bk Hardwick DO   [address] [facility]

## 2021-09-24 ENCOUNTER — TELEPHONE (OUTPATIENT)
Dept: PSYCHIATRY | Facility: CLINIC | Age: 41
End: 2021-09-24

## 2021-10-05 DIAGNOSIS — F90.9 ATTENTION DEFICIT HYPERACTIVITY DISORDER (ADHD), UNSPECIFIED ADHD TYPE: ICD-10-CM

## 2021-10-05 RX ORDER — LISDEXAMFETAMINE DIMESYLATE 50 MG
50 CAPSULE ORAL DAILY
Qty: 30 CAPSULE | Refills: 0 | Status: SHIPPED | OUTPATIENT
Start: 2021-10-05 | End: 2021-11-04 | Stop reason: SDUPTHER

## 2021-10-15 ENCOUNTER — CLINICAL SUPPORT (OUTPATIENT)
Dept: PULMONOLOGY | Facility: CLINIC | Age: 41
End: 2021-10-15
Payer: COMMERCIAL

## 2021-10-15 DIAGNOSIS — G47.33 OSA (OBSTRUCTIVE SLEEP APNEA): Primary | ICD-10-CM

## 2021-10-15 DIAGNOSIS — Z23 NEED FOR IMMUNIZATION AGAINST INFLUENZA: ICD-10-CM

## 2021-10-15 PROCEDURE — 90471 IMMUNIZATION ADMIN: CPT

## 2021-10-15 PROCEDURE — 90682 RIV4 VACC RECOMBINANT DNA IM: CPT

## 2021-11-04 DIAGNOSIS — F90.9 ATTENTION DEFICIT HYPERACTIVITY DISORDER (ADHD), UNSPECIFIED ADHD TYPE: ICD-10-CM

## 2021-11-04 RX ORDER — LISDEXAMFETAMINE DIMESYLATE 50 MG
50 CAPSULE ORAL DAILY
Qty: 30 CAPSULE | Refills: 0 | Status: SHIPPED | OUTPATIENT
Start: 2021-11-04 | End: 2021-12-06 | Stop reason: SDUPTHER

## 2021-11-05 ENCOUNTER — OFFICE VISIT (OUTPATIENT)
Dept: PULMONOLOGY | Facility: CLINIC | Age: 41
End: 2021-11-05
Payer: COMMERCIAL

## 2021-11-05 ENCOUNTER — APPOINTMENT (OUTPATIENT)
Dept: LAB | Facility: AMBULARY SURGERY CENTER | Age: 41
End: 2021-11-05
Payer: COMMERCIAL

## 2021-11-05 VITALS
WEIGHT: 315 LBS | BODY MASS INDEX: 41.75 KG/M2 | OXYGEN SATURATION: 98 % | HEART RATE: 82 BPM | SYSTOLIC BLOOD PRESSURE: 124 MMHG | TEMPERATURE: 97.2 F | HEIGHT: 73 IN | DIASTOLIC BLOOD PRESSURE: 92 MMHG

## 2021-11-05 DIAGNOSIS — E55.9 VITAMIN D DEFICIENCY: ICD-10-CM

## 2021-11-05 DIAGNOSIS — D84.9 IMMUNODEFICIENCY, UNSPECIFIED (HCC): ICD-10-CM

## 2021-11-05 DIAGNOSIS — Z13.6 SCREENING FOR CARDIOVASCULAR CONDITION: ICD-10-CM

## 2021-11-05 DIAGNOSIS — J18.9 RECURRENT PNEUMONIA: ICD-10-CM

## 2021-11-05 DIAGNOSIS — E83.42 HYPOMAGNESEMIA: Primary | ICD-10-CM

## 2021-11-05 DIAGNOSIS — D80.4 IGM DEFICIENCY (HCC): ICD-10-CM

## 2021-11-05 DIAGNOSIS — R73.01 IMPAIRED FASTING GLUCOSE: ICD-10-CM

## 2021-11-05 DIAGNOSIS — R80.9 PROTEINURIA, UNSPECIFIED TYPE: ICD-10-CM

## 2021-11-05 DIAGNOSIS — G47.61 PERIODIC LIMB MOVEMENT: ICD-10-CM

## 2021-11-05 DIAGNOSIS — G47.33 OSA (OBSTRUCTIVE SLEEP APNEA): ICD-10-CM

## 2021-11-05 LAB
25(OH)D3 SERPL-MCNC: 34.1 NG/ML (ref 30–100)
ALBUMIN SERPL BCP-MCNC: 3.7 G/DL (ref 3.5–5)
ALP SERPL-CCNC: 94 U/L (ref 46–116)
ALT SERPL W P-5'-P-CCNC: 62 U/L (ref 12–78)
ANION GAP SERPL CALCULATED.3IONS-SCNC: 3 MMOL/L (ref 4–13)
AST SERPL W P-5'-P-CCNC: 45 U/L (ref 5–45)
BACTERIA UR QL AUTO: NORMAL /HPF
BILIRUB SERPL-MCNC: 0.43 MG/DL (ref 0.2–1)
BILIRUB UR QL STRIP: NEGATIVE
BUN SERPL-MCNC: 16 MG/DL (ref 5–25)
CALCIUM SERPL-MCNC: 9 MG/DL (ref 8.3–10.1)
CHLORIDE SERPL-SCNC: 106 MMOL/L (ref 100–108)
CHOLEST SERPL-MCNC: 134 MG/DL (ref 50–200)
CLARITY UR: CLEAR
CO2 SERPL-SCNC: 28 MMOL/L (ref 21–32)
COLOR UR: YELLOW
CREAT SERPL-MCNC: 0.73 MG/DL (ref 0.6–1.3)
EST. AVERAGE GLUCOSE BLD GHB EST-MCNC: 117 MG/DL
GFR SERPL CREATININE-BSD FRML MDRD: 118 ML/MIN/1.73SQ M
GLUCOSE P FAST SERPL-MCNC: 97 MG/DL (ref 65–99)
GLUCOSE UR STRIP-MCNC: NEGATIVE MG/DL
HBA1C MFR BLD: 5.7 %
HDLC SERPL-MCNC: 31 MG/DL
HGB UR QL STRIP.AUTO: NEGATIVE
HYALINE CASTS #/AREA URNS LPF: NORMAL /LPF
IGA SERPL-MCNC: 331 MG/DL (ref 70–400)
IGG SERPL-MCNC: 1090 MG/DL (ref 700–1600)
IGM SERPL-MCNC: 72 MG/DL (ref 40–230)
KETONES UR STRIP-MCNC: NEGATIVE MG/DL
LDLC SERPL CALC-MCNC: 80 MG/DL (ref 0–100)
LEUKOCYTE ESTERASE UR QL STRIP: NEGATIVE
NITRITE UR QL STRIP: NEGATIVE
NON-SQ EPI CELLS URNS QL MICRO: NORMAL /HPF
PH UR STRIP.AUTO: 6 [PH]
POTASSIUM SERPL-SCNC: 4.2 MMOL/L (ref 3.5–5.3)
PROT SERPL-MCNC: 7.8 G/DL (ref 6.4–8.2)
PROT UR STRIP-MCNC: ABNORMAL MG/DL
RBC #/AREA URNS AUTO: NORMAL /HPF
SODIUM SERPL-SCNC: 137 MMOL/L (ref 136–145)
SP GR UR STRIP.AUTO: 1.03 (ref 1–1.03)
TRIGL SERPL-MCNC: 115 MG/DL
UROBILINOGEN UR QL STRIP.AUTO: 0.2 E.U./DL
WBC #/AREA URNS AUTO: NORMAL /HPF

## 2021-11-05 PROCEDURE — 36415 COLL VENOUS BLD VENIPUNCTURE: CPT

## 2021-11-05 PROCEDURE — 82306 VITAMIN D 25 HYDROXY: CPT

## 2021-11-05 PROCEDURE — 81001 URINALYSIS AUTO W/SCOPE: CPT | Performed by: FAMILY MEDICINE

## 2021-11-05 PROCEDURE — 82784 ASSAY IGA/IGD/IGG/IGM EACH: CPT

## 2021-11-05 PROCEDURE — 80053 COMPREHEN METABOLIC PANEL: CPT

## 2021-11-05 PROCEDURE — 83036 HEMOGLOBIN GLYCOSYLATED A1C: CPT

## 2021-11-05 PROCEDURE — 86609 BACTERIUM ANTIBODY: CPT

## 2021-11-05 PROCEDURE — 80061 LIPID PANEL: CPT

## 2021-11-05 PROCEDURE — 99214 OFFICE O/P EST MOD 30 MIN: CPT | Performed by: INTERNAL MEDICINE

## 2021-11-08 ENCOUNTER — TELEPHONE (OUTPATIENT)
Dept: FAMILY MEDICINE CLINIC | Facility: CLINIC | Age: 41
End: 2021-11-08

## 2021-11-08 DIAGNOSIS — F41.9 ANXIETY AND DEPRESSION: ICD-10-CM

## 2021-11-08 DIAGNOSIS — I10 ESSENTIAL HYPERTENSION: ICD-10-CM

## 2021-11-08 DIAGNOSIS — F32.A ANXIETY AND DEPRESSION: ICD-10-CM

## 2021-11-08 RX ORDER — LOSARTAN POTASSIUM 100 MG/1
100 TABLET ORAL DAILY
Qty: 90 TABLET | Refills: 2 | Status: SHIPPED | OUTPATIENT
Start: 2021-11-08 | End: 2022-05-08 | Stop reason: SDUPTHER

## 2021-11-08 RX ORDER — ESCITALOPRAM OXALATE 10 MG/1
10 TABLET ORAL DAILY
Qty: 90 TABLET | Refills: 0 | Status: SHIPPED | OUTPATIENT
Start: 2021-11-08 | End: 2022-02-03 | Stop reason: SDUPTHER

## 2021-11-19 LAB
DEPRECATED S PNEUM14 IGG SER-MCNC: 1.9 UG/ML
DEPRECATED S PNEUM19 IGG SER-MCNC: 14.6 UG/ML
DEPRECATED S PNEUM23 IGG SER-MCNC: 4.2 UG/ML
DEPRECATED S PNEUM3 IGG SER-MCNC: 6.1 UG/ML
DEPRECATED S PNEUM4 IGG SER-MCNC: 4.4 UG/ML
DEPRECATED S PNEUM8 AB SER-MCNC: 3.6 UG/ML
DEPRECATED S PNEUM9 IGG SER-MCNC: 0.5 UG/ML
FLUBV RNA SPEC QL NAA+PROBE: 0.2 UG/ML
S PNEUM DA 18C IGG SER-MCNC: 0.2 UG/ML
S PNEUM DA 19A IGG SER-MCNC: 11.8 UG/ML
S PNEUM DA 6B IGG SER-MCNC: 6.4 UG/ML
STREP PNEUMO TYPE 1: 3.2 UG/ML
STREP PNEUMO TYPE 51: 3.4 UG/ML
STREP PNEUMO TYPE 68: 2.3 UG/ML

## 2021-12-06 DIAGNOSIS — F90.9 ATTENTION DEFICIT HYPERACTIVITY DISORDER (ADHD), UNSPECIFIED ADHD TYPE: ICD-10-CM

## 2021-12-06 RX ORDER — LISDEXAMFETAMINE DIMESYLATE 50 MG
50 CAPSULE ORAL DAILY
Qty: 30 CAPSULE | Refills: 0 | Status: SHIPPED | OUTPATIENT
Start: 2021-12-06 | End: 2022-01-03 | Stop reason: SDUPTHER

## 2021-12-13 ENCOUNTER — HOSPITAL ENCOUNTER (OUTPATIENT)
Dept: RADIOLOGY | Facility: HOSPITAL | Age: 41
Discharge: HOME | End: 2021-12-13
Payer: COMMERCIAL

## 2021-12-13 ENCOUNTER — APPOINTMENT (OUTPATIENT)
Dept: LAB | Facility: HOSPITAL | Age: 41
End: 2021-12-13
Payer: COMMERCIAL

## 2021-12-13 DIAGNOSIS — R25.2 LEG CRAMPING: ICD-10-CM

## 2021-12-13 PROCEDURE — 93923 UPR/LXTR ART STDY 3+ LVLS: CPT

## 2021-12-13 PROCEDURE — 93923 UPR/LXTR ART STDY 3+ LVLS: CPT | Performed by: SURGERY

## 2021-12-15 ENCOUNTER — TELEPHONE (OUTPATIENT)
Dept: PULMONOLOGY | Facility: CLINIC | Age: 41
End: 2021-12-15

## 2021-12-23 ENCOUNTER — TELEPHONE (OUTPATIENT)
Dept: SLEEP CENTER | Facility: CLINIC | Age: 41
End: 2021-12-23

## 2022-01-03 DIAGNOSIS — F90.9 ATTENTION DEFICIT HYPERACTIVITY DISORDER (ADHD), UNSPECIFIED ADHD TYPE: ICD-10-CM

## 2022-01-03 RX ORDER — LISDEXAMFETAMINE DIMESYLATE 50 MG
50 CAPSULE ORAL DAILY
Qty: 30 CAPSULE | Refills: 0 | Status: SHIPPED | OUTPATIENT
Start: 2022-01-03 | End: 2022-01-10 | Stop reason: SDUPTHER

## 2022-01-10 DIAGNOSIS — F90.9 ATTENTION DEFICIT HYPERACTIVITY DISORDER (ADHD), UNSPECIFIED ADHD TYPE: ICD-10-CM

## 2022-01-10 RX ORDER — LISDEXAMFETAMINE DIMESYLATE 50 MG
50 CAPSULE ORAL DAILY
Qty: 30 CAPSULE | Refills: 0 | Status: SHIPPED | OUTPATIENT
Start: 2022-01-10 | End: 2022-01-12 | Stop reason: SDUPTHER

## 2022-01-12 DIAGNOSIS — F90.9 ATTENTION DEFICIT HYPERACTIVITY DISORDER (ADHD), UNSPECIFIED ADHD TYPE: ICD-10-CM

## 2022-01-12 RX ORDER — LISDEXAMFETAMINE DIMESYLATE 50 MG
50 CAPSULE ORAL DAILY
Qty: 30 CAPSULE | Refills: 0 | Status: SHIPPED | OUTPATIENT
Start: 2022-01-12 | End: 2022-02-07 | Stop reason: SDUPTHER

## 2022-02-03 DIAGNOSIS — F32.A ANXIETY AND DEPRESSION: ICD-10-CM

## 2022-02-03 DIAGNOSIS — F41.9 ANXIETY AND DEPRESSION: ICD-10-CM

## 2022-02-03 RX ORDER — ESCITALOPRAM OXALATE 10 MG/1
10 TABLET ORAL DAILY
Qty: 90 TABLET | Refills: 0 | Status: SHIPPED | OUTPATIENT
Start: 2022-02-03 | End: 2022-05-08 | Stop reason: SDUPTHER

## 2022-02-07 ENCOUNTER — TELEMEDICINE (OUTPATIENT)
Dept: PULMONOLOGY | Facility: CLINIC | Age: 42
End: 2022-02-07
Payer: COMMERCIAL

## 2022-02-07 VITALS — HEIGHT: 73 IN | WEIGHT: 222 LBS | BODY MASS INDEX: 29.42 KG/M2

## 2022-02-07 DIAGNOSIS — F90.9 ATTENTION DEFICIT HYPERACTIVITY DISORDER (ADHD), UNSPECIFIED ADHD TYPE: ICD-10-CM

## 2022-02-07 PROCEDURE — 99214 OFFICE O/P EST MOD 30 MIN: CPT | Performed by: INTERNAL MEDICINE

## 2022-02-07 PROCEDURE — 3008F BODY MASS INDEX DOCD: CPT | Performed by: INTERNAL MEDICINE

## 2022-02-07 PROCEDURE — 1036F TOBACCO NON-USER: CPT | Performed by: INTERNAL MEDICINE

## 2022-02-07 RX ORDER — LISDEXAMFETAMINE DIMESYLATE 50 MG
50 CAPSULE ORAL DAILY
Qty: 30 CAPSULE | Refills: 0 | Status: SHIPPED | OUTPATIENT
Start: 2022-02-07 | End: 2022-03-10

## 2022-02-07 NOTE — LETTER
February 7, 2022     [address] [facility], [address] [facility] [place] [address]  [address] [place] [address]    Patient: Omid Adams   YOB: 1980   Date of Visit: 2/7/2022       Dear Dr Mcclellan Bronadirure:    Thank you for referring Omid Adams to me for evaluation  Below are my notes for this consultation  If you have questions, please do not hesitate to call me  I look forward to following your patient along with you  Sincerely,        Betzaidacelio Ervin, DO        CC: No Recipients  Betzaida Ervin, DO  2/7/2022  2:11 PM  Sign when Signing Visit  Virtual Regular Visit    Verification of patient location:    Patient is located in the following state in which I hold an active license [place]      Assessment/Plan:  40 y/o M with PMHx of JAMIE on autoPAP, HTN and ADHD on Vyvanse who comes in for follow up of JAMIE  1    JAMIE previously on autoCPAP 7-15 but had difficulty with tolerance  No he is on autoBIPAP with good compliance and clinical response  Residual AHI - 1 8       -  I encouraged him to be a bit more consistent with use  -  Continue autoBIPAP with min EPAP of 8, PSV of 4, IPAP 25  Follow compliance in 6 months         -  He will attempt a different size F30i interface to see if it prevents mask leak  2   Excessive daytime sleepiness despite CPAP  Hx of ADHD      -  Management of JAMIE as below      -  Continue Vyvanse 50mg PO Daily for ADHD and sleepiness at current dose for now  Rx written      -  I discussed the possibility of weaning down the dose given clinical improvement with BIPAP  He previously required metoprolol        3    Periodic limb movement (PLM index - 24)  Anjana Plume was normal but mildly reduced magnesium       -  Treat JAMIE as above       -  We did discuss different pharmacologic therapies in the past but he would like to hold on therapy for now               Reason for visit is JAMIE follow up      Chief Complaint   Patient presents with    Virtual Regular Visit Encounter provider Sekou Estrada DO    Provider located at [place] [id number] [facility] [id number]      Recent Visits  No visits were found meeting these conditions  Showing recent visits within past 7 days and meeting all other requirements  Today's Visits  Date Type Provider Dept   02/07/22 Telemedicine Sekou Estrada DO [facility]   Showing today's visits and meeting all other requirements  Future Appointments  No visits were found meeting these conditions  Showing future appointments within next 150 days and meeting all other requirements       The patient was identified by name and date of birth  Kika Carr was informed that this is a telemedicine visit and that the visit is being conducted through [address] [place] [address] Now and patient was informed that this is a secure, HIPAA-compliant platform  He agrees to proceed     My office door was closed  No one else was in the room  He acknowledged consent and understanding of privacy and security of the video platform  The patient has agreed to participate and understands they can discontinue the visit at any time  Patient is aware this is a billable service  Subjective  Kika Carr is a 46 y o  male who calls in for follow up for his JAMIE  HPI   Mr Unruly Dee has noted clinical improvement with switch to autoBIPAP instead  He states that he feels more refreshed on BIPAP  He feels more refreshed when he wakes up  He denies morning headache or dry mouth  He does note a bit of leak at night on occasion and he mentioned that with increasing humidity on the machine he has noted condensation in the mask  He continues to take vyvanse and feels well with it  He does admit his sleep hygiene has been problematic overall        Past Medical History:   Diagnosis Date    ADHD (attention deficit hyperactivity disorder)     CPAP (continuous positive airway pressure) dependence     Hypersomnolence     Hypertension     Hypoxia     Infrapatellar bursitis of right knee     Mononucleosis     Mycobacterial infectious disease     JAMIE (obstructive sleep apnea)     Pneumonia     Right upper lobe pneumonia 10/7/2020    Varicella        Past Surgical History:   Procedure Laterality Date    BRONCHOSCOPY      CARPAL TUNNEL RELEASE Bilateral     CIRCUMCISION      TONSILLECTOMY      WISDOM TOOTH EXTRACTION      X1        Current Outpatient Medications   Medication Sig Dispense Refill    escitalopram (LEXAPRO) 10 mg tablet Take 1 tablet (10 mg total) by mouth daily 90 tablet 0    fexofenadine (ALLEGRA) 180 MG tablet Take 180 mg by mouth as needed        losartan (COZAAR) 100 MG tablet Take 1 tablet (100 mg total) by mouth daily 90 tablet 2    VITAMIN D PO Take by mouth daily      Vyvanse 50 MG capsule Take 1 capsule (50 mg total) by mouth daily Max Daily Amount: 50 mg 30 capsule 0    hydrocortisone (ANUSOL-HC) 25 mg suppository Insert 1 suppository (25 mg total) into the rectum 2 (two) times a day as needed for hemorrhoids (Patient not taking: Reported on 10/6/2020) 12 suppository 1    Multiple Vitamin (multivitamin) tablet Take 1 tablet by mouth daily      Specialty Vitamins Products (magnesium, amino acid chelate,) 133 MG tablet Take 1 tablet by mouth 2 (two) times a day (Patient not taking: Reported on 9/10/2021)       No current facility-administered medications for this visit  Allergies   Allergen Reactions    Olmesartan-Amlodipine-Hctz Edema    Terazosin Edema       Review of Systems   Constitutional: Positive for fatigue  Negative for chills and unexpected weight change  HENT: Negative  Eyes: Negative  Respiratory: Negative  Cardiovascular: Negative  Gastrointestinal: Negative  Genitourinary: Negative  Musculoskeletal: Negative  Skin: Negative  Allergic/Immunologic: Negative  Neurological: Negative  Psychiatric/Behavioral: Negative  Video Exam    Vitals:    02/07/22 0839   Weight: 101 kg (222 lb)   Height: 6' 1" (1 854 m)       Physical Exam   General: Pleasant, Awake alert and oriented x 3, conversant without conversational dyspnea, NAD, normalaffect  HEENT:  PERRL, Sclera noninjected, nonicteric OU, Nares patent,  no craniofacial abnormalities, Mucous membranes, moist, no oral lesions, normal dentition  NECK: Trachea midline, no accessory muscle use, no stridor  CARDIAC, PULM and ABD: Could not be performed on video visit  NEURO: no focal neurologic deficits, AAOx3, moving all extremities appropriately     Lab Results   Component Value Date    WBC 6 22 11/07/2020    HGB 14 0 11/07/2020    HCT 43 4 11/07/2020    MCV 86 11/07/2020     11/07/2020     Lab Results   Component Value Date    SODIUM 137 11/05/2021    K 4 2 11/05/2021     11/05/2021    CO2 28 11/05/2021    BUN 16 11/05/2021    CREATININE 0 73 11/05/2021    GLUC 102 10/09/2020    CALCIUM 9 0 11/05/2021     Sleep studies:  CPAP titration study 8/19/20  Mr Ramesh Stoner underwent titration of CPAP starting at 9 cc of H2O pressure  He demonstrated a normal sleep  latency and delayed REM latency arguing against hypersomnia  Sleep staging demonstrated increased REM sleep  His respiratory events were well controlled  He had a few events on CPAP 9 so he was titrated up to 10 cc of H2O  pressure  He did well at that pressure  Therefore, I recommend a switch to autoCPAP 10-20 cc of H2O pressure  with heated humidification  TCO2 monitoring was performed, while awake his TCO2 was 42- 44  It increased  during REM sleep to 51- 53  He only had a brief episode of hypoxia but was otherwise well controlled  He did demonstrate frequent limb movements (PLM index - 29 4)  I would check iron and magnesium levels  I would consider a trial of Neurontin, Lyrica, Mirapex or Requip      Compliance Data:  Type of CPAP:  autoPAP 10-20, mean pressure 11, max 15                                   Percent usage: 100%                                   Average time used: 5 hrs 24 mins                                   Residual AHI: 1 8     Compliance Data:  10/5/21 - 11/3/21                                  Type of CPAP:  autoPAP 10-20, mean 10 7, max 13 5                                   Average time used: 5 hrs 27 mins                                   Residual AHI: 1 8    New Compliance Data: 1/8/22 - 2/6/22                                    Type of CPAP:  autoBIPAP with min EPAP 8, PSV 4, IPAP 25                                   Percent usage: 93%, 73% for > 4 hrs                                   Average time used: 5 hrs 11 minutes                                   Residual AHI: 1 8                                       I spent 21 minutes directly with the patient during this visit    VIRTUAL VISIT DISCLAIMER      Kika Carr verbally agrees to participate in [employer]  Pt is aware that [employer] could be limited without vital signs or the ability to perform a full hands-on physical Ajit Ernandez understands he or the provider may request at any time to terminate the video visit and request the patient to seek care or treatment in person

## 2022-02-07 NOTE — PROGRESS NOTES
Virtual Regular Visit    Verification of patient location:    Patient is located in the following state in which I hold an active license [place]      Assessment/Plan:  40 y/o M with PMHx of JAMIE on autoPAP, HTN and ADHD on Vyvanse who comes in for follow up of JAMIE  1    JAMIE previously on autoCPAP 7-15 but had difficulty with tolerance  No he is on autoBIPAP with good compliance and clinical response  Residual AHI - 1 8       -  I encouraged him to be a bit more consistent with use  -  Continue autoBIPAP with min EPAP of 8, PSV of 4, IPAP 25  Follow compliance in 6 months         -  He will attempt a different size F30i interface to see if it prevents mask leak  2   Excessive daytime sleepiness despite CPAP  Hx of ADHD      -  Management of JAMIE as below      -  Continue Vyvanse 50mg PO Daily for ADHD and sleepiness at current dose for now  Rx written      -  I discussed the possibility of weaning down the dose given clinical improvement with BIPAP  He previously required metoprolol        3    Periodic limb movement (PLM index - 24)  Herbert Barges was normal but mildly reduced magnesium       -  Treat JAMIE as above       -  We did discuss different pharmacologic therapies in the past but he would like to hold on therapy for now               Reason for visit is JAMIE follow up  Chief Complaint   Patient presents with    Virtual Regular Visit        Encounter provider Casey Mckenzie DO    Provider located at [place] [phone]      Recent Visits  No visits were found meeting these conditions    Showing recent visits within past 7 days and meeting all other requirements  Today's Visits  Date Type Provider Dept   02/07/22 Telemedicine Casey Mckenzie DO [facility]   Showing today's visits and meeting all other requirements  Future Appointments  No visits were found meeting these conditions  Showing future appointments within next 150 days and meeting all other requirements       The patient was identified by name and date of birth  Roseanne Saldaña was informed that this is a telemedicine visit and that the visit is being conducted through [address] Now and patient was informed that this is a secure, HIPAA-compliant platform  He agrees to proceed     My office door was closed  No one else was in the room  He acknowledged consent and understanding of privacy and security of the video platform  The patient has agreed to participate and understands they can discontinue the visit at any time  Patient is aware this is a billable service  Subjective  Roseanne Saldaña is a 46 y o  male who calls in for follow up for his JAMIE  HPI   Mr  Sheila Ghosh has noted clinical improvement with switch to autoBIPAP instead  He states that he feels more refreshed on BIPAP  He feels more refreshed when he wakes up  He denies morning headache or dry mouth  He does note a bit of leak at night on occasion and he mentioned that with increasing humidity on the machine he has noted condensation in the mask  He continues to take vyvanse and feels well with it  He does admit his sleep hygiene has been problematic overall        Past Medical History:   Diagnosis Date    ADHD (attention deficit hyperactivity disorder)     CPAP (continuous positive airway pressure) dependence     Hypersomnolence     Hypertension     Hypoxia     Infrapatellar bursitis of right knee     Mononucleosis     Mycobacterial infectious disease     JAMIE (obstructive sleep apnea)     Pneumonia     Right upper lobe pneumonia 10/7/2020    Varicella        Past Surgical History:   Procedure Laterality Date    BRONCHOSCOPY      CARPAL TUNNEL RELEASE Bilateral     CIRCUMCISION      TONSILLECTOMY      WISDOM TOOTH EXTRACTION      X1        Current Outpatient Medications   Medication Sig Dispense Refill    escitalopram (Tailta Kerns) 10 mg tablet Take 1 tablet (10 mg total) by mouth daily 90 tablet 0    fexofenadine (ALLEGRA) 180 MG tablet Take 180 mg by mouth as needed        losartan (COZAAR) 100 MG tablet Take 1 tablet (100 mg total) by mouth daily 90 tablet 2    VITAMIN D PO Take by mouth daily      Vyvanse 50 MG capsule Take 1 capsule (50 mg total) by mouth daily Max Daily Amount: 50 mg 30 capsule 0    hydrocortisone (ANUSOL-HC) 25 mg suppository Insert 1 suppository (25 mg total) into the rectum 2 (two) times a day as needed for hemorrhoids (Patient not taking: Reported on 10/6/2020) 12 suppository 1    Multiple Vitamin (multivitamin) tablet Take 1 tablet by mouth daily      Specialty Vitamins Products (magnesium, amino acid chelate,) 133 MG tablet Take 1 tablet by mouth 2 (two) times a day (Patient not taking: Reported on 9/10/2021)       No current facility-administered medications for this visit  Allergies   Allergen Reactions    Olmesartan-Amlodipine-Hctz Edema    Terazosin Edema       Review of Systems   Constitutional: Positive for fatigue  Negative for chills and unexpected weight change  HENT: Negative  Eyes: Negative  Respiratory: Negative  Cardiovascular: Negative  Gastrointestinal: Negative  Genitourinary: Negative  Musculoskeletal: Negative  Skin: Negative  Allergic/Immunologic: Negative  Neurological: Negative  Psychiatric/Behavioral: Negative  Video Exam    Vitals:    02/07/22 0839   Weight: 101 kg (222 lb)   Height: 6' 1" (1 854 m)       Physical Exam   General: Pleasant, Awake alert and oriented x 3, conversant without conversational dyspnea, NAD, normalaffect  HEENT:  PERRL, Sclera noninjected, nonicteric OU, Nares patent,  no craniofacial abnormalities, Mucous membranes, moist, no oral lesions, normal dentition  NECK: Trachea midline, no accessory muscle use, no stridor  CARDIAC, PULM and ABD: Could not be performed on video visit      NEURO: no focal neurologic deficits, AAOx3, moving all extremities appropriately     Lab Results   Component Value Date    WBC 6 22 11/07/2020    HGB 14 0 11/07/2020    HCT 43 4 11/07/2020    MCV 86 11/07/2020     11/07/2020     Lab Results   Component Value Date    SODIUM 137 11/05/2021    K 4 2 11/05/2021     11/05/2021    CO2 28 11/05/2021    BUN 16 11/05/2021    CREATININE 0 73 11/05/2021    GLUC 102 10/09/2020    CALCIUM 9 0 11/05/2021     Sleep studies:  CPAP titration study 8/19/20  Mr Sheila Ghosh underwent titration of CPAP starting at 9 cc of H2O pressure  He demonstrated a normal sleep  latency and delayed REM latency arguing against hypersomnia  Sleep staging demonstrated increased REM sleep  His respiratory events were well controlled  He had a few events on CPAP 9 so he was titrated up to 10 cc of H2O  pressure  He did well at that pressure  Therefore, I recommend a switch to autoCPAP 10-20 cc of H2O pressure  with heated humidification  TCO2 monitoring was performed, while awake his TCO2 was 42- 44  It increased  during REM sleep to 51- 53  He only had a brief episode of hypoxia but was otherwise well controlled  He did demonstrate frequent limb movements (PLM index - 29 4)  I would check iron and magnesium levels  I would consider a trial of Neurontin, Lyrica, Mirapex or Requip      Compliance Data:  Type of CPAP:  autoPAP 10-20, mean pressure 11, max 15                                   Percent usage: 100%                                   Average time used: 5 hrs 24 mins                                   Residual AHI: 1 8     Compliance Data:  10/5/21 - 11/3/21                                  Type of CPAP:  autoPAP 10-20, mean 10 7, max 13 5                                   Average time used: 5 hrs 27 mins                                   Residual AHI: 1 8    New Compliance Data: 1/8/22 - 2/6/22                                    Type of CPAP:  autoBIPAP with min EPAP 8, PSV 4, IPAP 25 Percent usage: 93%, 73% for > 4 hrs                                   Average time used: 5 hrs 11 minutes                                   Residual AHI: 1 8                                       I spent 21 minutes directly with the patient during this visit    VIRTUAL VISIT DISCLAIMER      Roseanne Saldaña verbally agrees to participate in [facility]  Pt is aware that [facility] could be limited without vital signs or the ability to perform a full hands-on physical Jami Rosales understands he or the provider may request at any time to terminate the video visit and request the patient to seek care or treatment in person

## 2022-02-10 DIAGNOSIS — G47.33 OSA (OBSTRUCTIVE SLEEP APNEA): Primary | ICD-10-CM

## 2022-03-07 ENCOUNTER — OFFICE VISIT (OUTPATIENT)
Dept: FAMILY MEDICINE CLINIC | Facility: CLINIC | Age: 42
End: 2022-03-07
Payer: COMMERCIAL

## 2022-03-07 VITALS
SYSTOLIC BLOOD PRESSURE: 140 MMHG | WEIGHT: 315 LBS | TEMPERATURE: 97.7 F | OXYGEN SATURATION: 98 % | RESPIRATION RATE: 14 BRPM | DIASTOLIC BLOOD PRESSURE: 80 MMHG | HEIGHT: 73 IN | BODY MASS INDEX: 41.75 KG/M2 | HEART RATE: 92 BPM

## 2022-03-07 DIAGNOSIS — F42.2 MIXED OBSESSIONAL THOUGHTS AND ACTS: ICD-10-CM

## 2022-03-07 DIAGNOSIS — I10 PRIMARY HYPERTENSION: ICD-10-CM

## 2022-03-07 DIAGNOSIS — R73.01 IMPAIRED FASTING GLUCOSE: Primary | ICD-10-CM

## 2022-03-07 DIAGNOSIS — Z13.6 SCREENING FOR CARDIOVASCULAR CONDITION: ICD-10-CM

## 2022-03-07 DIAGNOSIS — F90.0 ATTENTION DEFICIT HYPERACTIVITY DISORDER (ADHD), PREDOMINANTLY INATTENTIVE TYPE: ICD-10-CM

## 2022-03-07 DIAGNOSIS — G47.33 OSA (OBSTRUCTIVE SLEEP APNEA): ICD-10-CM

## 2022-03-07 PROCEDURE — 3079F DIAST BP 80-89 MM HG: CPT | Performed by: FAMILY MEDICINE

## 2022-03-07 PROCEDURE — 3008F BODY MASS INDEX DOCD: CPT | Performed by: FAMILY MEDICINE

## 2022-03-07 PROCEDURE — 99214 OFFICE O/P EST MOD 30 MIN: CPT | Performed by: FAMILY MEDICINE

## 2022-03-07 PROCEDURE — 1036F TOBACCO NON-USER: CPT | Performed by: FAMILY MEDICINE

## 2022-03-07 PROCEDURE — 3077F SYST BP >= 140 MM HG: CPT | Performed by: FAMILY MEDICINE

## 2022-03-07 NOTE — PROGRESS NOTES
FAMILY PRACTICE OFFICE VISIT       NAME: Ileana Mullins  AGE: 46 y o  SEX: male       : 1980        MRN: [id number]    DATE: 3/7/2022  TIME: 7:42 PM    Assessment and Plan   1  Impaired fasting glucose  Comments:  Jamari Mcclain is stable on exam - he is to continue a lower carb/salt diet, & to get regular exercise  FBW ordered, with A1c incl - to be done prior to next OV  Orders:  -     Comprehensive metabolic panel; Future  -     HEMOGLOBIN A1C W/ EAG ESTIMATION; Future    2  Primary hypertension  Comments:  Reasonable control on present management  3  JAMIE (obstructive sleep apnea)  Comments:  Stable - on BiPAP  Continues to work with Pulmonology/Sleep Med     4  Attention deficit hyperactivity disorder (ADHD), predominantly inattentive type  Comments:  Ongoing; on Vyvanse  [place]MP [place]  Orders:  -     Ambulatory Referral to Psychiatry; Future    5  Mixed obsessional thoughts and acts  Comments: On Escitalopram, but continues to stuggle  Pt referred again to Psychiatry today  Orders:  -     Ambulatory Referral to Psychiatry; Future    6  Screening for cardiovascular condition  -     Lipid Panel with Direct LDL reflex; Future         There are no Patient Instructions on file for this visit  Chief Complaint     Chief Complaint   Patient presents with    Follow-up     patient being seen for 6 month follow up        History of Present Illness   Ileana Mullins is a 46y o -year-old male who presents in f/u today  Continues to work with Pulmonology / Sleep Med  Never ended up seeing Psychiatry after we referred him at his last OV  Last labs from 2021 - A1c at 5 7%, LDL 80  Review of Systems   Review of Systems   Constitutional: Negative for activity change  HENT:        No snoring  Respiratory: Negative for shortness of breath  Cardiovascular: Negative for chest pain  Psychiatric/Behavioral: Positive for decreased concentration   Negative for dysphoric mood, sleep disturbance and suicidal ideas  The patient is nervous/anxious  No HI/SI         Active Problem List     Patient Active Problem List   Diagnosis    Pneumonia    ADHD (attention deficit hyperactivity disorder)    Hypertension    Morbid obesity with BMI of 40 0-44 9, adult (HCC)    Impaired fasting glucose    Asthma    JAMIE (obstructive sleep apnea)    Excessive daytime sleepiness    Hypersomnolence    Infrapatellar bursitis of right knee    Influenza    Chronic fatigue    Right upper lobe pneumonia    Positive blood culture    Periodic limb movement    COVID-19         Past Medical History:  Past Medical History:   Diagnosis Date    ADHD (attention deficit hyperactivity disorder)     CPAP (continuous positive airway pressure) dependence     Hypersomnolence     Hypertension     Hypoxia     Infrapatellar bursitis of right knee     Mononucleosis     Mycobacterial infectious disease     JAMIE (obstructive sleep apnea)     Pneumonia     Right upper lobe pneumonia 10/7/2020    Varicella        Past Surgical History:  Past Surgical History:   Procedure Laterality Date    BRONCHOSCOPY      CARPAL TUNNEL RELEASE Bilateral     CIRCUMCISION      TONSILLECTOMY      WISDOM TOOTH EXTRACTION      X1        Family History:  Family History   Problem Relation Age of Onset    Hypertension Mother     Diabetes Mother         pre-diabetic     Heart attack Father     Diabetes Father     Hypertension Father     Obesity Father         hx LRYGB    Heart disease Father         hx MI age 36   Quin Pall Arthritis Family     Cancer Family     Cancer Paternal Grandmother         hx bladder cancer    Heart attack Paternal Grandfather     No Known Problems Son     Stroke Neg Hx     Thyroid disease Neg Hx        Social History:  Social History     Socioeconomic History    Marital status: /Civil Union     Spouse name: jeronimo     Number of children: 1    Years of education: Not on file    Highest education level: Not on file   Occupational History    Not on file   Tobacco Use    Smoking status: Never Smoker    Smokeless tobacco: Never Used   Vaping Use    Vaping Use: Former    Substances: Flavoring   Substance and Sexual Activity    Alcohol use: Yes     Comment: rare    Drug use: No    Sexual activity: Yes     Partners: Female   Other Topics Concern    Not on file   Social History Narrative    Who lives in your home: Wife and son     What type of home do you live in: Single house    Age of your home: 1996     How long have you been living there: 2016    Type of heat: Forced hot air    Type of fuel: Oil    What type of kaur is in your bedroom: Hardwood floor    Do you have the following in or near your home:    Air products: Central air, Air , [employer] and Dehumidifier    Pests: None    Pets: Dogs ([address]  and [place])     Are pets allowed in bedroom: Yes    Open fields, wooded areas nearby: Open fields and Wooded areas    Basement: Damp and Unfinished    Exposure to second hand smoke: No        Habits:    Caffeine: coffee 1 cup daily-soda and ice tea  minimally- hot tea when he's sick     Chocolate: 1x a month     Other:     Social Determinants of Health     Financial Resource Strain: Not on file   Food Insecurity: Not on file   Transportation Needs: Not on file   Physical Activity: Not on file   Stress: Not on file   Social Connections: Not on file   Intimate Partner Violence: Not on file   Housing Stability: Not on file       Objective     Vitals:    03/07/22 1825   BP: 140/80   Pulse: 92   Resp: 14   Temp: 97 7 °F (36 5 °C)   SpO2: 98%     Wt Readings from Last 3 Encounters:   03/07/22 (!) 149 kg (327 lb 6 4 oz)   02/07/22 101 kg (222 lb)   11/05/21 (!) 145 kg (319 lb)       Physical Exam  Vitals and nursing note reviewed  Constitutional:       General: He is not in acute distress  Appearance: Normal appearance  He is not ill-appearing, toxic-appearing or diaphoretic     HENT:      Head: Normocephalic and atraumatic  Eyes:      General: No scleral icterus  Conjunctiva/sclera: Conjunctivae normal    Cardiovascular:      Rate and Rhythm: Normal rate and regular rhythm  Heart sounds: Normal heart sounds  No murmur heard  No friction rub  No gallop  Pulmonary:      Effort: Pulmonary effort is normal  No respiratory distress  Breath sounds: Normal breath sounds  No stridor  No wheezing, rhonchi or rales  Musculoskeletal:      Cervical back: Normal range of motion and neck supple  No rigidity or tenderness  Lymphadenopathy:      Cervical: No cervical adenopathy  Neurological:      Mental Status: He is alert and oriented to person, place, and time  Psychiatric:         Mood and Affect: Mood normal          Behavior: Behavior normal          Thought Content:  Thought content normal          Judgment: Judgment normal          Pertinent Laboratory/Diagnostic Studies:  Lab Results   Component Value Date    GLUCOSE 122 02/10/2016    BUN 16 11/05/2021    CREATININE 0 73 11/05/2021    CALCIUM 9 0 11/05/2021    K 4 2 11/05/2021    CO2 28 11/05/2021     11/05/2021     Lab Results   Component Value Date    ALT 62 11/05/2021    AST 45 11/05/2021    ALKPHOS 94 11/05/2021       Lab Results   Component Value Date    WBC 6 22 11/07/2020    HGB 14 0 11/07/2020    HCT 43 4 11/07/2020    MCV 86 11/07/2020     11/07/2020       No results found for: TSH    No results found for: CHOL  Lab Results   Component Value Date    TRIG 115 11/05/2021     Lab Results   Component Value Date    HDL 31 (L) 11/05/2021     Lab Results   Component Value Date    LDLCALC 80 11/05/2021     Lab Results   Component Value Date    HGBA1C 5 7 (H) 11/05/2021       Results for orders placed or performed in visit on 11/20/21   UA (URINE) with reflex to Scope   Result Value Ref Range    Color, UA Light Yellow     Clarity, UA Clear     Specific Gravity, UA 1 025 1 000 - 1 030    pH, UA 6 0 5 0, 5 5, 6 0, 6 5, 7 0, 7  5, 8 0, 8 5, 9 0    Leukocytes, UA Negative Negative    Nitrite, UA Negative Negative    Protein, UA Negative Negative mg/dl    Glucose, UA Negative Negative mg/dl    Ketones, UA Negative Negative mg/dl    Urobilinogen, UA 0 2 0 2, 1 0 E U /dl E U /dl    Bilirubin, UA Negative Negative    Blood, UA Negative Negative       Orders Placed This Encounter   Procedures    Comprehensive metabolic panel    Lipid Panel with Direct LDL reflex    HEMOGLOBIN A1C W/ EAG ESTIMATION    Ambulatory Referral to Psychiatry       ALLERGIES:  Allergies   Allergen Reactions    Olmesartan-Amlodipine-Hctz Edema    Terazosin Edema       Current Medications     Current Outpatient Medications   Medication Sig Dispense Refill    escitalopram (LEXAPRO) 10 mg tablet Take 1 tablet (10 mg total) by mouth daily 90 tablet 0    fexofenadine (ALLEGRA) 180 MG tablet Take 180 mg by mouth as needed        losartan (COZAAR) 100 MG tablet Take 1 tablet (100 mg total) by mouth daily 90 tablet 2    VITAMIN D PO Take by mouth daily      Vyvanse 50 MG capsule Take 1 capsule (50 mg total) by mouth daily Max Daily Amount: 50 mg 30 capsule 0    hydrocortisone (ANUSOL-HC) 25 mg suppository Insert 1 suppository (25 mg total) into the rectum 2 (two) times a day as needed for hemorrhoids (Patient not taking: Reported on 10/6/2020) 12 suppository 1     No current facility-administered medications for this visit           Health Maintenance     Health Maintenance   Topic Date Due    Pneumococcal Vaccine: Pediatrics (0 to 5 Years) and At-Risk Patients (6 to 59 Years) (1 of 2 - PPSV23) 11/05/2021    Depression Screening  09/10/2022    Annual Physical  09/10/2022    BMI: Followup Plan  03/07/2023    BMI: Adult  03/07/2023    DTaP,Tdap,and Td Vaccines (2 - Td or Tdap) 05/06/2027    HIV Screening  Completed    Hepatitis C Screening  Completed    Influenza Vaccine  Completed    COVID-19 Vaccine  Completed    HIB Vaccine  Aged Out    Hepatitis B Vaccine  Aged Out    IPV Vaccine  Aged Out    Hepatitis A Vaccine  Aged Out    Meningococcal ACWY Vaccine  Aged Out    HPV Vaccine  Aged Out     Immunization History   Administered Date(s) Administered    COVID-19 PFIZER VACCINE 0 3 ML IM 03/28/2021, 04/18/2021, 12/10/2021    Influenza Injectable, MDCK, Preservative Free, Quadrivalent, 0 5 mL 09/26/2019    Influenza, recombinant, quadrivalent,injectable, preservative free 09/25/2020, 10/15/2021    Influenza, seasonal, injectable 10/01/2017    Pneumococcal Conjugate 13-Valent 09/10/2021    Tdap 05/06/2017     BMI Counseling: Body mass index is 43 2 kg/m²  The BMI is above normal  Nutrition recommendations include moderation in carbohydrate intake  Exercise recommendations include exercising 3-5 times per week  No pharmacotherapy was ordered  Patient referred to PCP  Rationale for BMI follow-up plan is due to patient being overweight or obese           [address] Naima Caban DO

## 2022-03-10 DIAGNOSIS — F90.0 ATTENTION DEFICIT HYPERACTIVITY DISORDER (ADHD), PREDOMINANTLY INATTENTIVE TYPE: Primary | ICD-10-CM

## 2022-04-07 DIAGNOSIS — F90.0 ATTENTION DEFICIT HYPERACTIVITY DISORDER (ADHD), PREDOMINANTLY INATTENTIVE TYPE: ICD-10-CM

## 2022-04-14 ENCOUNTER — TELEMEDICINE (OUTPATIENT)
Dept: FAMILY MEDICINE CLINIC | Facility: CLINIC | Age: 42
End: 2022-04-14
Payer: COMMERCIAL

## 2022-04-14 ENCOUNTER — PATIENT MESSAGE (OUTPATIENT)
Dept: FAMILY MEDICINE CLINIC | Facility: CLINIC | Age: 42
End: 2022-04-14

## 2022-04-14 DIAGNOSIS — G44.209 TENSION HEADACHE: Primary | ICD-10-CM

## 2022-04-14 PROCEDURE — 99213 OFFICE O/P EST LOW 20 MIN: CPT | Performed by: FAMILY MEDICINE

## 2022-04-14 RX ORDER — NAPROXEN 500 MG/1
500 TABLET ORAL 2 TIMES DAILY WITH MEALS
Qty: 40 TABLET | Refills: 1 | Status: SHIPPED | OUTPATIENT
Start: 2022-04-14

## 2022-04-14 RX ORDER — CYCLOBENZAPRINE HCL 5 MG
5 TABLET ORAL 3 TIMES DAILY PRN
Qty: 40 TABLET | Refills: 1 | Status: SHIPPED | OUTPATIENT
Start: 2022-04-14

## 2022-04-14 NOTE — PROGRESS NOTES
Virtual Regular Visit    Verification of patient location:    Patient is located in the following state in which I hold an active license [place]      Assessment/Plan:    Problem List Items Addressed This Visit     None      Visit Diagnoses     Tension headache    -  Primary    Pt stable; likely by hx given  Treat with Naproxen PRN with food, Cyclobenzaprine PRN muscle spasm, heat to his neck, etc   Precautions given  Relevant Medications    naproxen (Naprosyn) 500 mg tablet    cyclobenzaprine (FLEXERIL) 5 mg tablet               Reason for visit is   Chief Complaint   Patient presents with    Headache     patient being seen for headache x 4 days    Virtual Regular Visit        Encounter provider Marsh Rubinstein, DO    Provider located at [place] [address] [place] [id number]      Recent Visits  No visits were found meeting these conditions  Showing recent visits within past 7 days and meeting all other requirements  Today's Visits  Date Type Provider Dept   04/14/22 Telemedicine Bk Alexis DO Pg Anastasiia Ahn   Showing today's visits and meeting all other requirements  Future Appointments  No visits were found meeting these conditions  Showing future appointments within next 150 days and meeting all other requirements       The patient was identified by name and date of birth  Guerrero Paredes was informed that this is a telemedicine visit and that the visit is being conducted through [facility] and patient was informed that this is not a secure, HIPAA-compliant platform  He agrees to proceed     My office door was closed  No one else was in the room  He acknowledged consent and understanding of privacy and security of the video platform  The patient has agreed to participate and understands they can discontinue the visit at any time  Patient is aware this is a billable service       Subjective  Guerrero Paredes is a 46 y o  male presents a couple days after the onset of a headache in the left occipital region, now into the left neck  Has not been sleeping well recently; toddler son with sleep pattern changes  As above  Past Medical History:   Diagnosis Date    ADHD (attention deficit hyperactivity disorder)     CPAP (continuous positive airway pressure) dependence     Hypersomnolence     Hypertension     Hypoxia     Infrapatellar bursitis of right knee     Mononucleosis     Mycobacterial infectious disease     JAMIE (obstructive sleep apnea)     Pneumonia     Right upper lobe pneumonia 10/7/2020    Varicella        Past Surgical History:   Procedure Laterality Date    BRONCHOSCOPY      CARPAL TUNNEL RELEASE Bilateral     CIRCUMCISION      TONSILLECTOMY      WISDOM TOOTH EXTRACTION      X1        Current Outpatient Medications   Medication Sig Dispense Refill    escitalopram (LEXAPRO) 10 mg tablet Take 1 tablet (10 mg total) by mouth daily 90 tablet 0    fexofenadine (ALLEGRA) 180 MG tablet Take 180 mg by mouth as needed        lisdexamfetamine (Vyvanse) 40 MG capsule Take 1 capsule (40 mg total) by mouth every morning Max Daily Amount: 40 mg 30 capsule 0    losartan (COZAAR) 100 MG tablet Take 1 tablet (100 mg total) by mouth daily 90 tablet 2    VITAMIN D PO Take by mouth daily      cyclobenzaprine (FLEXERIL) 5 mg tablet Take 1 tablet (5 mg total) by mouth 3 (three) times a day as needed for muscle spasms 40 tablet 1    hydrocortisone (ANUSOL-HC) 25 mg suppository Insert 1 suppository (25 mg total) into the rectum 2 (two) times a day as needed for hemorrhoids (Patient not taking: Reported on 10/6/2020) 12 suppository 1    naproxen (Naprosyn) 500 mg tablet Take 1 tablet (500 mg total) by mouth 2 (two) times a day with meals as needed  40 tablet 1     No current facility-administered medications for this visit          Allergies   Allergen Reactions    Olmesartan-Amlodipine-Hctz Edema    Terazosin Edema       Review of Systems Constitutional: Negative for activity change and fever  HENT: Negative for congestion and rhinorrhea  Eyes: Negative for photophobia  +/-Light sensitivity  Respiratory: Negative for cough and shortness of breath  Cardiovascular: Negative for chest pain  Gastrointestinal: Negative for nausea  Neurological: Positive for headaches  No radicular pain to the left arm  Video Exam    Vitals:       Physical Exam  Constitutional:       General: He is not in acute distress  Appearance: Normal appearance  He is not ill-appearing, toxic-appearing or diaphoretic  HENT:      Head: Normocephalic and atraumatic  Eyes:      General: No scleral icterus  Conjunctiva/sclera: Conjunctivae normal    Pulmonary:      Effort: Pulmonary effort is normal  No respiratory distress  Neurological:      Mental Status: He is alert and oriented to person, place, and time  Psychiatric:         Mood and Affect: Mood normal          Behavior: Behavior normal          Thought Content: Thought content normal          Judgment: Judgment normal           Diaz Morning was seen today for headache and virtual regular visit  Diagnoses and all orders for this visit:    Tension headache  Comments:  Pt stable; likely by hx given  Treat with Naproxen PRN with food, Cyclobenzaprine PRN muscle spasm, heat to his neck, etc   Precautions given  Orders:  -     naproxen (Naprosyn) 500 mg tablet; Take 1 tablet (500 mg total) by mouth 2 (two) times a day with meals as needed  -     cyclobenzaprine (FLEXERIL) 5 mg tablet; Take 1 tablet (5 mg total) by mouth 3 (three) times a day as needed for muscle spasms          I spent 15 minutes with patient today in which greater than 50% of the time was spent in counseling/coordination of care regarding his symptoms  VIRTUAL VISIT DISCLAIMER      Clementine Knox verbally agrees to participate in [employer]   Pt is aware that Virtual Care Services could be limited without vital signs or the ability to perform a full hands-on physical Ziggy Greenberg understands he or the provider may request at any time to terminate the video visit and request the patient to seek care or treatment in person

## 2022-04-19 ENCOUNTER — PATIENT MESSAGE (OUTPATIENT)
Dept: FAMILY MEDICINE CLINIC | Facility: CLINIC | Age: 42
End: 2022-04-19

## 2022-04-20 ENCOUNTER — OFFICE VISIT (OUTPATIENT)
Dept: FAMILY MEDICINE CLINIC | Facility: CLINIC | Age: 42
End: 2022-04-20
Payer: COMMERCIAL

## 2022-04-20 VITALS
OXYGEN SATURATION: 99 % | BODY MASS INDEX: 41.75 KG/M2 | DIASTOLIC BLOOD PRESSURE: 70 MMHG | SYSTOLIC BLOOD PRESSURE: 148 MMHG | TEMPERATURE: 97.8 F | HEART RATE: 83 BPM | RESPIRATION RATE: 16 BRPM | HEIGHT: 73 IN | WEIGHT: 315 LBS

## 2022-04-20 DIAGNOSIS — R60.0 BILATERAL LEG EDEMA: ICD-10-CM

## 2022-04-20 DIAGNOSIS — R51.9 ACUTE NONINTRACTABLE HEADACHE, UNSPECIFIED HEADACHE TYPE: Primary | ICD-10-CM

## 2022-04-20 DIAGNOSIS — S46.812D TRAPEZIUS STRAIN, LEFT, SUBSEQUENT ENCOUNTER: ICD-10-CM

## 2022-04-20 PROCEDURE — 3077F SYST BP >= 140 MM HG: CPT | Performed by: FAMILY MEDICINE

## 2022-04-20 PROCEDURE — 1036F TOBACCO NON-USER: CPT | Performed by: FAMILY MEDICINE

## 2022-04-20 PROCEDURE — 3008F BODY MASS INDEX DOCD: CPT | Performed by: FAMILY MEDICINE

## 2022-04-20 PROCEDURE — 3078F DIAST BP <80 MM HG: CPT | Performed by: FAMILY MEDICINE

## 2022-04-20 PROCEDURE — 99213 OFFICE O/P EST LOW 20 MIN: CPT | Performed by: FAMILY MEDICINE

## 2022-04-20 NOTE — PROGRESS NOTES
Assessment/Plan:    Patient evaluated for left neck pain radiating to the left parietal region  Likely MSK pain from strained trapezius and epicranial aponeurosis  Naproxen and flexeril have helped  Hypertonicity an pain noted below the occipital ridge  Continue using flexeril  NSAIDs likely elevating BP  Leg edema may be due to increased Na intake  Admits to eating a poor diet high in salt and calories  Asked to limit the sodium and call Gained 13 lbs since 3/7/22  Heat and stretching reccommended for the neck  No focal deficits on neuro exam  Call precautions  Problem List Items Addressed This Visit     None      Visit Diagnoses     Acute nonintractable headache, unspecified headache type    -  Primary    Bilateral leg edema        Trapezius strain, left, subsequent encounter                Subjective:      Patient ID: Frankie Bae is a 46 y o  male here to discuss left neck pain and parietal headache  Pain started 10 days ago  Pt has been sleeping on the couch due to his toddler's changing sleep pattens  Pain progressively worsened  He saw his PCP last Thursday and prescribed naproxen and flexeril  He noted a reduction in pain and stiffness with the medications  Certain neck movements make the pain worse and bending also exacerbates the pain  Denies visual disturbance, dizziness, SOB, lightheadedness, nausea, or vomiting  Also reports heaviness and weakness in both legs  Notes pain and tingling in the anterior left leg  Chronic   Improved with PT but since stopping PT, pain slowly retuning      The following portions of the patient's history were reviewed and updated as appropriate:   He  has a past medical history of ADHD (attention deficit hyperactivity disorder), CPAP (continuous positive airway pressure) dependence, Hypersomnolence, Hypertension, Hypoxia, Infrapatellar bursitis of right knee, Mononucleosis, Mycobacterial infectious disease, JAMIE (obstructive sleep apnea), Pneumonia, Right upper lobe pneumonia (10/7/2020), and Varicella  He   Patient Active Problem List    Diagnosis Date Noted    COVID-19 12/23/2020    Periodic limb movement 12/11/2020    Right upper lobe pneumonia 10/07/2020    Positive blood culture 10/07/2020    Chronic fatigue 09/25/2020    Influenza 01/05/2020    JAMIE (obstructive sleep apnea) 07/25/2018    Hypersomnolence 12/22/2017    Infrapatellar bursitis of right knee 12/12/2017    Impaired fasting glucose 02/11/2016    Asthma 02/11/2016    Morbid obesity with BMI of 40 0-44 9, adult ([facility] [address] ) 02/09/2016    Pneumonia 02/07/2016    ADHD (attention deficit hyperactivity disorder)     Hypertension     Excessive daytime sleepiness 12/23/2015     He  has a past surgical history that includes Tonsillectomy; Carpal tunnel release (Bilateral); Soso tooth extraction; Circumcision; and Bronchoscopy  His family history includes Arthritis in his family; Cancer in his family and paternal grandmother; Diabetes in his father and mother; Heart attack in his father and paternal grandfather; Heart disease in his father; Hypertension in his father and mother; No Known Problems in his son; Obesity in his father  He  reports that he has never smoked  He has never used smokeless tobacco  He reports current alcohol use  He reports that he does not use drugs  Current Outpatient Medications on File Prior to Visit   Medication Sig    cyclobenzaprine (FLEXERIL) 5 mg tablet Take 1 tablet (5 mg total) by mouth 3 (three) times a day as needed for muscle spasms    escitalopram (LEXAPRO) 10 mg tablet Take 1 tablet (10 mg total) by mouth daily    fexofenadine (ALLEGRA) 180 MG tablet Take 180 mg by mouth as needed      losartan (COZAAR) 100 MG tablet Take 1 tablet (100 mg total) by mouth daily    naproxen (Naprosyn) 500 mg tablet Take 1 tablet (500 mg total) by mouth 2 (two) times a day with meals as needed      VITAMIN D PO Take by mouth daily    lisdexamfetamine (Vyvanse) 40 MG capsule Take 1 capsule (40 mg total) by mouth every morning Max Daily Amount: 40 mg    [DISCONTINUED] hydrocortisone (ANUSOL-HC) 25 mg suppository Insert 1 suppository (25 mg total) into the rectum 2 (two) times a day as needed for hemorrhoids (Patient not taking: Reported on 10/6/2020)     No current facility-administered medications on file prior to visit  He is allergic to olmesartan-amlodipine-hctz and terazosin       Review of Systems   Eyes: Negative for visual disturbance  Musculoskeletal: Positive for neck pain  Neurological: Positive for weakness, numbness and headaches  Objective:      /70   Pulse 83   Temp 97 8 °F (36 6 °C) (Tympanic)   Resp 16   Ht 6' 1" (1 854 m)   Wt (!) 154 kg (340 lb)   SpO2 99%   BMI 44 86 kg/m²          Physical Exam  Vitals reviewed  Constitutional:       General: He is not in acute distress  Appearance: Normal appearance  He is obese  He is not ill-appearing  HENT:      Head: Normocephalic and atraumatic  Eyes:      Extraocular Movements: Extraocular movements intact  Cardiovascular:      Rate and Rhythm: Normal rate  Heart sounds: No murmur heard  Pulmonary:      Effort: Pulmonary effort is normal  No respiratory distress  Breath sounds: Normal breath sounds  No stridor  No wheezing or rhonchi  Musculoskeletal:        Arms:       Cervical back: Tenderness present  No erythema, spasms or bony tenderness  Decreased range of motion  Right lower leg: Edema (2 + pitting edema ) present  Left lower leg: Edema (2+ edema ) present  Comments: Able to turn the neck 160 degrees to the right and 180 to the left    Skin:     General: Skin is warm  Neurological:      Mental Status: He is alert and oriented to person, place, and time  Cranial Nerves: No dysarthria or facial asymmetry  Sensory: Sensation is intact  No sensory deficit  Motor: No weakness  Coordination: Coordination is intact        Gait: Gait is intact  Deep Tendon Reflexes:      Reflex Scores:       Brachioradialis reflexes are 2+ on the right side and 2+ on the left side  Patellar reflexes are 2+ on the right side and 2+ on the left side    Psychiatric:         Mood and Affect: Mood normal          Behavior: Behavior normal

## 2022-05-03 DIAGNOSIS — F90.0 ATTENTION DEFICIT HYPERACTIVITY DISORDER (ADHD), PREDOMINANTLY INATTENTIVE TYPE: ICD-10-CM

## 2022-05-08 DIAGNOSIS — F32.A ANXIETY AND DEPRESSION: ICD-10-CM

## 2022-05-08 DIAGNOSIS — F41.9 ANXIETY AND DEPRESSION: ICD-10-CM

## 2022-05-08 DIAGNOSIS — I10 ESSENTIAL HYPERTENSION: ICD-10-CM

## 2022-05-09 RX ORDER — LOSARTAN POTASSIUM 100 MG/1
100 TABLET ORAL DAILY
Qty: 90 TABLET | Refills: 0 | Status: SHIPPED | OUTPATIENT
Start: 2022-05-09 | End: 2022-08-03 | Stop reason: SDUPTHER

## 2022-05-09 RX ORDER — ESCITALOPRAM OXALATE 10 MG/1
10 TABLET ORAL DAILY
Qty: 90 TABLET | Refills: 0 | Status: SHIPPED | OUTPATIENT
Start: 2022-05-09 | End: 2022-08-03 | Stop reason: SDUPTHER

## 2022-05-31 DIAGNOSIS — F90.0 ATTENTION DEFICIT HYPERACTIVITY DISORDER (ADHD), PREDOMINANTLY INATTENTIVE TYPE: ICD-10-CM

## 2022-08-02 ENCOUNTER — TELEPHONE (OUTPATIENT)
Dept: SLEEP CENTER | Facility: CLINIC | Age: 42
End: 2022-08-02

## 2022-08-03 DIAGNOSIS — F32.A ANXIETY AND DEPRESSION: ICD-10-CM

## 2022-08-03 DIAGNOSIS — F90.0 ATTENTION DEFICIT HYPERACTIVITY DISORDER (ADHD), PREDOMINANTLY INATTENTIVE TYPE: ICD-10-CM

## 2022-08-03 DIAGNOSIS — I10 ESSENTIAL HYPERTENSION: ICD-10-CM

## 2022-08-03 DIAGNOSIS — F41.9 ANXIETY AND DEPRESSION: ICD-10-CM

## 2022-08-03 RX ORDER — LOSARTAN POTASSIUM 100 MG/1
100 TABLET ORAL DAILY
Qty: 90 TABLET | Refills: 0 | Status: SHIPPED | OUTPATIENT
Start: 2022-08-03 | End: 2022-10-27 | Stop reason: SDUPTHER

## 2022-08-03 RX ORDER — ESCITALOPRAM OXALATE 10 MG/1
10 TABLET ORAL DAILY
Qty: 90 TABLET | Refills: 0 | Status: SHIPPED | OUTPATIENT
Start: 2022-08-03 | End: 2022-10-27 | Stop reason: SDUPTHER

## 2022-08-04 DIAGNOSIS — F90.0 ATTENTION DEFICIT HYPERACTIVITY DISORDER (ADHD), PREDOMINANTLY INATTENTIVE TYPE: ICD-10-CM

## 2022-08-22 ENCOUNTER — OFFICE VISIT (OUTPATIENT)
Dept: FAMILY MEDICINE CLINIC | Facility: CLINIC | Age: 42
End: 2022-08-22
Payer: COMMERCIAL

## 2022-08-22 ENCOUNTER — HOSPITAL ENCOUNTER (OUTPATIENT)
Dept: RADIOLOGY | Facility: HOSPITAL | Age: 42
Discharge: HOME | End: 2022-08-22
Attending: FAMILY MEDICINE
Payer: COMMERCIAL

## 2022-08-22 VITALS
RESPIRATION RATE: 14 BRPM | DIASTOLIC BLOOD PRESSURE: 84 MMHG | BODY MASS INDEX: 41.75 KG/M2 | TEMPERATURE: 98 F | WEIGHT: 315 LBS | SYSTOLIC BLOOD PRESSURE: 120 MMHG | OXYGEN SATURATION: 98 % | HEART RATE: 77 BPM | HEIGHT: 73 IN

## 2022-08-22 DIAGNOSIS — M54.42 ACUTE LEFT-SIDED LOW BACK PAIN WITH LEFT-SIDED SCIATICA: Primary | ICD-10-CM

## 2022-08-22 DIAGNOSIS — M54.42 ACUTE LEFT-SIDED LOW BACK PAIN WITH LEFT-SIDED SCIATICA: ICD-10-CM

## 2022-08-22 PROCEDURE — 3074F SYST BP LT 130 MM HG: CPT | Performed by: FAMILY MEDICINE

## 2022-08-22 PROCEDURE — 72100 X-RAY EXAM L-S SPINE 2/3 VWS: CPT

## 2022-08-22 PROCEDURE — 99213 OFFICE O/P EST LOW 20 MIN: CPT | Performed by: FAMILY MEDICINE

## 2022-08-22 PROCEDURE — 3725F SCREEN DEPRESSION PERFORMED: CPT | Performed by: FAMILY MEDICINE

## 2022-08-22 PROCEDURE — 3079F DIAST BP 80-89 MM HG: CPT | Performed by: FAMILY MEDICINE

## 2022-08-22 RX ORDER — METHYLPREDNISOLONE 4 MG/1
TABLET ORAL
Qty: 21 EACH | Refills: 0 | Status: SHIPPED | OUTPATIENT
Start: 2022-08-22

## 2022-08-22 NOTE — PROGRESS NOTES
FAMILY PRACTICE OFFICE VISIT       NAME: Sheldon Orellana  AGE: 46 y o  SEX: male       : 1980        MRN: [id number]    DATE: 2022  TIME: 4:20 PM    Assessment and Plan   1  Acute left-sided low back pain with left-sided sciatica  Comments:  Pt stable  Treat with Medrol dosepak, heat to the back, and stretches he did with PT prior  Will get xrays  Orders:  -     methylPREDNISolone 4 MG tablet therapy pack; Use as directed on package  -     XR spine lumbar 2 or 3 views injury; Future; Expected date: 2022         There are no Patient Instructions on file for this visit  Chief Complaint     Chief Complaint   Patient presents with    Back Pain     Patient being seen for lower back/pelvic pain x 3 years intermittent, getting worse    Leg Pain     Patient being seen for left shin and hamstring pain x 3 years intermittent, getting worse        History of Present Illness   Sheldon Orellana is a 46y o -year-old male who presents with a c/o issues with the bilateral lower back, over the last couple months - especially when stretching out / extending his back  But is getting now some burning in the anterolateral shin, and numbness into the left big toe  No loss of bowel / bladder control, no saddle anesthesia  Sitting is much better  No hx of back surgery  Went to PT in the past, around   Review of Systems   Review of Systems   Constitutional: Negative for activity change  Musculoskeletal: Positive for back pain  Neurological: Positive for numbness         Active Problem List     Patient Active Problem List   Diagnosis    Pneumonia    ADHD (attention deficit hyperactivity disorder)    Hypertension    Morbid obesity with BMI of 40 0-44 9, adult (HCC)    Impaired fasting glucose    Asthma    JAMIE (obstructive sleep apnea)    Excessive daytime sleepiness    Hypersomnolence    Infrapatellar bursitis of right knee    Influenza    Chronic fatigue    Right upper lobe pneumonia    Positive blood culture    Periodic limb movement    COVID-19         Past Medical History:  Past Medical History:   Diagnosis Date    ADHD (attention deficit hyperactivity disorder)     CPAP (continuous positive airway pressure) dependence     Hypersomnolence     Hypertension     Hypoxia     Infrapatellar bursitis of right knee     Mononucleosis     Mycobacterial infectious disease     JAMIE (obstructive sleep apnea)     Pneumonia     Right upper lobe pneumonia 10/7/2020    Varicella        Past Surgical History:  Past Surgical History:   Procedure Laterality Date    BRONCHOSCOPY      CARPAL TUNNEL RELEASE Bilateral     CIRCUMCISION      TONSILLECTOMY      WISDOM TOOTH EXTRACTION      X1        Family History:  Family History   Problem Relation Age of Onset    Hypertension Mother     Diabetes Mother         pre-diabetic     Heart attack Father     Diabetes Father     Hypertension Father     Obesity Father         hx LRYGB    Heart disease Father         hx MI age 36   [facility] Arthritis Family     Cancer Family     Cancer Paternal Grandmother         hx bladder cancer    Heart attack Paternal Grandfather     No Known Problems Son     Stroke Neg Hx     Thyroid disease Neg Hx        Social History:  Social History     Socioeconomic History    Marital status: /Civil Union     Spouse name: jeronimo     Number of children: 1    Years of education: Not on file    Highest education level: Not on file   Occupational History    Not on file   Tobacco Use    Smoking status: Never Smoker    Smokeless tobacco: Never Used   Vaping Use    Vaping Use: Former    Substances: Flavoring   Substance and Sexual Activity    Alcohol use: Yes     Comment: rare    Drug use: No    Sexual activity: Yes     Partners: Female   Other Topics Concern    Not on file   Social History Narrative    Who lives in your home: Wife and son     What type of home do you live in: Single house    Age of your home: 1996     How long have you been living there: 2016    Type of heat: Forced hot air    Type of fuel: Oil    What type of kaur is in your bedroom: Hardwood floor    Do you have the following in or near your home:    Air products: Central air, Air , Humidifier and Dehumidifier    Pests: None    Pets: Dogs (Safia Lomeli  and Whitney Acuna)     Are pets allowed in bedroom: Yes    Open fields, wooded areas nearby: Open fields and Wooded areas    Basement: Damp and Unfinished    Exposure to second hand smoke: No        Habits:    Caffeine: coffee 1 cup daily-soda and ice tea  minimally- hot tea when he's sick     Chocolate: 1x a month     Other:     Social Determinants of Health     Financial Resource Strain: Not on file   Food Insecurity: Not on file   Transportation Needs: Not on file   Physical Activity: Not on file   Stress: Not on file   Social Connections: Not on file   Intimate Partner Violence: Not on file   Housing Stability: Not on file       Objective     Vitals:    08/22/22 1540   BP: 120/84   Pulse: 77   Resp: 14   Temp: 98 °F (36 7 °C)   SpO2: 98%     Wt Readings from Last 3 Encounters:   08/22/22 (!) 149 kg (327 lb 6 4 oz)   04/20/22 (!) 154 kg (340 lb)   03/07/22 (!) 149 kg (327 lb 6 4 oz)       Physical Exam  Vitals and nursing note reviewed  Constitutional:       General: He is not in acute distress  Appearance: Normal appearance  He is not ill-appearing, toxic-appearing or diaphoretic  HENT:      Head: Normocephalic and atraumatic  Eyes:      General: No scleral icterus  Conjunctiva/sclera: Conjunctivae normal    Musculoskeletal:        Back:    Neurological:      Mental Status: He is alert and oriented to person, place, and time  Sensory: Sensation is intact  No sensory deficit  Motor: Motor function is intact  No weakness  Deep Tendon Reflexes:      Reflex Scores:       Patellar reflexes are 2+ on the right side and 2+ on the left side         Achilles reflexes are 2+ on the right side and 2+ on the left side  Comments: MS +5/5 b/l lower legs  Psychiatric:         Mood and Affect: Mood normal          Behavior: Behavior normal          Thought Content:  Thought content normal          Judgment: Judgment normal          Pertinent Laboratory/Diagnostic Studies:  Lab Results   Component Value Date    GLUCOSE 122 02/10/2016    BUN 16 11/05/2021    CREATININE 0 73 11/05/2021    CALCIUM 9 0 11/05/2021    K 4 2 11/05/2021    CO2 28 11/05/2021     11/05/2021     Lab Results   Component Value Date    ALT 62 11/05/2021    AST 45 11/05/2021    ALKPHOS 94 11/05/2021       Lab Results   Component Value Date    WBC 6 22 11/07/2020    HGB 14 0 11/07/2020    HCT 43 4 11/07/2020    MCV 86 11/07/2020     11/07/2020       No results found for: TSH    No results found for: CHOL  Lab Results   Component Value Date    TRIG 115 11/05/2021     Lab Results   Component Value Date    HDL 31 (L) 11/05/2021     Lab Results   Component Value Date    LDLCALC 80 11/05/2021     Lab Results   Component Value Date    HGBA1C 5 7 (H) 11/05/2021       Results for orders placed or performed in visit on 11/20/21   UA (URINE) with reflex to Scope   Result Value Ref Range    Color, UA Light Yellow     Clarity, UA Clear     Specific Gravity, UA 1 025 1 000 - 1 030    pH, UA 6 0 5 0, 5 5, 6 0, 6 5, 7 0, 7 5, 8 0, 8 5, 9 0    Leukocytes, UA Negative Negative    Nitrite, UA Negative Negative    Protein, UA Negative Negative mg/dl    Glucose, UA Negative Negative mg/dl    Ketones, UA Negative Negative mg/dl    Urobilinogen, UA 0 2 0 2, 1 0 E U /dl E U /dl    Bilirubin, UA Negative Negative    Occult Blood, UA Negative Negative       Orders Placed This Encounter   Procedures    XR spine lumbar 2 or 3 views injury       ALLERGIES:  Allergies   Allergen Reactions    Olmesartan-Amlodipine-Hctz Edema    Terazosin Edema       Current Medications     Current Outpatient Medications   Medication Sig Dispense Refill  escitalopram (LEXAPRO) 10 mg tablet Take 1 tablet (10 mg total) by mouth daily 90 tablet 0    fexofenadine (ALLEGRA) 180 MG tablet Take 180 mg by mouth as needed        lisdexamfetamine (Vyvanse) 40 MG capsule Take 1 capsule (40 mg total) by mouth every morning Max Daily Amount: 40 mg 30 capsule 0    losartan (COZAAR) 100 MG tablet Take 1 tablet (100 mg total) by mouth daily 90 tablet 0    methylPREDNISolone 4 MG tablet therapy pack Use as directed on package 21 each 0    VITAMIN D PO Take by mouth daily      cyclobenzaprine (FLEXERIL) 5 mg tablet Take 1 tablet (5 mg total) by mouth 3 (three) times a day as needed for muscle spasms 40 tablet 1    naproxen (Naprosyn) 500 mg tablet Take 1 tablet (500 mg total) by mouth 2 (two) times a day with meals as needed  40 tablet 1     No current facility-administered medications for this visit           Health Maintenance     Health Maintenance   Topic Date Due    Influenza Vaccine (1) 09/01/2022    Annual Physical  09/10/2022    Pneumococcal Vaccine: Pediatrics (0 to 5 Years) and At-Risk Patients (6 to 59 Years) (2 - PPSV23 or PCV20) 09/10/2022    BMI: Followup Plan  03/07/2023    Depression Screening  08/22/2023    BMI: Adult  08/22/2023    DTaP,Tdap,and Td Vaccines (2 - Td or Tdap) 05/06/2027    HIV Screening  Completed    Hepatitis C Screening  Completed    COVID-19 Vaccine  Completed    HIB Vaccine  Aged Out    Hepatitis B Vaccine  Aged Out    IPV Vaccine  Aged Out    Hepatitis A Vaccine  Aged Out    Meningococcal ACWY Vaccine  Aged Out    HPV Vaccine  Aged Out     Immunization History   Administered Date(s) Administered    COVID-19 PFIZER VACCINE 0 3 ML IM 03/28/2021, 04/18/2021, 12/10/2021    Influenza Injectable, MDCK, Preservative Free, Quadrivalent, 0 5 mL 09/26/2019    Influenza, recombinant, quadrivalent,injectable, preservative free 09/25/2020, 10/15/2021    Influenza, seasonal, injectable 10/01/2017    Pneumococcal Conjugate 13-Valent 09/10/2021    Tdap 05/06/2017          Bk Hardwick DO

## 2022-08-28 NOTE — RESULT ENCOUNTER NOTE
Please let the patient know that their lumbar xrays showed some mild degenerative (wear and tear with time) changes, but nothing acute (fractures, mal-allignment, etc)  He is to f/u as planned  Thanks     Dr Michelle Kenyon

## 2022-08-30 DIAGNOSIS — F90.0 ATTENTION DEFICIT HYPERACTIVITY DISORDER (ADHD), PREDOMINANTLY INATTENTIVE TYPE: ICD-10-CM

## 2022-09-19 ENCOUNTER — OFFICE VISIT (OUTPATIENT)
Dept: FAMILY MEDICINE CLINIC | Facility: CLINIC | Age: 42
End: 2022-09-19
Payer: COMMERCIAL

## 2022-09-19 VITALS
SYSTOLIC BLOOD PRESSURE: 140 MMHG | DIASTOLIC BLOOD PRESSURE: 80 MMHG | BODY MASS INDEX: 42.66 KG/M2 | OXYGEN SATURATION: 98 % | HEIGHT: 72 IN | WEIGHT: 315 LBS | HEART RATE: 87 BPM | RESPIRATION RATE: 16 BRPM | TEMPERATURE: 98.1 F

## 2022-09-19 DIAGNOSIS — J45.20 MILD INTERMITTENT ASTHMA WITHOUT COMPLICATION: ICD-10-CM

## 2022-09-19 DIAGNOSIS — M54.42 ACUTE LEFT-SIDED LOW BACK PAIN WITH LEFT-SIDED SCIATICA: ICD-10-CM

## 2022-09-19 DIAGNOSIS — Z13.6 SCREENING FOR CARDIOVASCULAR CONDITION: ICD-10-CM

## 2022-09-19 DIAGNOSIS — R73.01 IMPAIRED FASTING GLUCOSE: ICD-10-CM

## 2022-09-19 DIAGNOSIS — E66.01 MORBID OBESITY WITH BMI OF 40.0-44.9, ADULT (HCC): ICD-10-CM

## 2022-09-19 DIAGNOSIS — Z00.00 ANNUAL PHYSICAL EXAM: Primary | ICD-10-CM

## 2022-09-19 DIAGNOSIS — F90.0 ATTENTION DEFICIT HYPERACTIVITY DISORDER (ADHD), PREDOMINANTLY INATTENTIVE TYPE: ICD-10-CM

## 2022-09-19 DIAGNOSIS — F41.9 ANXIETY AND DEPRESSION: ICD-10-CM

## 2022-09-19 DIAGNOSIS — G47.33 OSA (OBSTRUCTIVE SLEEP APNEA): ICD-10-CM

## 2022-09-19 DIAGNOSIS — I10 PRIMARY HYPERTENSION: ICD-10-CM

## 2022-09-19 DIAGNOSIS — F32.A ANXIETY AND DEPRESSION: ICD-10-CM

## 2022-09-19 DIAGNOSIS — Z23 IMMUNIZATION DUE: ICD-10-CM

## 2022-09-19 PROCEDURE — 90677 PCV20 VACCINE IM: CPT

## 2022-09-19 PROCEDURE — 90682 RIV4 VACC RECOMBINANT DNA IM: CPT

## 2022-09-19 PROCEDURE — 90472 IMMUNIZATION ADMIN EACH ADD: CPT

## 2022-09-19 PROCEDURE — 99395 PREV VISIT EST AGE 18-39: CPT | Performed by: FAMILY MEDICINE

## 2022-09-19 PROCEDURE — 3079F DIAST BP 80-89 MM HG: CPT | Performed by: FAMILY MEDICINE

## 2022-09-19 PROCEDURE — 3077F SYST BP >= 140 MM HG: CPT | Performed by: FAMILY MEDICINE

## 2022-09-19 PROCEDURE — 90471 IMMUNIZATION ADMIN: CPT

## 2022-09-19 NOTE — PATIENT INSTRUCTIONS

## 2022-09-19 NOTE — PROGRESS NOTES
[facility] PRACTICE    NAME: Artis Peña  AGE: 46 y o  SEX: male  : 1980     DATE: 2022     Assessment and Plan:     Problem List Items Addressed This Visit        Endocrine    Impaired fasting glucose    Relevant Orders    Comprehensive metabolic panel    HEMOGLOBIN A1C W/ EAG ESTIMATION       Respiratory    Asthma    JAMIE (obstructive sleep apnea)       Cardiovascular and Mediastinum    Hypertension       Other    ADHD (attention deficit hyperactivity disorder)    Morbid obesity with BMI of 40 0-44 9, adult ([address] )      Other Visit Diagnoses     Annual physical exam    -  Primary    Pt stable  Did urge [address] [place] [address] to consider getting an updated bivalent COV-19 vaccine  Acute left-sided low back pain with left-sided sciatica        Recent flare - treated with steroids  Xrays showing mild DJD, no acute findings  MRI ordered  Relevant Orders    MRI lumbar spine wo contrast    Anxiety and depression        Stable; on Escitalopram     Screening for cardiovascular condition        Relevant Orders    Lipid Panel with Direct LDL reflex    Immunization due        Flu Vaccine and [id number] given IM, and tolerated well  Relevant Orders    Pneumococcal Conjugate Vaccine 20-valent (Pcv20)    influenza vaccine, quadrivalent, recombinant, PF, 0 5 mL, for patients 18 yr+ (FLUBLOK)          Immunizations and preventive care screenings were discussed with patient today  Appropriate education was printed on patient's after visit summary  Counseling:  Dental Health: discussed importance of regular tooth brushing, flossing, and dental visits  · Exercise: the importance of regular exercise/physical activity was discussed  Recommend exercise 3-5 times per week for at least 30 minutes  Return in 6 months (on 3/19/2023) for Recheck       Chief Complaint:     Chief Complaint   Patient presents with    Physical Exam     Patient being seen for physical       History of Present Illness:     Adult Annual Physical   Patient here for a comprehensive physical exam  The patient reports no problems  Overall, Joe's back is doing better  Still with some residual tingling in the left great toe  Pt has done Chiropractics and PT  Has not completed FBW ordered yet  Is vaccinated against COV-19  Had Tdap in 2017  Diet and Physical Activity  · Diet/Nutrition: well balanced diet  · Exercise: 3-4 times a week on average  Depression Screening  PHQ-2/9 Depression Screening    Little interest or pleasure in doing things: 0 - not at all  Feeling down, depressed, or hopeless: 0 - not at all       [address] [place] [address]  · Sleep: sleeps well  · Hearing: normal - bilateral   · Vision: no vision problems  · Dental: regular dental visits   Health  · History of STDs?: no      Review of Systems:     Review of Systems   Constitutional: Negative for activity change  Respiratory: Negative for shortness of breath  Cardiovascular: Negative for chest pain  Gastrointestinal: Negative for abdominal pain and blood in stool  Genitourinary: Negative for decreased urine volume and difficulty urinating  Musculoskeletal: Positive for back pain  Psychiatric/Behavioral: Negative for decreased concentration and dysphoric mood  The patient is not nervous/anxious         Past Medical History:     Past Medical History:   Diagnosis Date    ADHD (attention deficit hyperactivity disorder)     CPAP (continuous positive airway pressure) dependence     Hypersomnolence     Hypertension     Hypoxia     Infrapatellar bursitis of right knee     Mononucleosis     Mycobacterial infectious disease     JAMIE (obstructive sleep apnea)     Pneumonia     Right upper lobe pneumonia 10/7/2020    Varicella       Past Surgical History:     Past Surgical History:   Procedure Laterality Date    BRONCHOSCOPY      CARPAL TUNNEL RELEASE Bilateral     CIRCUMCISION      TONSILLECTOMY      WISDOM TOOTH EXTRACTION      X1       Social History:     Social History     Socioeconomic History    Marital status: /Civil Union     Spouse name: jeronimo     Number of children: 1    Years of education: None    Highest education level: None   Occupational History    None   Tobacco Use    Smoking status: Never Smoker    Smokeless tobacco: Never Used   Vaping Use    Vaping Use: Former    Substances: Flavoring   Substance and Sexual Activity    Alcohol use: Yes     Comment: rare    Drug use: No    Sexual activity: Yes     Partners: Female   Other Topics Concern    None   Social History Narrative    Who lives in your home: Wife and son     What type of home do you live in: Single house    Age of your home: 1996     How long have you been living there: 2016    Type of heat: Forced hot air    Type of fuel: Oil    What type of kaur is in your bedroom: Hardwood floor    Do you have the following in or near your home:    [employer]: Central air, Air , [employer] and Dehumidifier    Pests: None    Pets: Dogs ([address]  and [place])     Are pets allowed in bedroom: Yes    Open fields, wooded areas nearby: Open fields and Wooded areas    Basement: Damp and Unfinished    Exposure to second hand smoke: No        Habits:    Caffeine: coffee 1 cup daily-soda and ice tea  minimally- hot tea when he's sick     Chocolate: 1x a month     Other:     Social Determinants of Health     Financial Resource Strain: Not on file   Food Insecurity: Not on file   Transportation Needs: Not on file   Physical Activity: Not on file   Stress: Not on file   Social Connections: Not on file   Intimate Partner Violence: Not on file   Housing Stability: Not on file      Family History:     Family History   Problem Relation Age of Onset    Hypertension Mother     Diabetes Mother         pre-diabetic     Heart attack Father     Diabetes Father     Hypertension Father     Obesity Father         hx LRYGB  Heart disease Father         hx MI age 45    Arthritis Family     Cancer Family     Cancer Paternal Grandmother         hx bladder cancer    Heart attack Paternal Grandfather     No Known Problems Son     Stroke Neg Hx     Thyroid disease Neg Hx       Current Medications:     Current Outpatient Medications   Medication Sig Dispense Refill    escitalopram (LEXAPRO) 10 mg tablet Take 1 tablet (10 mg total) by mouth daily 90 tablet 0    fexofenadine (ALLEGRA) 180 MG tablet Take 180 mg by mouth as needed        lisdexamfetamine (Vyvanse) 40 MG capsule Take 1 capsule (40 mg total) by mouth every morning Max Daily Amount: 40 mg 30 capsule 0    losartan (COZAAR) 100 MG tablet Take 1 tablet (100 mg total) by mouth daily 90 tablet 0    VITAMIN D PO Take by mouth daily      methylPREDNISolone 4 MG tablet therapy pack Use as directed on package (Patient not taking: Reported on 9/19/2022) 21 each 0     No current facility-administered medications for this visit  Allergies: Allergies   Allergen Reactions    Olmesartan-Amlodipine-Hctz Edema    Terazosin Edema      Physical Exam:     /80 (BP Location: Left arm, Patient Position: Sitting, Cuff Size: Large)   Pulse 87   Temp 98 1 °F (36 7 °C) (Tympanic)   Resp 16   Ht 6' 0 01" (1 829 m)   Wt (!) 148 kg (325 lb 12 8 oz)   SpO2 98%   BMI 44 18 kg/m²     Physical Exam  Vitals and nursing note reviewed  Constitutional:       General: He is not in acute distress  Appearance: Normal appearance  He is not ill-appearing, toxic-appearing or diaphoretic  HENT:      Head: Normocephalic and atraumatic  Eyes:      General: No scleral icterus  Conjunctiva/sclera: Conjunctivae normal    Cardiovascular:      Rate and Rhythm: Normal rate and regular rhythm  Heart sounds: Normal heart sounds  No murmur heard  No friction rub  No gallop  Pulmonary:      Effort: Pulmonary effort is normal  No respiratory distress        Breath sounds: Normal breath sounds  No stridor  No wheezing, rhonchi or rales  Abdominal:      General: Abdomen is flat  Bowel sounds are normal  There is no distension  Palpations: Abdomen is soft  There is no mass  Tenderness: There is no abdominal tenderness  There is no guarding or rebound  Musculoskeletal:      Cervical back: Normal range of motion and neck supple  No rigidity or tenderness  Lymphadenopathy:      Cervical: No cervical adenopathy  Neurological:      Mental Status: He is alert and oriented to person, place, and time  Psychiatric:         Mood and Affect: Mood normal          Behavior: Behavior normal          Thought Content: Thought content normal          Judgment: Judgment normal           Justice Del Rio was seen today for physical exam     Diagnoses and all orders for this visit:    Annual physical exam  Comments:  Pt stable  Did urge Justice Del Rio to consider getting an updated bivalent COV-19 vaccine  Acute left-sided low back pain with left-sided sciatica  Comments:  Recent flare - treated with steroids  Xrays showing mild DJD, no acute findings  MRI ordered  Orders:  -     MRI lumbar spine wo contrast; Future    Impaired fasting glucose  Comments:  Stable at this time  Pt to continue a lower carb/salt diet, and regular, light exercise  FBW with A1c again ordered for the pt  Orders:  -     Comprehensive metabolic panel; Future  -     HEMOGLOBIN A1C W/ EAG ESTIMATION; Future    Primary hypertension  Comments:  Controlled with Losartan  JAMIE (obstructive sleep apnea)  Comments:  Stable at this time; on CPAP  Continues to work with Pulmonology/Sleep Med  Mild intermittent asthma without complication  Comments:  Stable; as above  Attention deficit hyperactivity disorder (ADHD), predominantly inattentive type  Comments:  Stable; on Vyvanse  Continues f/u with Psychiatry  [place] PDMP was checked today      Anxiety and depression  Comments:  Stable; on Escitalopram     Screening for cardiovascular condition  -     Lipid Panel with Direct LDL reflex; Future    Morbid obesity with BMI of 40 0-44 9, adult ([facility] [address] )  Comments:  Diet and exercise as above  Immunization due  Comments:  Flu Vaccine and [id number] given IM, and tolerated well    Orders:  -     Pneumococcal Conjugate Vaccine 20-valent (Pcv20)  -     influenza vaccine, quadrivalent, recombinant, PF, 0 5 mL, for patients 18 yr+ (FLUBLOK)          Bk Hardwick, DO   [facility]

## 2022-10-07 ENCOUNTER — PATIENT MESSAGE (OUTPATIENT)
Dept: PULMONOLOGY | Facility: CLINIC | Age: 42
End: 2022-10-07

## 2022-10-11 ENCOUNTER — TELEPHONE (OUTPATIENT)
Dept: PULMONOLOGY | Facility: HOSPITAL | Age: 42
End: 2022-10-11

## 2022-10-11 NOTE — TELEPHONE ENCOUNTER
Responded to patient's message  He has been set up for a Virtual Compliance Follow up on 11/08/2022  Will be going through my chart for his virtual appointment

## 2022-10-31 ENCOUNTER — HOSPITAL ENCOUNTER (OUTPATIENT)
Dept: RADIOLOGY | Facility: HOSPITAL | Age: 42
Discharge: HOME | End: 2022-10-31

## 2022-10-31 DIAGNOSIS — M54.42 ACUTE BACK PAIN WITH SCIATICA, LEFT: ICD-10-CM

## 2022-10-31 DIAGNOSIS — M54.42 ACUTE LEFT-SIDED LOW BACK PAIN WITH LEFT-SIDED SCIATICA: ICD-10-CM

## 2022-10-31 NOTE — RESULT ENCOUNTER NOTE
Please call the patient regarding his abnormal result:  Joe's pre-MRI xrays done showed no signs of metal debris (he is fine for his upcoming MRI)  There was some cloudiness in the region of the frontal sinuses seen; may be nothing - any sinus pain right now / signs of sinusitis? Thanks    Manuel

## 2022-11-03 DIAGNOSIS — M54.16 LUMBAR BACK PAIN WITH RADICULOPATHY AFFECTING LEFT LOWER EXTREMITY: Primary | ICD-10-CM

## 2022-11-03 DIAGNOSIS — N28.1 RENAL CYST, RIGHT: ICD-10-CM

## 2022-11-08 ENCOUNTER — OFFICE VISIT (OUTPATIENT)
Dept: PULMONOLOGY | Facility: MEDICAL CENTER | Age: 42
End: 2022-11-08

## 2022-11-08 VITALS — TEMPERATURE: 98.6 F | HEIGHT: 73 IN | WEIGHT: 315 LBS | BODY MASS INDEX: 41.75 KG/M2

## 2022-11-08 DIAGNOSIS — G47.33 OSA (OBSTRUCTIVE SLEEP APNEA): Primary | ICD-10-CM

## 2022-11-08 NOTE — PROGRESS NOTES
Virtual Regular Visit    Verification of patient location:    Patient is located in the following state in which I hold an active license [place]      Assessment/Plan:  42 y/o M with PMHx of JAMIE on autoPAP, HTN and ADHD on Vyvanse who comes in for follow up of JAMIE  1    JAMIE previously on autoCPAP 7-15 but had difficulty with tolerance  Now on autoBIPAP with good clinical response but suboptimal compliance  Residual AHI - 1 0       -  I again encouraged him to be a bit more consistent with the BIPAP  He will do his best to do so  -  Continue autoBIPAP with min EPAP of 8, PSV of 4, IPAP 25  Follow compliance in 6 months         -  We will attempt a nasal CPAP instead to see if this is more comfortable for him and will encourage use  If not he can return to his full face mask        2   Excessive daytime sleepiness despite CPAP  Hx of ADHD      -  Management of JAMIE as below      -  Continue Vyvanse 40mg PO Daily for ADHD  It would be ideal if he could further wean the dose in the future        3    Periodic limb movement (PLM index - 24)   Iron was normal but mildly reduced magnesium  He feels that these symptoms have improved  Right now he only has issues with leg cramps          -  Treat JAMIE as above       -  No additional therapy at this time  Problem List Items Addressed This Visit        Respiratory    JAMIE (obstructive sleep apnea) - Primary    Relevant Orders    PAP DME Resupply/Reorder               Reason for visit is   Chief Complaint   Patient presents with   • Virtual Regular Visit        Encounter provider Arne Spatz, DO    Provider located at [address] [place] [id number]      Recent Visits  No visits were found meeting these conditions    Showing recent visits within past 7 days and meeting all other requirements  Today's Visits  Date Type Provider Dept   11/08/22 Office Visit Arne Spatz, DO [facility]   Showing today's visits and meeting all other requirements  Future Appointments  No visits were found meeting these conditions  Showing future appointments within next 150 days and meeting all other requirements       The patient was identified by name and date of birth  Trang uBtt was informed that this is a telemedicine visit and that the visit is being conducted through [facility] and patient was informed that this is not a secure platform He agrees to proceed     My office door was closed  No one else was in the room  He acknowledged consent and understanding of privacy and security of the video platform  The patient has agreed to participate and understands they can discontinue the visit at any time  Patient is aware this is a billable service  Subjective  Trang Butt is a 46 y o  male who calls in to discuss his JAMIE  HPI   Mr Los Naik states that he is doing well with his sleep considering he has a new baby  He has been using his BIPAP but is not as consistent with it as he likes  He has been having issues with mask leak with the F30i and would like to consider a different mask  He denies morning headache or dry mouth       Past Medical History:   Diagnosis Date   • ADHD (attention deficit hyperactivity disorder)    • CPAP (continuous positive airway pressure) dependence    • Hypersomnolence    • Hypertension    • Hypoxia    • Infrapatellar bursitis of right knee    • Mononucleosis    • Mycobacterial infectious disease    • JAMIE (obstructive sleep apnea)    • Pneumonia    • Right upper lobe pneumonia 10/7/2020   • Varicella        Past Surgical History:   Procedure Laterality Date   • BRONCHOSCOPY     • CARPAL TUNNEL RELEASE Bilateral    • CIRCUMCISION     • TONSILLECTOMY     • WISDOM TOOTH EXTRACTION      X1        Current Outpatient Medications   Medication Sig Dispense Refill   • escitalopram (LEXAPRO) 10 mg tablet Take 1 tablet (10 mg total) by mouth daily 90 tablet 3   • fexofenadine (ALLEGRA) 180 MG tablet Take 180 mg by mouth as needed       • lisdexamfetamine (Vyvanse) 40 MG capsule Take 1 capsule (40 mg total) by mouth every morning Max Daily Amount: 40 mg 30 capsule 0   • losartan (COZAAR) 100 MG tablet Take 1 tablet (100 mg total) by mouth daily 90 tablet 3   • methylPREDNISolone 4 MG tablet therapy pack Use as directed on package (Patient not taking: Reported on 9/19/2022) 21 each 0   • VITAMIN D PO Take by mouth daily       No current facility-administered medications for this visit  Allergies   Allergen Reactions   • Olmesartan-Amlodipine-Hctz Edema   • Terazosin Edema       Review of Systems   Constitutional: Positive for fatigue  Negative for appetite change  HENT: Negative  Eyes: Negative  Respiratory: Negative  Cardiovascular: Negative  Gastrointestinal: Negative  Genitourinary: Negative  Musculoskeletal: Negative  Skin: Negative  Allergic/Immunologic: Negative  Neurological: Negative  Psychiatric/Behavioral: Negative  Video Exam    Vitals:    11/08/22 1219   Temp: 98 6 °F (37 °C)   TempSrc: Oral   Weight: (!) 147 kg (325 lb)   Height: 6' 1" (1 854 m)       Physical Exam   General: Pleasant, Awake alert and oriented x 3, conversant without conversational dyspnea, NAD, normalaffect  HEENT:  PERRL, Sclera noninjected, nonicteric OU, Nares patent,  no craniofacial abnormalities, Mucous membranes, moist, no oral lesions, normal dentition  NECK: Trachea midline, no accessory muscle use, no stridor  CARDIAC, PULM and ABD: Could not be performed on video visit  NEURO: no focal neurologic deficits, AAOx3, moving all extremities appropriately    Diagnostic Data:  Labs: I personally reviewed the most recent laboratory data pertinent to today's visit  I have personally reviewed pertinent lab results    Lab Results   Component Value Date    WBC 6 22 11/07/2020    HGB 14 0 11/07/2020    HCT 43 4 11/07/2020    MCV 86 11/07/2020     11/07/2020     Lab Results   Component Value Date    GLUCOSE 122 02/10/2016    CALCIUM 9 0 11/05/2021    K 4 2 11/05/2021    CO2 28 11/05/2021     11/05/2021    BUN 16 11/05/2021    CREATININE 0 73 11/05/2021     No results found for: IGE  Lab Results   Component Value Date    ALT 62 11/05/2021    AST 45 11/05/2021    ALKPHOS 94 11/05/2021     Lab Results   Component Value Date    IRON 77 08/22/2020    TIBC 343 08/22/2020    FERRITIN 133 08/22/2020     Sleep studies:  CPAP titration study 8/19/20  Mr  Ezio Cortes underwent titration of CPAP starting at 9 cc of H2O pressure  He demonstrated a normal sleep  latency and delayed REM latency arguing against hypersomnia  Sleep staging demonstrated increased REM sleep  His respiratory events were well controlled  He had a few events on CPAP 9 so he was titrated up to 10 cc of H2O  pressure  He did well at that pressure  Therefore, I recommend a switch to autoCPAP 10-20 cc of H2O pressure  with heated humidification  TCO2 monitoring was performed, while awake his TCO2 was 42- 44  It increased  during REM sleep to 51- 53  He only had a brief episode of hypoxia but was otherwise well controlled  He did demonstrate frequent limb movements (PLM index - 29 4)  I would check iron and magnesium levels   I would consider a trial of Neurontin, Lyrica, Mirapex or Requip      Compliance Data:  Type of CPAP:  autoPAP 10-20, mean pressure 11, max 15                                   Percent usage: 100%                                   Average time used: 5 hrs 24 mins                                   Residual AHI: 1 8     Compliance Data:  10/5/21 - 11/3/21                                  Type of CPAP:  autoPAP 10-20, mean 10 7, max 13 5                                   Average time used: 5 hrs 27 mins                                   Residual AHI: 1 8     Compliance Data: 1/8/22 - 2/6/22                                    Type of CPAP:  autoBIPAP with min EPAP 8, PSV 4, IPAP 25                                   Percent usage: 93%, 73% for > 4 hrs                                   Average time used: 5 hrs 11 minutes                                   Residual AHI: 1 8                                      New Compliance Data: 10/8/22 - 11/6/22                                    Type of CPAP:  autoBIPAP with min EPAP 8, PSV 4, IPAP 25                                   Percent usage: 100%, 63% for > 4 hrs                                   Average time used: 4 hrs 22 minutes                                   Residual AHI: 1 0      I spent 15 minutes directly with the patient during this visit       Cameron Rivas

## 2022-11-08 NOTE — PROGRESS NOTES
Progress Note - Sleep Medicine  Sterling Campbell 46 y o  male MRN: [id number]       Impression & Plan:   40 y/o M with PMHx of JAMIE on autoPAP, HTN and ADHD on Vyvanse who comes in for follow up of JAMIE  1    JAMIE previously on autoCPAP 7-15 but had difficulty with tolerance  No he is on autoBIPAP with good compliance and clinical response  Residual AHI - 1 8       -  I encouraged him to be a bit more consistent with use  -  Continue autoBIPAP with min EPAP of 8, PSV of 4, IPAP 25  Follow compliance in 6 months         -  He will attempt a different size F30i interface to see if it prevents mask leak       2   Excessive daytime sleepiness despite CPAP  Hx of ADHD      -  Management of JAMIE as below      -  Continue Vyvanse 50mg PO Daily for ADHD and sleepiness at current dose for now  Rx written      -  I discussed the possibility of weaning down the dose given clinical improvement with BIPAP  He previously required metoprolol        3    Periodic limb movement (PLM index - 24)  Sauravodette Jamesger was normal but mildly reduced magnesium       -  Treat JAMIE as above       -  We did discuss different pharmacologic therapies in the past but he would like to hold on therapy for now          ______________________________________________________________________    HPI:    Sterling Campbell presents today for follow-up of ***        Review of Systems:  Review of Systems      Social history updates:  Social History     Tobacco Use   Smoking Status Never Smoker   Smokeless Tobacco Never Used     Social History     Socioeconomic History   • Marital status: /Civil Union     Spouse name: jeronimo    • Number of children: 1   • Years of education: Not on file   • Highest education level: Not on file   Occupational History   • Not on file   Tobacco Use   • Smoking status: Never Smoker   • Smokeless tobacco: Never Used   Vaping Use   • Vaping Use: Former   • Substances: Flavoring   Substance and Sexual Activity   • Alcohol use:  Yes Comment: rare   • Drug use: No   • Sexual activity: Yes     Partners: Female   Other Topics Concern   • Not on file   Social History Narrative    Who lives in your home: Wife and son     What type of home do you live in: Single house    Age of your home: 1996     How long have you been living there: 2016    Type of heat: Forced hot air    Type of fuel: Oil    What type of kaur is in your bedroom: Hardwood floor    Do you have the following in or near your home:    Air products: Central air, Air , [employer] and Dehumidifier    Pests: None    Pets: Dogs ([address]  and Genie)     Are pets allowed in bedroom: Yes    Open fields, wooded areas nearby: Open fields and Wooded areas    Basement: Damp and Unfinished    Exposure to second hand smoke: No        Habits:    Caffeine: coffee 1 cup daily-soda and ice tea  minimally- hot tea when he's sick     Chocolate: 1x a month     Other:     Social Determinants of Health     Financial Resource Strain: Not on file   Food Insecurity: Not on file   Transportation Needs: Not on file   Physical Activity: Not on file   Stress: Not on file   Social Connections: Not on file   Intimate Partner Violence: Not on file   Housing Stability: Not on file       PhysicalExamination:  Vitals:   Temp 98 6 °F (37 °C) (Oral)   Ht 6' 1" (1 854 m)   Wt (!) 147 kg (325 lb)   BMI 42 88 kg/m²     ***    Diagnostic Data:  Labs:   I personally reviewed the most recent laboratory data pertinent to today's visit  {Recent XZIF:73225::"IPX applicable"}    {[facility] pul labs:89559}  Lab Results   Component Value Date    WBC 6 22 11/07/2020    HGB 14 0 11/07/2020    HCT 43 4 11/07/2020    MCV 86 11/07/2020     11/07/2020     Lab Results   Component Value Date    GLUCOSE 122 02/10/2016    CALCIUM 9 0 11/05/2021    K 4 2 11/05/2021    CO2 28 11/05/2021     11/05/2021    BUN 16 11/05/2021    CREATININE 0 73 11/05/2021     No results found for: IGE  Lab Results   Component Value Date    ALT 62 11/05/2021    AST 45 11/05/2021    ALKPHOS 94 11/05/2021     Lab Results   Component Value Date    IRON 77 08/22/2020    TIBC 343 08/22/2020    FERRITIN 133 08/22/2020     Sleep studies:  CPAP titration study 8/19/20  Mr Shelley Llanes underwent titration of CPAP starting at 9 cc of H2O pressure  He demonstrated a normal sleep  latency and delayed REM latency arguing against hypersomnia  Sleep staging demonstrated increased REM sleep  His respiratory events were well controlled  He had a few events on CPAP 9 so he was titrated up to 10 cc of H2O  pressure  He did well at that pressure  Therefore, I recommend a switch to autoCPAP 10-20 cc of H2O pressure  with heated humidification  TCO2 monitoring was performed, while awake his TCO2 was 42- 44  It increased  during REM sleep to 51- 53  He only had a brief episode of hypoxia but was otherwise well controlled  He did demonstrate frequent limb movements (PLM index - 29 4)  I would check iron and magnesium levels   I would consider a trial of Neurontin, Lyrica, Mirapex or Requip      Compliance Data:  Type of CPAP:  autoPAP 10-20, mean pressure 11, max 15                                   Percent usage: 100%                                   Average time used: 5 hrs 24 mins                                   Residual AHI: 1 8     Compliance Data:  10/5/21 - 11/3/21                                  Type of CPAP:  autoPAP 10-20, mean 10 7, max 13 5                                   Average time used: 5 hrs 27 mins                                   Residual AHI: 1 8     New Compliance Data: 1/8/22 - 2/6/22                                    Type of CPAP:  autoBIPAP with min EPAP 8, PSV 4, IPAP 25                                   Percent usage: 93%, 73% for > 4 hrs                                   Average time used: 5 hrs 11 minutes                                   Residual AHI: 1 8                                        Abner Padgett

## 2022-11-08 NOTE — LETTER
November 8, 2022     Misael Sage, [address] [facility] [address] [place] [address]  [address] [address]    Patient: Tonja Garcia   YOB: 1980   Date of Visit: 11/8/2022       Dear Dr Hua Reilly:    Thank you for referring Tonja Garcia to me for evaluation  Below are my notes for this consultation  If you have questions, please do not hesitate to call me  I look forward to following your patient along with you  Sincerely,        Render Anon, DO        CC: No Recipients  Render Anon, DO  11/8/2022  4:06 PM  Sign when Signing Visit  Virtual Regular Visit    Verification of patient location:    Patient is located in the following state in which I hold an active license [place]      Assessment/Plan:  42 y/o M with PMHx of JAMIE on autoPAP, HTN and ADHD on Vyvanse who comes in for follow up of JAMIE  1    JAMIE previously on autoCPAP 7-15 but had difficulty with tolerance  Now on autoBIPAP with good clinical response but suboptimal compliance  Residual AHI - 1 0       -  I again encouraged him to be a bit more consistent with the BIPAP  He will do his best to do so  -  Continue autoBIPAP with min EPAP of 8, PSV of 4, IPAP 25  Follow compliance in 6 months         -  We will attempt a nasal CPAP instead to see if this is more comfortable for him and will encourage use  If not he can return to his full face mask        2   Excessive daytime sleepiness despite CPAP  Hx of ADHD      -  Management of JAMIE as below      -  Continue Vyvanse 40mg PO Daily for ADHD  It would be ideal if he could further wean the dose in the future        3    Periodic limb movement (PLM index - 24)   Iron was normal but mildly reduced magnesium  He feels that these symptoms have improved  Right now he only has issues with leg cramps          -  Treat JAMIE as above       -  No additional therapy at this time        Problem List Items Addressed This Visit        Respiratory    JAMIE (obstructive sleep apnea) - Primary    Relevant Orders    PAP DME Resupply/Reorder               Reason for visit is   Chief Complaint   Patient presents with   • Virtual Regular Visit        Encounter provider Mary Velazco DO    Provider located at [address] [place] [address] Road [id number]      Recent Visits  No visits were found meeting these conditions  Showing recent visits within past 7 days and meeting all other requirements  Today's Visits  Date Type Provider Dept   11/08/22 Office Visit Mary Velazco DO [facility]   Showing today's visits and meeting all other requirements  Future Appointments  No visits were found meeting these conditions  Showing future appointments within next 150 days and meeting all other requirements       The patient was identified by name and date of birth  Kaley Medrano was informed that this is a telemedicine visit and that the visit is being conducted through [facility] and patient was informed that this is not a secure platform He agrees to proceed     My office door was closed  No one else was in the room  He acknowledged consent and understanding of privacy and security of the video platform  The patient has agreed to participate and understands they can discontinue the visit at any time  Patient is aware this is a billable service  Subjective  Kaley Medrano is a 46 y o  male who calls in to discuss his JAMIE  HPI   Mr  Inocencio Gonzalez states that he is doing well with his sleep considering he has a new baby  He has been using his BIPAP but is not as consistent with it as he likes  He has been having issues with mask leak with the F30i and would like to consider a different mask  He denies morning headache or dry mouth       Past Medical History:   Diagnosis Date   • ADHD (attention deficit hyperactivity disorder)    • CPAP (continuous positive airway pressure) dependence    • Hypersomnolence    • Hypertension    • Hypoxia    • Infrapatellar bursitis of right knee    • Mononucleosis    • Mycobacterial infectious disease    • JAMIE (obstructive sleep apnea)    • Pneumonia    • Right upper lobe pneumonia 10/7/2020   • Varicella        Past Surgical History:   Procedure Laterality Date   • BRONCHOSCOPY     • CARPAL TUNNEL RELEASE Bilateral    • CIRCUMCISION     • TONSILLECTOMY     • WISDOM TOOTH EXTRACTION      X1        Current Outpatient Medications   Medication Sig Dispense Refill   • escitalopram (LEXAPRO) 10 mg tablet Take 1 tablet (10 mg total) by mouth daily 90 tablet 3   • fexofenadine (ALLEGRA) 180 MG tablet Take 180 mg by mouth as needed       • lisdexamfetamine (Vyvanse) 40 MG capsule Take 1 capsule (40 mg total) by mouth every morning Max Daily Amount: 40 mg 30 capsule 0   • losartan (COZAAR) 100 MG tablet Take 1 tablet (100 mg total) by mouth daily 90 tablet 3   • methylPREDNISolone 4 MG tablet therapy pack Use as directed on package (Patient not taking: Reported on 9/19/2022) 21 each 0   • VITAMIN D PO Take by mouth daily       No current facility-administered medications for this visit  Allergies   Allergen Reactions   • Olmesartan-Amlodipine-Hctz Edema   • Terazosin Edema       Review of Systems   Constitutional: Positive for fatigue  Negative for appetite change  HENT: Negative  Eyes: Negative  Respiratory: Negative  Cardiovascular: Negative  Gastrointestinal: Negative  Genitourinary: Negative  Musculoskeletal: Negative  Skin: Negative  Allergic/Immunologic: Negative  Neurological: Negative  Psychiatric/Behavioral: Negative          Video Exam    Vitals:    11/08/22 1219   Temp: 98 6 °F (37 °C)   TempSrc: Oral   Weight: (!) 147 kg (325 lb)   Height: 6' 1" (1 854 m)       Physical Exam   General: Pleasant, Awake alert and oriented x 3, conversant without conversational dyspnea, NAD, normalaffect  HEENT:  PERRL, Sclera noninjected, nonicteric OU, Nares patent, no craniofacial abnormalities, Mucous membranes, moist, no oral lesions, normal dentition  NECK: Trachea midline, no accessory muscle use, no stridor  CARDIAC, PULM and ABD: Could not be performed on video visit  NEURO: no focal neurologic deficits, AAOx3, moving all extremities appropriately    Diagnostic Data:  Labs: I personally reviewed the most recent laboratory data pertinent to today's visit  I have personally reviewed pertinent lab results  Lab Results   Component Value Date    WBC 6 22 11/07/2020    HGB 14 0 11/07/2020    HCT 43 4 11/07/2020    MCV 86 11/07/2020     11/07/2020     Lab Results   Component Value Date    GLUCOSE 122 02/10/2016    CALCIUM 9 0 11/05/2021    K 4 2 11/05/2021    CO2 28 11/05/2021     11/05/2021    BUN 16 11/05/2021    CREATININE 0 73 11/05/2021     No results found for: IGE  Lab Results   Component Value Date    ALT 62 11/05/2021    AST 45 11/05/2021    ALKPHOS 94 11/05/2021     Lab Results   Component Value Date    IRON 77 08/22/2020    TIBC 343 08/22/2020    FERRITIN 133 08/22/2020     Sleep studies:  CPAP titration study 8/19/20  Mr Megan Xie underwent titration of CPAP starting at 9 cc of H2O pressure  He demonstrated a normal sleep  latency and delayed REM latency arguing against hypersomnia  Sleep staging demonstrated increased REM sleep  His respiratory events were well controlled  He had a few events on CPAP 9 so he was titrated up to 10 cc of H2O  pressure  He did well at that pressure  Therefore, I recommend a switch to autoCPAP 10-20 cc of H2O pressure  with heated humidification  TCO2 monitoring was performed, while awake his TCO2 was 42- 44  It increased  during REM sleep to 51- 53  He only had a brief episode of hypoxia but was otherwise well controlled  He did demonstrate frequent limb movements (PLM index - 29 4)  I would check iron and magnesium levels   I would consider a trial of Neurontin, Lyrica, Mirapex or Requip      Compliance Data:  Type of CPAP:  autoPAP 10-20, mean pressure 11, max 15                                   Percent usage: 100%                                   Average time used: 5 hrs 24 mins                                   Residual AHI: 1 8     Compliance Data:  10/5/21 - 11/3/21                                  Type of CPAP:  autoPAP 10-20, mean 10 7, max 13 5                                   Average time used: 5 hrs 27 mins                                   Residual AHI: 1 8     Compliance Data: 1/8/22 - 2/6/22                                    Type of CPAP:  autoBIPAP with min EPAP 8, PSV 4, IPAP 25                                   Percent usage: 93%, 73% for > 4 hrs                                   Average time used: 5 hrs 11 minutes                                   Residual AHI: 1 8                                      New Compliance Data: 10/8/22 - 11/6/22                                    Type of CPAP:  autoBIPAP with min EPAP 8, PSV 4, IPAP 25                                   Percent usage: 100%, 63% for > 4 hrs                                   Average time used: 4 hrs 22 minutes                                   Residual AHI: 1 0      I spent 15 minutes directly with the patient during this visit       Li Sandy

## 2022-11-14 ENCOUNTER — CONSULT (OUTPATIENT)
Dept: NEUROSURGERY | Facility: CLINIC | Age: 42
End: 2022-11-14

## 2022-11-14 VITALS
HEIGHT: 73 IN | SYSTOLIC BLOOD PRESSURE: 138 MMHG | WEIGHT: 315 LBS | BODY MASS INDEX: 41.75 KG/M2 | DIASTOLIC BLOOD PRESSURE: 70 MMHG | TEMPERATURE: 98.7 F | RESPIRATION RATE: 18 BRPM | HEART RATE: 74 BPM

## 2022-11-14 DIAGNOSIS — M54.16 LUMBAR BACK PAIN WITH RADICULOPATHY AFFECTING LEFT LOWER EXTREMITY: ICD-10-CM

## 2022-11-14 NOTE — PROGRESS NOTES
Office Note - Neurosurgery   Lizzie Garcia 38 y o  male MRN: [id number]      Assessment:    Patient is a 38years old gentleman with past medical history of pneumonia, asthma, JAMIE -on BiBAP at night, hypertension, ADHD, COVID-19 infection, periodic limb movement, morbid obesity his PCP for evaluation of chronic progressive lower back pain with left leg radiculopathy  Patient is a  was job involves lifting heavy objects under manual work  He reports his pain get worse over the past 6-9 months  Also she has not left posterolateral thigh radicular pain, onto his outer calf region on the left and to the left dorsal foot  He has associated weakness with left foot dorsiflexion and numbness and paresthesia of the left dorsal big toe and 2nd toe  Patient also noticed occasional right foot paresthesia  He reports Kevin-horse type pain in his left posterior thigh  No B/B dysfunction nor saddle anaesthesia  He tried PT in the past with some improvement, had also tried medrol pack and noticed temporary relief  Denies MK  Exam-Morbidly Obese , Maria L, strength 5/5 in the right LE  Left DF 4/5, sensation to LT decreased in the left dorsal big toe toe and left dorsal left 2nd toe  DTR 2+   No clonus bilaterally  Tenderness in the left LS region  Gait instability noted on heel to toes walking  MRI of Lumbar spine done on 10/31/2022 demonstrates multilevel spondylosis most pronounced at L3-4 there is left neural foraminal disc protrusion with severe left neural foraminal narrowing and severe central canal narrowing  At L4-5 there is Foraminal disc protrusion with bilateral facet hypertrophy moderate left and right neural foraminal narrowing  Hx, PEx, and images reviewed with the patient  Mx plan discussed  Patient has some objective findings of left DF weakness  He reports feeling somewhat better with PT and medrol pack in the past  I would let him try MK and PT  F/U in 2 months, SNPx Dr Juan Miguel Day   Advised to call office with worsening symptoms  Fall precautions, avoid lifting heavy objects, excessive bending or twisting  All questions and concerns were answered to patient's satisfaction  Patient expressed his understandings and agreed with the plan  Plan:  1  Ambulatory referral to pain management possible left L4-5 MK  2  Ambulatory referral to PT  3  Follow-up in 2 weeks, SNPx Dr Checo Taylor  4  Call if symptoms get worse  5  Call with questions or concerns        Subjective/Objective     C/C: " Lower back pain with LLE radiculopathy"        HPI  The patient is a 38years old gentleman with past medical history of pneumonia, asthma, JAMIE -on BiBAP at night, hypertension, ADHD, COVID-19 infection, periodic limb movement, morbid obesity his PCP for evaluation of chronic progressive lower back pain with left leg radiculopathy  Patient is a  was job involves lifting heavy objects under manual work  He reports his pain get worse over the past 6-9 months  Also she has not left posterolateral thigh radicular pain, onto his outer calf region on the left and to the left dorsal foot  He has associated weakness with left foot dorsiflexion and numbness and paresthesia of the left dorsal big toe and 2nd toe  Patient also noticed occasional right foot paresthesia  He reports Kevin-horse type pain in his left posterior thigh  No B/B dysfunction nor saddle anaesthesia  He tried PT in the past with some improvement, had also tried medrol pack and noticed temporary relief  Denies MK  Denies Hx of fever, chills, rigors, cough or chest pain  Patient denies Hx of DM, CHF, Stroke, seizures, bleeding disorder or taking anticoagulant meds  Denies Hx of smoking cigarettes, drinking alcohol or use of illicit drugs  Images results as described in the assessment section above  ROS  Review of systems was personally reviewed and updated  Review of Systems   Genitourinary: Positive for frequency     Musculoskeletal: Positive for back pain (left Leg Pain wraps around left leg/ side of Buttock to front of shin) and gait problem (durning flare ups )  Consult-no previous sx  back pain w/ L Leg pain/side buttock & L toes/foot numbness- L leg weakness & difficult walk x 2 years-getting worse last 6mon    PT x 2 yrs ago (some relief at that time, pain has increased since)     MK/PM none    Neurological: Positive for weakness (left Leg) and numbness (& left Toes (Big toe)/ & bottom of Foot)  Hematological:        Neg AC    All other systems reviewed and are negative        Family History    Family History   Problem Relation Age of Onset   • Hypertension Mother    • Diabetes Mother         pre-diabetic    • Heart attack Father    • Diabetes Father    • Hypertension Father    • Obesity Father         hx LRYGB   • Heart disease Father         hx MI age 36   • Arthritis Family    • Cancer Family    • Cancer Paternal Grandmother         hx bladder cancer   • Heart attack Paternal Grandfather    • No Known Problems Son    • Stroke Neg Hx    • Thyroid disease Neg Hx        Social History    Social History     Socioeconomic History   • Marital status: /Civil Union     Spouse name: jeronimo    • Number of children: 1   • Years of education: Not on file   • Highest education level: Not on file   Occupational History   • Not on file   Tobacco Use   • Smoking status: Never Smoker   • Smokeless tobacco: Never Used   Vaping Use   • Vaping Use: Former   • Substances: Flavoring   Substance and Sexual Activity   • Alcohol use: Yes     Comment: rare   • Drug use: No   • Sexual activity: Yes     Partners: Female   Other Topics Concern   • Not on file   Social History Narrative    Who lives in your home: Wife and son     What type of home do you live in: Single house    Age of your home: 1996     How long have you been living there: 2016    Type of heat: Forced hot air    Type of fuel: Oil    What type of kaur is in your bedroom: [place] floor Do you have the following in or near your home:    Air products: Central air, Air , [employer] and Dehumidifier    Pests: None    Pets: Dogs ([address]  and Genie)     Are pets allowed in bedroom: Yes    Open fields, wooded areas nearby: Open fields and Wooded areas    Basement: Damp and Unfinished    Exposure to second hand smoke: No        Habits:    Caffeine: coffee 1 cup daily-soda and ice tea  minimally- hot tea when he's sick     Chocolate: 1x a month     Other:     Social Determinants of Health     Financial Resource Strain: Not on file   Food Insecurity: Not on file   Transportation Needs: Not on file   Physical Activity: Not on file   Stress: Not on file   Social Connections: Not on file   Intimate Partner Violence: Not on file   Housing Stability: Not on file       Past Medical History    Past Medical History:   Diagnosis Date   • ADHD (attention deficit hyperactivity disorder)    • CPAP (continuous positive airway pressure) dependence    • Hypersomnolence    • Hypertension    • Hypoxia    • Infrapatellar bursitis of right knee    • Mononucleosis    • Mycobacterial infectious disease    • JAMIE (obstructive sleep apnea)    • Pneumonia    • Right upper lobe pneumonia 10/7/2020   • Varicella        Surgical History    Past Surgical History:   Procedure Laterality Date   • BRONCHOSCOPY     • CARPAL TUNNEL RELEASE Bilateral    • CIRCUMCISION     • TONSILLECTOMY     • WISDOM TOOTH EXTRACTION      X1        Medications      Current Outpatient Medications:   •  escitalopram (LEXAPRO) 10 mg tablet, Take 1 tablet (10 mg total) by mouth daily, Disp: 90 tablet, Rfl: 3  •  fexofenadine (ALLEGRA) 180 MG tablet, Take 180 mg by mouth as needed  , Disp: , Rfl:   •  lisdexamfetamine (Vyvanse) 40 MG capsule, Take 1 capsule (40 mg total) by mouth every morning Max Daily Amount: 40 mg, Disp: 30 capsule, Rfl: 0  •  losartan (COZAAR) 100 MG tablet, Take 1 tablet (100 mg total) by mouth daily, Disp: 90 tablet, Rfl: 3  • methylPREDNISolone 4 MG tablet therapy pack, Use as directed on package (Patient not taking: Reported on 9/19/2022), Disp: 21 each, Rfl: 0  •  VITAMIN D PO, Take by mouth daily, Disp: , Rfl:     Allergies    Allergies   Allergen Reactions   • Olmesartan-Amlodipine-Hctz Edema   • Terazosin Edema       The following portions of the patient's history were reviewed and updated as appropriate: allergies, current medications, past family history, past medical history, past social history, past surgical history and problem list     Investigations    I personally reviewed the MRI results with the patient:        Physical Exam    There were no vitals filed for this visit  Physical Exam  Constitutional:       Appearance: He is obese  HENT:      Head: Normocephalic and atraumatic  Eyes:      Extraocular Movements: Extraocular movements intact  Cardiovascular:      Rate and Rhythm: Normal rate and regular rhythm  Pulses: Normal pulses  Pulmonary:      Effort: Pulmonary effort is normal    Musculoskeletal:         General: Tenderness present  Cervical back: Normal range of motion  Neurological:      Mental Status: He is alert  GCS: GCS eye subscore is 4  GCS verbal subscore is 5  GCS motor subscore is 6  Cranial Nerves: Cranial nerves are intact  Sensory: Sensory deficit present  Motor: Weakness present  Coordination: Finger-Nose-Finger Test normal       Deep Tendon Reflexes: Reflexes are normal and symmetric  Reflex Scores:       Tricep reflexes are 2+ on the right side and 2+ on the left side  Bicep reflexes are 2+ on the right side and 2+ on the left side  Brachioradialis reflexes are 2+ on the right side and 2+ on the left side  Patellar reflexes are 2+ on the right side and 2+ on the left side  Achilles reflexes are 2+ on the right side and 2+ on the left side    Psychiatric:         Speech: Speech normal        Neurologic Exam     Mental Status Speech: speech is normal   Level of consciousness: alert    Cranial Nerves     CN III, IV, VI   Nystagmus: none     CN XI   CN XI normal      Motor Exam   Muscle bulk: normal  Overall muscle tone: normal  Right arm tone: normal  Left arm tone: normal  Right arm pronator drift: absent  Left arm pronator drift: absent  Right leg tone: normal  Left leg tone: normal    Sensory Exam   Light touch normal      Gait, Coordination, and Reflexes     Coordination   Finger to nose coordination: normal    Reflexes   Right brachioradialis: 2+  Left brachioradialis: 2+  Right biceps: 2+  Left biceps: 2+  Right triceps: 2+  Left triceps: 2+  Right patellar: 2+  Left patellar: 2+  Right achilles: 2+  Left achilles: 2+  Right : 2+  Left : 2+  Right Cast: absent  Left Cast: absent  Right ankle clonus: absent  Left pendular knee jerk: absent

## 2022-11-18 ENCOUNTER — TELEPHONE (OUTPATIENT)
Dept: PAIN MEDICINE | Facility: CLINIC | Age: 42
End: 2022-11-18

## 2022-11-18 ENCOUNTER — CONSULT (OUTPATIENT)
Age: 42
End: 2022-11-18

## 2022-11-18 VITALS
SYSTOLIC BLOOD PRESSURE: 132 MMHG | BODY MASS INDEX: 41.75 KG/M2 | TEMPERATURE: 98.2 F | HEART RATE: 78 BPM | RESPIRATION RATE: 18 BRPM | HEIGHT: 73 IN | WEIGHT: 315 LBS | DIASTOLIC BLOOD PRESSURE: 75 MMHG

## 2022-11-18 DIAGNOSIS — M48.061 LUMBAR FORAMINAL STENOSIS: ICD-10-CM

## 2022-11-18 DIAGNOSIS — M48.061 SPINAL STENOSIS OF LUMBAR REGION WITHOUT NEUROGENIC CLAUDICATION: ICD-10-CM

## 2022-11-18 DIAGNOSIS — M54.16 LUMBAR BACK PAIN WITH RADICULOPATHY AFFECTING LEFT LOWER EXTREMITY: ICD-10-CM

## 2022-11-18 DIAGNOSIS — M51.16 LUMBAR DISC DISEASE WITH RADICULOPATHY: Primary | ICD-10-CM

## 2022-11-18 RX ORDER — GABAPENTIN 300 MG/1
300 CAPSULE ORAL
Qty: 30 CAPSULE | Refills: 1 | Status: SHIPPED | OUTPATIENT
Start: 2022-11-18

## 2022-11-18 NOTE — PROGRESS NOTES
Assessment:  1  Lumbar disc disease with radiculopathy    2  Lumbar back pain with radiculopathy affecting left lower extremity    3  Lumbar foraminal stenosis    4  Spinal stenosis of lumbar region without neurogenic claudication        Plan:  Mr Erendira Douglas is a pleasant 53-year-old male significant past medical history of JAMIE on BiPAP, ADHD, morbid obesity who presents to [address] [facility] for initial evaluation regarding 2 years duration of low back pain with radiating symptoms into the left lower extremity  He has been referred by neuro surgery for interventional considerations  During today's evaluation he is demonstrating clinical and diagnostic evidence of lumbar radiculopathy likely in relation to the multilevel degenerative disc disease and varying degrees of moderate to severe spinal and foraminal stenosis most notable at L3-L4, L4-L5 and L5-S1  At this time interventional approaches would be beneficial and warranted as well as medication management  As such we will   1  Plan for left-sided L4-L5 and L5-S1 TFESI under fluoro guidance  2  Will start the patient on gabapentin 300 mg at night and can titrate up as tolerated and necessary for neuropathic pain control   3  Complete risks and benefits including bleeding, infection, tissue reaction, nerve injury and allergic reaction were discussed  The approach was demonstrated using models and literature was provided  Verbal and written consent was obtained  History of Present Illness:    Shona Darnell is a 38 y o  male who presents to [facility] for initial evaluation of the above stated pain complaints  The patient has a past medical and chronic pain history as outlined in the assessment section   He was referred by Beverly Monsivais PA-C  [address] [place] [address]   Patient presents as referral from neuro surgery regarding 2 years duration of low back pain with radiating symptoms into left lower extremity  He is an   Denies any significant inciting event or recent trauma  Today reports moderate pain rated 3 to 7/10 and interfering with daily activities  Pain is nearly constant 60-95% of the time that is present in the afternoon and evening  Describes symptoms as burning, cramping, shooting, numbness, pins and needles  Also reports lower extremity weakness but denies falls  Does not use any durable medical equipment for ambulation  Symptoms are worse with standing, bending, sitting, walking, exercise  Has had no significant relief with home exercises and reports moderate relief with physical therapy  Denies smoking, marijuana use or alcohol abuse  Previously tried oral steroids which provided no significant improvements  Presents today for initial evaluation  Review of Systems:    Review of Systems   Constitutional: Negative for unexpected weight change  HENT: Negative for ear pain  Eyes: Negative for visual disturbance  Respiratory: Negative for shortness of breath and wheezing  Gastrointestinal: Negative for abdominal pain  Musculoskeletal: Positive for gait problem  Negative for back pain  Muscle pain left leg right leg cramps, numbness is l big toe bottom,    Neurological: Positive for weakness and numbness  Psychiatric/Behavioral: Positive for decreased concentration             Past Medical History:   Diagnosis Date   • ADHD (attention deficit hyperactivity disorder)    • CPAP (continuous positive airway pressure) dependence    • Hypersomnolence    • Hypertension    • Hypoxia    • Infrapatellar bursitis of right knee    • Mononucleosis    • Mycobacterial infectious disease    • JAMIE (obstructive sleep apnea)    • Pneumonia    • Right upper lobe pneumonia 10/7/2020   • Varicella        Past Surgical History:   Procedure Laterality Date   • BRONCHOSCOPY     • CARPAL TUNNEL RELEASE Bilateral    • CIRCUMCISION     • TONSILLECTOMY     • WISDOM TOOTH EXTRACTION      X1        Family History   Problem Relation Age of Onset   • Hypertension Mother    • Diabetes Mother         pre-diabetic    • Heart attack Father    • Diabetes Father    • Hypertension Father    • Obesity Father         hx LRYGB   • Heart disease Father         hx MI age 36   • Arthritis Family    • Cancer Family    • Cancer Paternal Grandmother         hx bladder cancer   • Heart attack Paternal Grandfather    • No Known Problems Son    • Stroke Neg Hx    • Thyroid disease Neg Hx        Social History     Occupational History   • Not on file   Tobacco Use   • Smoking status: Never   • Smokeless tobacco: Never   Vaping Use   • Vaping Use: Former   • Substances: Flavoring   Substance and Sexual Activity   • Alcohol use: Yes     Comment: rare   • Drug use: No   • Sexual activity: Yes     Partners: Female         Current Outpatient Medications:   •  escitalopram (LEXAPRO) 10 mg tablet, Take 1 tablet (10 mg total) by mouth daily, Disp: 90 tablet, Rfl: 3  •  fexofenadine (ALLEGRA) 180 MG tablet, Take 180 mg by mouth as needed  , Disp: , Rfl:   •  gabapentin (Neurontin) 300 mg capsule, Take 1 capsule (300 mg total) by mouth daily at bedtime, Disp: 30 capsule, Rfl: 1  •  lisdexamfetamine (Vyvanse) 40 MG capsule, Take 1 capsule (40 mg total) by mouth every morning Max Daily Amount: 40 mg, Disp: 30 capsule, Rfl: 0  •  losartan (COZAAR) 100 MG tablet, Take 1 tablet (100 mg total) by mouth daily, Disp: 90 tablet, Rfl: 3  •  VITAMIN D PO, Take by mouth daily, Disp: , Rfl:   •  methylPREDNISolone 4 MG tablet therapy pack, Use as directed on package (Patient not taking: Reported on 9/19/2022), Disp: 21 each, Rfl: 0    Allergies   Allergen Reactions   • Olmesartan-Amlodipine-Hctz Edema   • Terazosin Edema       Physical Exam:    /75   Pulse 78   Temp 98 2 °F (36 8 °C)   Resp 18   Ht 6' 1" (1 854 m)   Wt (!) 149 kg (329 lb)   BMI 43 41 kg/m²     Constitutional: normal, well developed, well nourished, alert, in no distress and non-toxic and no overt pain behavior  Eyes: anicteric  HEENT: grossly intact  Neck: supple, symmetric, trachea midline and no masses   Pulmonary:even and unlabored  Cardiovascular:No edema or pitting edema present  Skin:Normal without rashes or lesions and well hydrated  Psychiatric:Mood and affect appropriate  Neurologic:Cranial Nerves II-XII grossly intact  Musculoskeletal:antalgic, tenderness to palpation left-sided lumbar paraspinals, decreased active and passive range of motion lumbar flexion extension limited by pain, MMT 5/5 bilateral lower extremities, sensation decreased to light touch in patchy distribution posterolateral left shin, DTRs within normal limits, positive straight leg raise in the seated position radicular pain into the left leg    ImagingMRI LUMBAR SPINE WITHOUT CONTRAST     INDICATION: [id number]: Lumbago with sciatica, left side      COMPARISON:  8/22/2022     TECHNIQUE:  Sagittal T1, sagittal T2, sagittal inversion recovery, axial T1 and axial T2, coronal T2     IMAGE QUALITY:  Diagnostic     FINDINGS:     VERTEBRAL BODIES:  There are 5 lumbar type vertebral bodies  Type II Modic endplate changes inferior endplate of H40 with an adjacent small cyst noted  There are also type II Modic endplate changes anterior inferior aspect of the L5 inferior endplate  Scattered degenerative endplate changes  No focally suspicious marrow lesions  No bone marrow edema or compression abnormality  Normal lordosis      SACRUM:  Normal signal within the sacrum  No evidence of insufficiency or stress fracture      DISTAL CORD AND CONUS:  Normal size and signal within the distal cord and conus  The conus medullaris terminates at the L1 level      PARASPINAL SOFT TISSUES:  Rounded T2 hyperintensity in the medial aspect of the lower pole of the right kidney measures 3 1 cm    Smaller T2 hyperintensity, series 6, image 4 also in the right kidney measuring 1 3 cm likely a 2nd cyst      LOWER THORACIC DISC SPACES:  Mild noncompressive lower thoracic degenerative change      LUMBAR DISC SPACES:     L1-L2:       There is no focal disk herniation, central canal or neural foraminal narrowing  Bilateral facet hypertrophy noted      L2-L3:       There is a diffuse disk bulge  No significant central canal or neural foraminal narrowing  Bilateral facet hypertrophy noted      L3-L4:  There is a left neural foraminal disc protrusion  Severe left neural foraminal narrowing  Severe central canal narrowing  Severe right neural foraminal narrowing      L4-L5:  There is a left neural foraminal disc protrusion  There is bilateral facet hypertrophy  Moderate left neural foraminal narrowing  Mild central canal narrowing  Moderate right neural foraminal narrowing      L5-S1:  There is bilateral facet hypertrophy  There is a right neural foraminal disc protrusion  Moderate right neural foraminal narrowing  Mild central canal narrowing  Mild left neural foraminal narrowing      IMPRESSION:        1  Multilevel spondylosis most pronounced at L3-4  Spine surgical assessment suggested  2   Probable right renal cyst, incompletely characterized  Renal ultrasound recommended         Workstation performed: [id number]      No orders to display       No orders of the defined types were placed in this encounter

## 2022-11-18 NOTE — H&P (VIEW-ONLY)
Assessment:  1  Lumbar disc disease with radiculopathy    2  Lumbar back pain with radiculopathy affecting left lower extremity    3  Lumbar foraminal stenosis    4  Spinal stenosis of lumbar region without neurogenic claudication        Plan:  Mr Marge Arellano is a pleasant 80-year-old male significant past medical history of JAMIE on BiPAP, ADHD, morbid obesity who presents to [address] [facility] for initial evaluation regarding 2 years duration of low back pain with radiating symptoms into the left lower extremity  He has been referred by neuro surgery for interventional considerations  During today's evaluation he is demonstrating clinical and diagnostic evidence of lumbar radiculopathy likely in relation to the multilevel degenerative disc disease and varying degrees of moderate to severe spinal and foraminal stenosis most notable at L3-L4, L4-L5 and L5-S1  At this time interventional approaches would be beneficial and warranted as well as medication management  As such we will   1  Plan for left-sided L4-L5 and L5-S1 TFESI under fluoro guidance  2  Will start the patient on gabapentin 300 mg at night and can titrate up as tolerated and necessary for neuropathic pain control   3  Complete risks and benefits including bleeding, infection, tissue reaction, nerve injury and allergic reaction were discussed  The approach was demonstrated using models and literature was provided  Verbal and written consent was obtained  History of Present Illness:    Sterling Campbell is a 38 y o  male who presents to [facility] for initial evaluation of the above stated pain complaints  The patient has a past medical and chronic pain history as outlined in the assessment section   He was referred by Reza Carrillo PA-C  [address] [place] [address]   Patient presents as referral from neuro surgery regarding 2 years duration of low back pain with radiating symptoms into left lower extremity  He is an   Denies any significant inciting event or recent trauma  Today reports moderate pain rated 3 to 7/10 and interfering with daily activities  Pain is nearly constant 60-95% of the time that is present in the afternoon and evening  Describes symptoms as burning, cramping, shooting, numbness, pins and needles  Also reports lower extremity weakness but denies falls  Does not use any durable medical equipment for ambulation  Symptoms are worse with standing, bending, sitting, walking, exercise  Has had no significant relief with home exercises and reports moderate relief with physical therapy  Denies smoking, marijuana use or alcohol abuse  Previously tried oral steroids which provided no significant improvements  Presents today for initial evaluation  Review of Systems:    Review of Systems   Constitutional: Negative for unexpected weight change  HENT: Negative for ear pain  Eyes: Negative for visual disturbance  Respiratory: Negative for shortness of breath and wheezing  Gastrointestinal: Negative for abdominal pain  Musculoskeletal: Positive for gait problem  Negative for back pain  Muscle pain left leg right leg cramps, numbness is l big toe bottom,    Neurological: Positive for weakness and numbness  Psychiatric/Behavioral: Positive for decreased concentration             Past Medical History:   Diagnosis Date   • ADHD (attention deficit hyperactivity disorder)    • CPAP (continuous positive airway pressure) dependence    • Hypersomnolence    • Hypertension    • Hypoxia    • Infrapatellar bursitis of right knee    • Mononucleosis    • Mycobacterial infectious disease    • JAMIE (obstructive sleep apnea)    • Pneumonia    • Right upper lobe pneumonia 10/7/2020   • Varicella        Past Surgical History:   Procedure Laterality Date   • BRONCHOSCOPY     • CARPAL TUNNEL RELEASE Bilateral    • CIRCUMCISION     • TONSILLECTOMY     • WISDOM TOOTH EXTRACTION      X1        Family History   Problem Relation Age of Onset   • Hypertension Mother    • Diabetes Mother         pre-diabetic    • Heart attack Father    • Diabetes Father    • Hypertension Father    • Obesity Father         hx LRYGB   • Heart disease Father         hx MI age 36   • Arthritis Family    • Cancer Family    • Cancer Paternal Grandmother         hx bladder cancer   • Heart attack Paternal Grandfather    • No Known Problems Son    • Stroke Neg Hx    • Thyroid disease Neg Hx        Social History     Occupational History   • Not on file   Tobacco Use   • Smoking status: Never   • Smokeless tobacco: Never   Vaping Use   • Vaping Use: Former   • Substances: Flavoring   Substance and Sexual Activity   • Alcohol use: Yes     Comment: rare   • Drug use: No   • Sexual activity: Yes     Partners: Female         Current Outpatient Medications:   •  escitalopram (LEXAPRO) 10 mg tablet, Take 1 tablet (10 mg total) by mouth daily, Disp: 90 tablet, Rfl: 3  •  fexofenadine (ALLEGRA) 180 MG tablet, Take 180 mg by mouth as needed  , Disp: , Rfl:   •  gabapentin (Neurontin) 300 mg capsule, Take 1 capsule (300 mg total) by mouth daily at bedtime, Disp: 30 capsule, Rfl: 1  •  lisdexamfetamine (Vyvanse) 40 MG capsule, Take 1 capsule (40 mg total) by mouth every morning Max Daily Amount: 40 mg, Disp: 30 capsule, Rfl: 0  •  losartan (COZAAR) 100 MG tablet, Take 1 tablet (100 mg total) by mouth daily, Disp: 90 tablet, Rfl: 3  •  VITAMIN D PO, Take by mouth daily, Disp: , Rfl:   •  methylPREDNISolone 4 MG tablet therapy pack, Use as directed on package (Patient not taking: Reported on 9/19/2022), Disp: 21 each, Rfl: 0    Allergies   Allergen Reactions   • Olmesartan-Amlodipine-Hctz Edema   • Terazosin Edema       Physical Exam:    /75   Pulse 78   Temp 98 2 °F (36 8 °C)   Resp 18   Ht 6' 1" (1 854 m)   Wt (!) 149 kg (329 lb)   BMI 43 41 kg/m²     Constitutional: normal, well developed, well nourished, alert, in no distress and non-toxic and no overt pain behavior  Eyes: anicteric  HEENT: grossly intact  Neck: supple, symmetric, trachea midline and no masses   Pulmonary:even and unlabored  Cardiovascular:No edema or pitting edema present  Skin:Normal without rashes or lesions and well hydrated  Psychiatric:Mood and affect appropriate  Neurologic:Cranial Nerves II-XII grossly intact  Musculoskeletal:antalgic, tenderness to palpation left-sided lumbar paraspinals, decreased active and passive range of motion lumbar flexion extension limited by pain, MMT 5/5 bilateral lower extremities, sensation decreased to light touch in patchy distribution posterolateral left shin, DTRs within normal limits, positive straight leg raise in the seated position radicular pain into the left leg    ImagingMRI LUMBAR SPINE WITHOUT CONTRAST     INDICATION: M54 42: Lumbago with sciatica, left side      COMPARISON:  8/22/2022     TECHNIQUE:  Sagittal T1, sagittal T2, sagittal inversion recovery, axial T1 and axial T2, coronal T2     IMAGE QUALITY:  Diagnostic     FINDINGS:     VERTEBRAL BODIES:  There are 5 lumbar type vertebral bodies  Type II Modic endplate changes inferior endplate of G42 with an adjacent small cyst noted  There are also type II Modic endplate changes anterior inferior aspect of the L5 inferior endplate  Scattered degenerative endplate changes  No focally suspicious marrow lesions  No bone marrow edema or compression abnormality  Normal lordosis      SACRUM:  Normal signal within the sacrum  No evidence of insufficiency or stress fracture      DISTAL CORD AND CONUS:  Normal size and signal within the distal cord and conus  The conus medullaris terminates at the L1 level      PARASPINAL SOFT TISSUES:  Rounded T2 hyperintensity in the medial aspect of the lower pole of the right kidney measures 3 1 cm    Smaller T2 hyperintensity, series 6, image 4 also in the right kidney measuring 1 3 cm likely a 2nd cyst      LOWER THORACIC DISC SPACES:  Mild noncompressive lower thoracic degenerative change      LUMBAR DISC SPACES:     L1-L2:       There is no focal disk herniation, central canal or neural foraminal narrowing  Bilateral facet hypertrophy noted      L2-L3:       There is a diffuse disk bulge  No significant central canal or neural foraminal narrowing  Bilateral facet hypertrophy noted      L3-L4:  There is a left neural foraminal disc protrusion  Severe left neural foraminal narrowing  Severe central canal narrowing  Severe right neural foraminal narrowing      L4-L5:  There is a left neural foraminal disc protrusion  There is bilateral facet hypertrophy  Moderate left neural foraminal narrowing  Mild central canal narrowing  Moderate right neural foraminal narrowing      L5-S1:  There is bilateral facet hypertrophy  There is a right neural foraminal disc protrusion  Moderate right neural foraminal narrowing  Mild central canal narrowing  Mild left neural foraminal narrowing      IMPRESSION:        1  Multilevel spondylosis most pronounced at L3-4  Spine surgical assessment suggested  2   Probable right renal cyst, incompletely characterized  Renal ultrasound recommended         Workstation performed: [id number]      No orders to display       No orders of the defined types were placed in this encounter

## 2022-11-18 NOTE — TELEPHONE ENCOUNTER
Scheduled patient for 11/30/22  Patient denies RX blood thinners/ NSAIDS  Nothing to eat or drink 1 hour prior to procedure  Needs to arrange transportation  Proper clothing for procedure  No vaccines 2 weeks prior or after procedure  If ill or place on antibiotics, please call to reschedule

## 2022-11-28 ENCOUNTER — EVALUATION (OUTPATIENT)
Dept: PHYSICAL THERAPY | Facility: CLINIC | Age: 42
End: 2022-11-28

## 2022-11-28 DIAGNOSIS — M54.16 LUMBAR BACK PAIN WITH RADICULOPATHY AFFECTING LEFT LOWER EXTREMITY: Primary | ICD-10-CM

## 2022-11-28 NOTE — PROGRESS NOTES
PT Evaluation     Today's date: 2022  Patient name: Nadiya Garcia  : 1980  MRN: [id number]  Referring provider: Warren Santizo PA-C  Dx:   Encounter Diagnosis     ICD-10-CM    1  Lumbar back pain with radiculopathy affecting left lower extremity  M54 16 Ambulatory Referral to Physical Therapy                     Assessment  Assessment details:   CASE SUMMARY:   Nadiya Garcia is a 38y o  year old male who presents to OPPT upon referral from neurosurgery  secondary to c/o low back pain    Theo Beard reports recent onset of symptoms ~  9 months ago, but has a history of low back pain which he received OPPT for in the past   Most recent symptoms as a result of nothing specific  Patient describes  current symptoms as: burning sensation lateral left lower leg to ankle and and left 1st digit numbness, reports no low back pain unless inc physcial labor, can also experience pain left buttock which radiates down to mid thigh , can also experience a "zinger" on right LE form mid calf to ankle occurs 5 x week Symptoms are : intermittent  Pain level:  Current: 0/10  At Best: 0/10    with ADL's 5/10  Symptoms improve with: moving and stretching, being active, piriformis stretch, release of hip flexors and worsen with gabapentin, static positioning:standing or sitting  Overall symptom progression at this time is worsening  Previous injury to this area: yes, 2 years ago  Previous treatment includes: PT for previous episode, chiropractor  Diagnostic testing includes: Mri: L3/L4, L4/L5, L5/S1 herniated discs  PMHx includes: See chart for full details with medications, allergies, past family history, past medical history, social history, past surgical history and problem list  All topics were reviewed  Functionally, at baseline, Theo Beard is independent with all basic ADL's and  advanced ADL's    Currently, Theo Beard is limited in the following activities: has avoided heavy lifting, reduced tolerance to strenuous actvities  Wang Bustillo is lives with wife 2 kids in a 2 story home   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   Kaley Medrano is employed as a , inc lifting, bending  Patient goal(s) for physical therapy is: to not have surgery, to learn better body mechanics  The following is a summary of Joe objective status:    Impairments:   Postural dysfunction  Reduced ROM lumbar extension   Reduced strength left lower leg/first digit  Reduced flexibility: angie LE  Gait/elevation dysfunction  Reduced stability: TA produced left low back symptoms    Reduced activity tolerance including above stated functional limitations    Patient's clinical presentation is consistent with their referring diagnosis of: Lumbar back pain with radiculopathy affecting left lower extremity  Plan: Ambulatory Referral to Physical Therapy    Patient  Presents with no significant symptoms this session in regards to pain or left LE radiculopathy  Tightness was produced with repeated flexion in supine with overpressure  TA facilitation produced left low back symptoms unless cued to avoid over recruitment  Patient demonstrated weakness in first digit left foot as well and ankle DF on left  Patient will benefit from skilled PT to improve cores stability, LE flexibility and education on proper work ergonomics to safely perform his job  Patient was educated in deposit/withdraw mentality with repeated flexion and extension to prepare himself for increased repeated flexion  Patient vocalized understanding  Patient scheduled for injection in 1-2 days  PLOC was discussed and agreed upon with patient  Patient vocalized a good understanding of  PLOC and HEP issued   Wang Bustillo would benefit from skilled physical therapy services to address their aforementioned functional limitations and progress towards prior level of function, to meet stated goals,  and independence with home exercise program   Prognosis for successful rehab outcome is good with consistent participation in therapy and carryover of HEP and PLOC  FOTO score is 49 % with a 68% prediction in function  Functional limitations: see subjectiveUnderstanding of Dx/Px/POC: good   Prognosis: good    Goals  STG  + Patient will report a 35% improvement in symptoms (2-3 weeks)   + Patient will be independent in basic HEP (2-3 weeks)  + Patient will demonstrate an increase in range of motion  lumbar spine, extension    to  within normal limits (2-3 weeks)  + Patient will demonstrate proper lifting mechanics x 2 techniques to avoid future injury with verbal cuing (2-3 weeks)      LTG:  + Patient will report a 70% improvement in symptoms (4-6 weeks)   + Patient will increase FOTO score to expected score (8 sessions)   + Patient will be independent in comprehensive HEP (4-6 weeks)  + Patient will demonstrate increase  MMT of  elbow, ankle DF and hallux extension by 1 grade  (4-6 weeks)  + Patient will demonstrate proper lifting mechanics x 3 techniques to avoid future injury independently (4-6 weeks)  + Patient will increase SLS time by 10  seconds (2-3 weeks)    Plan  Plan details: 1-2 x week, 4-6 weeks: HEP development, stretching as needed per objective findings, strengthening core/lower quadrant, A/AA/PROM lumbar/hip motions, joint mobilizations prn, posture education, STM/MI as needed to reduce muscle tension per objective findings, muscle reeducation per objective findings, dynamic stabilization, PLOC discussed and agreed upon with patient    Patient would benefit from: skilled physical therapy  Frequency: 2x week  Plan of Care beginning date: 11/28/2022  Plan of Care expiration date: 1/9/2023  Treatment plan discussed with: patient        Subjective    Objective     Static Posture     Comments   history of spraining left ankle: 1 5 months ago    Concurrent Complaints:  Negative for bladder dysfunction, bowel dysfunction and saddle (S4) numbness    Posture   Sitting:posterior pelvic tilt   Standing: iliac crest =, ASIS =, PSIS = , reduced lumbar lordosis    Gait: reduced arm swing on right, no assistive device     Palpation: no TTP along spinous process       Functional movements   Squat: wnl no pain    SLS: left: 13 sec    Right : 3 sec     Unilateral heel raises: 10x each    Bridging: able ot lift 75% of motions (-) pain      Dermatomes  L2: (proximal lateral thigh) :    Right: normal     Left: normal  L3:(distal medial thigh)    Right: normal    Left: normal  L4(medial lower leg ) :    Right: normal   Left: normal  L5 (distal lateral lower leg):    Right: normal    Left normal  LS1 (achilles tendon):    Right: normal     Left normal  Reduced sensation medial aspect of left first digit compared to right  Myotomes   L2 (hip flexion): Right 5/5  Left: 4-/5   L3 (knee extension): Right: 5/5 Left: 5/5   L4 (ankle dorsiflexion): Right: 5/5 Left: 4-/5   L5 (hallux extension): Right: 4/5  Left: 4-/5   S1 (ankle plantarflexion): Right: 4/5 Left: 4-/5 requires UE support     Lumbar ROM    Flexion: wnl LOM (-) pain   Extension: wnl LOM (-) pain   Side bend: Right: wnl LOM + tightness     Left: wnl LOM + tightness     Rotation: right: wnl     Left: wnl    Repeated motions: BL: no symptoms  Standing flexion:    Effect: NE  Standing extension:   Effect: NE  Supine flexion:    Effect level:increased tightness in low back   Prone extension:    Effect level: prone lying resolved tightness in low back,  Press up x 1:  produced tightness across low back left greater then right, NW  Rep press up: noted, reduced extension right versus left with press up, overall NE:     TA facilitation: fair: patient reported production of symptoms with TA facilitation    Flexibility    Hamstring: Right: fair  Left: fair  painfree   Hip flexors:Right: poor Left: poor  painfree    Hip ROM/MMT: prn               Eval/ Re-eval Auth #/ Referral # Total visits Start date  Expiration date Total active units  Total manual units  PT only or  PT+OT?   11/28/22 60 visits pcy                           Precautions   Endocrine  Impaired fasting glucose     Respiratory  Pneumonia  Asthma  JAMIE (obstructive sleep apnea)  Influenza  Right upper lobe pneumonia     Cardiovascular and Mediastinum  Hypertension     Musculoskeletal and Integument  Infrapatellar bursitis of right knee     Other  ADHD (attention deficit hyperactivity disorder)  Morbid obesity with BMI of 40 0-44 9, adult (HCC)  Excessive daytime sleepiness  Hypersomnolence  Chronic fatigue  Positive blood culture  Periodic limb movement  COVID-19    Specialty Daily Treatment Diary       Insurance :         Visits: 1       Manual: 11/28/22               L/S p-a mobs        Man Flexibility: hamstring, hip flexors, piriformis                                Ther ex:        Protocol:         Kurt test stretch edge of table instruct       supine hamstring stretch instruct       Anterior hip stretch (piriformis) Reviewed                Prone press up        Prone press up with exhale: prn                Sit/stand with hip hinge        Ankle DF with band emphasis in eccentric lowering                        Neuro Miller:         TA activation (stab cuff)        + marching        + BKFO        + alternating LE extension                Bridging + glut set                                Therapeutic Activity:         Reevaluation                Gait Training:                 HEP:                 Modalities        MH        ice        Total time:          Access Code: [email or web address]  URL: [email or web address]  Date: 11/28/2022  Prepared by:  Elisa Gould    Exercises  • Supine Hamstring Stretch with Strap - 1 x daily - 7 x weekly - 1 sets - 5 reps - 15 sec hold  • Supine Quadriceps Stretch with Strap on Table - 1 x daily - 7 x weekly - 1 sets - 5 reps - 15 sec hold

## 2022-11-30 ENCOUNTER — APPOINTMENT (OUTPATIENT)
Dept: RADIOLOGY | Facility: HOSPITAL | Age: 42
End: 2022-11-30

## 2022-11-30 ENCOUNTER — HOSPITAL ENCOUNTER (OUTPATIENT)
Facility: AMBULARY SURGERY CENTER | Age: 42
Setting detail: OUTPATIENT SURGERY
Discharge: HOME | End: 2022-11-30
Attending: PHYSICAL MEDICINE & REHABILITATION | Admitting: PHYSICAL MEDICINE & REHABILITATION

## 2022-11-30 VITALS
OXYGEN SATURATION: 99 % | DIASTOLIC BLOOD PRESSURE: 96 MMHG | SYSTOLIC BLOOD PRESSURE: 139 MMHG | RESPIRATION RATE: 18 BRPM | HEART RATE: 70 BPM | TEMPERATURE: 97 F

## 2022-11-30 RX ORDER — DEXAMETHASONE SODIUM PHOSPHATE 10 MG/ML
INJECTION, SOLUTION INTRAMUSCULAR; INTRAVENOUS AS NEEDED
Status: DISCONTINUED | OUTPATIENT
Start: 2022-11-30 | End: 2022-11-30 | Stop reason: HOSPADM

## 2022-11-30 RX ORDER — LIDOCAINE HYDROCHLORIDE 10 MG/ML
INJECTION, SOLUTION EPIDURAL; INFILTRATION; INTRACAUDAL; PERINEURAL AS NEEDED
Status: DISCONTINUED | OUTPATIENT
Start: 2022-11-30 | End: 2022-11-30 | Stop reason: HOSPADM

## 2022-11-30 NOTE — DISCHARGE INSTRUCTIONS
Epidural Steroid Injection   WHAT YOU NEED TO KNOW:   An epidural steroid injection (MK) is a procedure to inject steroid medicine into the epidural space  The epidural space is between your spinal cord and vertebrae  Steroids reduce inflammation and fluid buildup in your spine that may be causing pain  You may be given pain medicine along with the steroids  ACTIVITY  Do not drive or operate machinery today  No strenuous activity today - bending, lifting, etc   You may resume normal activites starting tomorrow - start slowly and as tolerated  You may shower today, but no tub baths or hot tubs  You may have numbness for several hours from the local anesthetic  Please use caution and common sense, especially with weight-bearing activities  CARE OF THE INJECTION SITE  If you have soreness or pain, apply ice to the area today (20 minutes on/20 minutes off)  Starting tomorrow, you may use warm, moist heat or ice if needed  You may have an increase or change in your discomfort for 36-48 hours after your treatment  Apply ice and continue with any pain medication you have been prescribed  Notify the [facility] if you have any of the following: redness, drainage, swelling, headache, stiff neck or fever above 100°F     SPECIAL INSTRUCTIONS  Our office will contact you in approximately 7 days for a progress report  MEDICATIONS  Continue to take all routine medications  Our office may have instructed you to hold some medications  As no general anesthesia was used in today's procedure, you should not experience any side effects related to anesthesia  If you are diabetic, the steroids used in today's injection may temporarily increase your blood sugar levels after the first few days after your injection  Please keep a close eye on your sugars and alert the doctor who manages your diabetes if your sugars are significantly high from your baseline or you are symptomatic       If you have a problem specifically related to your procedure, please call our office at [phone]  Problems not related to your procedure should be directed to your primary care physician

## 2022-11-30 NOTE — OP NOTE
OPERATIVE REPORT  PATIENT NAME: Ozzie Vargas    :  1980  MRN: [id number]  Pt Location: [facility] MINOR/PAIN ROOM 01    SURGERY DATE: 2022    Surgeon(s) and Role:     * DO Venkatesh Peck Primary    Preop Diagnosis:  Spinal stenosis of lumbar region without neurogenic claudication [M48 061]    Post-Op Diagnosis Codes:     * Spinal stenosis of lumbar region without neurogenic claudication [M48 061]    Procedure(s) (LRB):  L4-L5, L5-S1  TRANSFORAMINAL epidural steroid injection ([id number]) (Left)    Specimen(s):  * No specimens in log *      EBL:  none  Specimens:  not applicable    After discussing the risks, benefits, and alternatives to the procedure, the patient expressed understanding and wished to proceed  The patient was brought to the fluoroscopy suite and placed in the prone position  A procedural pause was conducted to verify:  correct patient identity, procedure to be performed and as applicable, correct side and site, correct patient position, and availability of implants, special equipment and special requirements  After identifying the left L4 pedicle fluoroscopically with an oblique view, the skin was sterilely prepped and draped in the usual fashion using Chloraprep skin prep  The skin and subcutaneous tissues were anesthetized with 1% lidocaine  A 5 in 22 gauge spinal needle was then advanced under fluoroscopic guidance to the neural foramen  Appropriate foraminal depth was determined with a lateral fluoroscopic view, and AP visualization confirmed needle positioning at approximately the 6 o'clock position relative to the pedicle  After negative aspiration, Omnipaque 240 contrast was injected using live fluoroscopy confirming appropriate transforaminal spread without evidence of intravascular or intrathecal uptake  Next, a 1 5 ml solution consisting of 5 mg of dexamethasone with 0 25% bupivacaine was injected slowly and incrementally into the epidural space    Following the injection the needle was withdrawn  The procedure was then repeated in the exact same way at the left L5 pedicle with a 5 in 22 gauge spinal needle  The patient tolerated the procedure well and there were no apparent complications  The patient did not develop any new neurologic deficits  After appropriate observation, the patient was dismissed from the clinic in good condition under their own power              SIGNATURE: Katelin Mendoza,   DATE: November 30, 2022  TIME: 3:13 PM

## 2022-11-30 NOTE — INTERVAL H&P NOTE
H&P reviewed  After examining the patient I find no changes in the patients condition since the H&P had been written      Vitals:    11/30/22 1455   BP: 126/83   Pulse: 89   Resp: 18   Temp: (!) 97 °F (36 1 °C)   SpO2: 98%

## 2022-12-01 ENCOUNTER — OFFICE VISIT (OUTPATIENT)
Dept: PHYSICAL THERAPY | Facility: CLINIC | Age: 42
End: 2022-12-01

## 2022-12-01 DIAGNOSIS — M54.16 LUMBAR BACK PAIN WITH RADICULOPATHY AFFECTING LEFT LOWER EXTREMITY: Primary | ICD-10-CM

## 2022-12-01 NOTE — PROGRESS NOTES
Daily Note     Today's date: 2022  Patient name: Clementine Knox  : 1980  MRN: [id number]  Referring provider: Robert Tirado PA-C  Dx:   Encounter Diagnosis     ICD-10-CM    1  Lumbar back pain with radiculopathy affecting left lower extremity  M54 16                      Subjective: Clementine Knox reports following his epidural he has been experiences a significant decrease in left LE symptoms  He continues with intermittent symptoms with sitting slouched or bending, lifting  He feels stretches from last session have helped  Objective: See treatment diary below      Assessment: Tolerated treatment well  Patient reported slight increase in LE symptoms with transfer from sitting to supine that improved following body mechanics training  He also had another increase while sitting in poor posture that reduced with repeated EIS  He demod good understanding of HEP progressed today and postural training for sitting ergonomics  Plan: Continue per plan of care  Eval/ Re-eval Auth #/ Referral # Total visits Start date  Expiration date Total active units  Total manual units  PT only or  PT+OT?   22 60 visits pcy                           Precautions   Endocrine  Impaired fasting glucose     Respiratory  Pneumonia  Asthma  JAMIE (obstructive sleep apnea)  Influenza  Right upper lobe pneumonia     Cardiovascular and Mediastinum  Hypertension     Musculoskeletal and Integument  Infrapatellar bursitis of right knee     Other  ADHD (attention deficit hyperactivity disorder)  Morbid obesity with BMI of 40 0-44 9, adult (HCC)  Excessive daytime sleepiness  Hypersomnolence  Chronic fatigue  Positive blood culture  Periodic limb movement  COVID-19    Specialty Daily Treatment Diary     Access Code: [email or web address]  URL: [email or web address]  Date: 2022  Prepared by:  Jeimy [address] :      Visits: 2 1   Manual: 22                                 Ther ex: Protocol:      Kurt test stretch edge of table  instruct   supine hamstring stretch Seated HS 3x30s instruct   Anterior hip stretch (piriformis)  Reviewed    Hook LS rot 2x10    Postural ed Sitting ergonomics + towel roll    MONIKA 2x10 reduced LE symptoms    Sitting to supine transfer training                        Neuro Miller:      Bridge w/ PPT 2x10 5s    SL clamshell 2x10 B cues for core stab    STS  Training + 2x10                                    •

## 2022-12-05 ENCOUNTER — LAB (OUTPATIENT)
Dept: LAB | Facility: HOSPITAL | Age: 42
End: 2022-12-05

## 2022-12-05 ENCOUNTER — OFFICE VISIT (OUTPATIENT)
Dept: PHYSICAL THERAPY | Facility: CLINIC | Age: 42
End: 2022-12-05

## 2022-12-05 ENCOUNTER — HOSPITAL ENCOUNTER (OUTPATIENT)
Dept: RADIOLOGY | Facility: HOSPITAL | Age: 42
Discharge: HOME | End: 2022-12-05

## 2022-12-05 DIAGNOSIS — N28.1 RENAL CYST, RIGHT: ICD-10-CM

## 2022-12-05 DIAGNOSIS — R73.01 IMPAIRED FASTING GLUCOSE: ICD-10-CM

## 2022-12-05 DIAGNOSIS — Z13.6 SCREENING FOR CARDIOVASCULAR CONDITION: ICD-10-CM

## 2022-12-05 DIAGNOSIS — M54.16 LUMBAR BACK PAIN WITH RADICULOPATHY AFFECTING LEFT LOWER EXTREMITY: Primary | ICD-10-CM

## 2022-12-05 LAB
25(OH)D3 SERPL-MCNC: 26.3 NG/ML (ref 30–100)
ALBUMIN SERPL BCP-MCNC: 3.5 G/DL (ref 3.5–5)
ALP SERPL-CCNC: 88 U/L (ref 46–116)
ALT SERPL W P-5'-P-CCNC: 51 U/L (ref 12–78)
ANION GAP SERPL CALCULATED.3IONS-SCNC: 3 MMOL/L (ref 4–13)
AST SERPL W P-5'-P-CCNC: 31 U/L (ref 5–45)
BILIRUB SERPL-MCNC: 0.49 MG/DL (ref 0.2–1)
BUN SERPL-MCNC: 16 MG/DL (ref 5–25)
CALCIUM SERPL-MCNC: 9 MG/DL (ref 8.3–10.1)
CHLORIDE SERPL-SCNC: 103 MMOL/L (ref 96–108)
CHOLEST SERPL-MCNC: 138 MG/DL
CO2 SERPL-SCNC: 29 MMOL/L (ref 21–32)
CREAT SERPL-MCNC: 0.78 MG/DL (ref 0.6–1.3)
GFR SERPL CREATININE-BSD FRML MDRD: 112 ML/MIN/1.73SQ M
GLUCOSE P FAST SERPL-MCNC: 97 MG/DL (ref 65–99)
HDLC SERPL-MCNC: 31 MG/DL
LDLC SERPL CALC-MCNC: 77 MG/DL (ref 0–100)
POTASSIUM SERPL-SCNC: 3.8 MMOL/L (ref 3.5–5.3)
PROT SERPL-MCNC: 7.3 G/DL (ref 6.4–8.4)
SODIUM SERPL-SCNC: 135 MMOL/L (ref 135–147)
TRIGL SERPL-MCNC: 150 MG/DL

## 2022-12-05 NOTE — PROGRESS NOTES
Daily Note     Today's date: 2022  Patient name: Tianna Dixon  : 1980  MRN: [id number]  Referring provider: Adrien Martinez PA-C  Dx:   Encounter Diagnosis     ICD-10-CM    1  Lumbar back pain with radiculopathy affecting left lower extremity  M54 16           Start Time: 72  Stop Time: 1830  Total time in clinic (min): 45 minutes    Subjective: Pt reports he was compliant with HEP, improvement with symptoms at work      Objective: See treatment diary below      Assessment: Tolerated treatment well  Patient instructed on REIL today and performed with no inc symptoms  Standing repeated extensions not performed due to symptoms in shin  Demo inc tightness in B HF      Plan: Continue per plan of care  Eval/ Re-eval Auth #/ Referral # Total visits Start date  Expiration date Total active units  Total manual units  PT only or  PT+OT?   22 60 visits pcy                           Precautions   Endocrine  Impaired fasting glucose     Respiratory  Pneumonia  Asthma  JAMIE (obstructive sleep apnea)  Influenza  Right upper lobe pneumonia     Cardiovascular and Mediastinum  Hypertension     Musculoskeletal and Integument  Infrapatellar bursitis of right knee     Other  ADHD (attention deficit hyperactivity disorder)  Morbid obesity with BMI of 40 0-44 9, adult (HCC)  Excessive daytime sleepiness  Hypersomnolence  Chronic fatigue  Positive blood culture  Periodic limb movement  COVID-19    Specialty Daily Treatment Diary     Access Code: [email or web address]  URL: [email or web address]/  Date: 2022  Prepared by:  Jeimy [address] :       Visits: 3 2 1   Manual: 22                                       Ther ex:      Protocol:       TA isos Reg, march, BKFO 20x ea     Kurt test stretch edge of table Manual 3x30 ea  instruct   supine hamstring stretch Seated  Seated HS 3x30s instruct   Anterior hip stretch (piriformis)   Reviewed    Hook LS rot 2x10  2x10    Postural ed  Sitting ergonomics + towel roll    MONIKA REIL 2x10 reduced symptoms 2x10 reduced LE symptoms    Sitting to supine transfer  training                            Neuro Miller:       Bridge w/ PPT 2x10 2x10 5s    SL clamshell  2x10 B cues for core stab    STS   Training + 2x10                                          •

## 2022-12-07 ENCOUNTER — TELEPHONE (OUTPATIENT)
Dept: PAIN MEDICINE | Facility: CLINIC | Age: 42
End: 2022-12-07

## 2022-12-08 ENCOUNTER — OFFICE VISIT (OUTPATIENT)
Dept: PHYSICAL THERAPY | Facility: CLINIC | Age: 42
End: 2022-12-08

## 2022-12-08 DIAGNOSIS — M54.16 LUMBAR BACK PAIN WITH RADICULOPATHY AFFECTING LEFT LOWER EXTREMITY: Primary | ICD-10-CM

## 2022-12-08 NOTE — PROGRESS NOTES
Daily Note     Today's date: 2022  Patient name: Momo Perez  : 1980  MRN: [id number]  Referring provider: Chapito Kingston PA-C  Dx:   Encounter Diagnosis     ICD-10-CM    1  Lumbar back pain with radiculopathy affecting left lower extremity  M54 16           Start Time: 61  Stop Time: 1830  Total time in clinic (min): 45 minutes    Subjective: Pt reports "feeling great"  No symptoms prior to session      Objective: See treatment diary below      Assessment: Tolerated treatment well  Instructed pt to perform kneeling HF at work, performed with good tolerance  Plan: Continue per plan of care  Eval/ Re-eval Auth #/ Referral # Total visits Start date  Expiration date Total active units  Total manual units  PT only or  PT+OT?   22 60 visits pcy                           Precautions   Endocrine  Impaired fasting glucose     Respiratory  Pneumonia  Asthma  JAMIE (obstructive sleep apnea)  Influenza  Right upper lobe pneumonia     Cardiovascular and Mediastinum  Hypertension     Musculoskeletal and Integument  Infrapatellar bursitis of right knee     Other  ADHD (attention deficit hyperactivity disorder)  Morbid obesity with BMI of 40 0-44 9, adult (HCC)  Excessive daytime sleepiness  Hypersomnolence  Chronic fatigue  Positive blood culture  Periodic limb movement  COVID-19    Specialty Daily Treatment Diary     Access Code: [email or web address]  URL: [email or web address]  Date: 2022  Prepared by:  Jeimy [address] :        Visits: 4 3 2 1   Manual: 22                                             Ther ex:       Protocol:        TA isos Reg, march, BKFO 20x ea Reg, march, BKFO 20x ea     Kurt test stretch edge of table Kneeling HF str 3x30s Manual 3x30 ea  instruct   supine hamstring stretch Seated 3x30s Seated  Seated HS 3x30s instruct   Anterior hip stretch (piriformis)    Reviewed    Hook LS rot 2x10 2x10  2x10    Postural ed   Sitting ergonomics + towel roll    MONIKA REIL 2x10  10x therapist OP REIL 2x10 reduced symptoms 2x10 reduced LE symptoms    Sitting to supine transfer   training    ZURDO 3'                           Neuro Miller:        Bridge w/ PPT 2x10 2x10 2x10 5s    SL clamshell   2x10 B cues for core stab    STS    Training + 2x10    TA w/ Shld ext Blue tband 2x10      Paloff press Blue tband 10x ea

## 2022-12-09 NOTE — TELEPHONE ENCOUNTER
Pt reports 50% improvement post inj   Pain level 1/10  Pt aware I will call next week for an update

## 2022-12-12 ENCOUNTER — APPOINTMENT (OUTPATIENT)
Dept: PHYSICAL THERAPY | Facility: CLINIC | Age: 42
End: 2022-12-12

## 2022-12-14 DIAGNOSIS — M54.16 LUMBAR RADICULOPATHY: Primary | ICD-10-CM

## 2022-12-14 RX ORDER — DULOXETIN HYDROCHLORIDE 30 MG/1
30 CAPSULE, DELAYED RELEASE ORAL DAILY
Qty: 30 CAPSULE | Refills: 1 | Status: SHIPPED | OUTPATIENT
Start: 2022-12-14 | End: 2023-03-20 | Stop reason: ALTCHOICE

## 2022-12-14 NOTE — TELEPHONE ENCOUNTER
Caller: patient   Doctor/office: Percy Brand CB#: [phone]    % of improvement: 50  Pain Scale (1-10): 4/10 (burning on bilateral leg & left toe bottom numbness

## 2022-12-14 NOTE — TELEPHONE ENCOUNTER
S/w pt, states he got better last week with his pain and this week he felt the pain is coming back. He had a flare up yesterday, his left leg's burning and left toe numb. He had L4-S1 TFESI 11/30. Pt stopped taking gabapentin 11/29, pt claimed his numbness on his foot had gotten worst from gabapentin. His pain level 4/10. Pt wants to know what recommendations KW has for him.

## 2022-12-14 NOTE — TELEPHONE ENCOUNTER
Pt says he only tried gabapentin. It's up to KW to decide lyrica or cymbalta.  Pls send to [facility] pharmacy on record.

## 2022-12-15 ENCOUNTER — OFFICE VISIT (OUTPATIENT)
Dept: PHYSICAL THERAPY | Facility: CLINIC | Age: 42
End: 2022-12-15

## 2022-12-15 DIAGNOSIS — M54.16 LUMBAR BACK PAIN WITH RADICULOPATHY AFFECTING LEFT LOWER EXTREMITY: Primary | ICD-10-CM

## 2022-12-15 NOTE — PROGRESS NOTES
Daily Note     Today's date: 12/15/2022  Patient name: Gerda Katz  : 1980  MRN: [id number]  Referring provider: Yumiko Fernando PA-C  Dx:   Encounter Diagnosis     ICD-10-CM    1  Lumbar back pain with radiculopathy affecting left lower extremity  M54 16           Start Time: 3971  Stop Time: 1830  Total time in clinic (min): 45 minutes    Subjective: Pt reports that he was jumping off a tractor a few times which really set off his symptoms  Pt states he was getting better prior to this  Pt is feeling better today, tries to stay consistent with his HEP, notices the press-ups are the best, standing extensions can irritate his symptoms at times  Objective: See treatment diary below      Assessment: Tolerated treatment well  Patient demonstrated fatigue post treatment, exhibited good technique with therapeutic exercises and would benefit from continued PT  Pt given alternatives for standing lumbar extensions and hip flexor stretches  Pt tolerated press ups well with centralization of symptoms  Plan: Continue per plan of care  Progress treatment as tolerated  Eval/ Re-eval Auth #/ Referral # Total visits Start date  Expiration date Total active units  Total manual units  PT only or  PT+OT?   22 60 visits pcy                           Precautions   Endocrine  Impaired fasting glucose     Respiratory  Pneumonia  Asthma  JAMIE (obstructive sleep apnea)  Influenza  Right upper lobe pneumonia     Cardiovascular and Mediastinum  Hypertension     Musculoskeletal and Integument  Infrapatellar bursitis of right knee     Other  ADHD (attention deficit hyperactivity disorder)  Morbid obesity with BMI of 40 0-44 9, adult (HCC)  Excessive daytime sleepiness  Hypersomnolence  Chronic fatigue  Positive blood culture  Periodic limb movement  COVID-19    Specialty Daily Treatment Diary     Access Code: [email or web address]  URL: [email or web address]  Date: 2022  Prepared by:  Gabo Rocha [address] :         Visits: 5 4 3 2 1   Manual: 12/15 12/8 12/5 12/1/22 11/28/22                                                   Ther ex:        Protocol:         TA isos  Reg, march, BKFO 20x ea Reg, march, BKFO 20x ea     Kurt test stretch edge of table  Kneeling HF str 3x30s Manual 3x30 ea  instruct   supine hamstring stretch Seated 3x30s Seated 3x30s Seated  Seated HS 3x30s instruct   Anterior hip stretch (piriformis)     Reviewed    Hook LS rot 2x10 2x10 2x10  2x10    Postural ed    Sitting ergonomics + towel roll    MONIKA REIL 10x  10x w/PT OP REIL 2x10  10x therapist OP REIL 2x10 reduced symptoms 2x10 reduced LE symptoms    Sitting to supine transfer    training    ZURDO 3' 3'      Standing hip ext 2x10 B                       Neuro Miller:         Bridge w/ PPT 2x10 2x10 2x10 2x10 5s    SL clamshell    2x10 B cues for core stab    STS     Training + 2x10    TA w/ Shld ext spenser 5 0 2x10 Blue tband 2x10      Paloff press spenser 3 0 5x ea Blue tband 10x ea

## 2022-12-19 ENCOUNTER — OFFICE VISIT (OUTPATIENT)
Dept: PHYSICAL THERAPY | Facility: CLINIC | Age: 42
End: 2022-12-19

## 2022-12-19 DIAGNOSIS — M54.16 LUMBAR BACK PAIN WITH RADICULOPATHY AFFECTING LEFT LOWER EXTREMITY: Primary | ICD-10-CM

## 2022-12-19 NOTE — PROGRESS NOTES
Daily Note     Today's date: 2022  Patient name: Idalia Ravi  : 1980  MRN: [id number]  Referring provider: Oscar Agudelo PA-C  Dx:   Encounter Diagnosis     ICD-10-CM    1  Lumbar back pain with radiculopathy affecting left lower extremity  M54 16           Start Time: 4205  Stop Time: [address]  Total time in clinic (min): 55 minutes    Subjective: Pt reports since Saturday he has been feeling fantastic  Pt reports he started new medication on Friday, which he is not sure if that is what has improved his symptoms  Objective: See treatment diary below      Assessment: Pt reports radicular symptoms with standing, reduced with ZURDO  Pt cued to glut set with bridges, decreased HS cramping  Plan: Continue per plan of care  Progress treatment as tolerated  Eval/ Re-eval Auth #/ Referral # Total visits Start date  Expiration date Total active units  Total manual units  PT only or  PT+OT?   22 60 visits pcy                           Precautions   Endocrine  Impaired fasting glucose     Respiratory  Pneumonia  Asthma  JAMIE (obstructive sleep apnea)  Influenza  Right upper lobe pneumonia     Cardiovascular and Mediastinum  Hypertension     Musculoskeletal and Integument  Infrapatellar bursitis of right knee     Other  ADHD (attention deficit hyperactivity disorder)  Morbid obesity with BMI of 40 0-44 9, adult (HCC)  Excessive daytime sleepiness  Hypersomnolence  Chronic fatigue  Positive blood culture  Periodic limb movement  COVID-19    Specialty Daily Treatment Diary     Access Code: [email or web address]  URL: [email or web address]  Date: 2022  Prepared by:  Jeimy [address] :          Visits: 6 5 4 3 2 1   Manual: 12/19 12/15 12/8 12/5 12/1/22 11/28/22                                                         Ther ex:         Protocol:          TA isos   Reg, march, BKFO 20x ea Reg, march, BKFO 20x ea     Kurt test stretch edge of table 1 min   Kneeling HF str 3x30s Manual 3x30 ea  instruct   supine hamstring stretch Supine SOS 1 min Seated 3x30s Seated 3x30s Seated  Seated HS 3x30s instruct   Anterior hip stretch (piriformis)      Reviewed    Hook LS rot 2x10 2x10 2x10 2x10  2x10    Postural ed     Sitting ergonomics + towel roll    MONIKA  REIL 10x  10x w/PT OP REIL 2x10  10x therapist OP REIL 2x10 reduced symptoms 2x10 reduced LE symptoms    Sitting to supine transfer     training    ZURDO 2 min 3' 3'      Standing hip ext 10x B changed prone 10x B  2x10 B                         Neuro Miller:          Bridge w/ PPT 2x10 2x10 2x10 2x10 2x10 5s    SL clamshell     2x10 B cues for core stab    STS      Training + 2x10    TA w/ Shld ext spenser 5 0 2x10 spenser 5 0 2x10 Blue tband 2x10      Paloff press spenser 3 0 5x ea spenser 3 0 5x ea Blue tband 10x ea      Glut Sets 10x5"

## 2022-12-22 ENCOUNTER — OFFICE VISIT (OUTPATIENT)
Dept: PHYSICAL THERAPY | Facility: CLINIC | Age: 42
End: 2022-12-22

## 2022-12-22 DIAGNOSIS — M54.16 LUMBAR BACK PAIN WITH RADICULOPATHY AFFECTING LEFT LOWER EXTREMITY: Primary | ICD-10-CM

## 2022-12-22 NOTE — PROGRESS NOTES
Daily Note     Today's date: 2022  Patient name: Nika Phan  : 1980  MRN: [id number]  Referring provider: Jovanna Gonzales PA-C  Dx:   Encounter Diagnosis     ICD-10-CM    1  Lumbar back pain with radiculopathy affecting left lower extremity  M54 16           Start Time:   Stop Time:   Total time in clinic (min): 45 minutes    Subjective: Pt reports inc pain after last a day later  He reports he started to feel his pain while he was at work the next day  Minimal pain reported today 2/10    Objective: See treatment diary below      Assessment: Pt demo good tolerance today, added LAD and reported relief with this  Plan: Continue per plan of care  Progress treatment as tolerated  Eval/ Re-eval Auth #/ Referral # Total visits Start date  Expiration date Total active units  Total manual units  PT only or  PT+OT?   22 60 visits pcy                           Precautions   Endocrine  Impaired fasting glucose     Respiratory  Pneumonia  Asthma  JAMIE (obstructive sleep apnea)  Influenza  Right upper lobe pneumonia     Cardiovascular and Mediastinum  Hypertension     Musculoskeletal and Integument  Infrapatellar bursitis of right knee     Other  ADHD (attention deficit hyperactivity disorder)  Morbid obesity with BMI of 40 0-44 9, adult (HCC)  Excessive daytime sleepiness  Hypersomnolence  Chronic fatigue  Positive blood culture  Periodic limb movement  COVID-19    Specialty Daily Treatment Diary     Access Code: [email or web address]  URL: [email or web address]  Date: 2022  Prepared by:  Jeimy [address] :          Visits: 7 6 5 4 3 2   Manual: 12/22 12/19 12/15 12/8 12/5 12/1/22   STM Lumbar musculature        LAD  RLE 3x30s                                            Ther ex:         Protocol:          TA isos    Reg, march, BKFO 20x ea Reg, march, BKFO 20x ea    Kurt test stretch edge of table  1 min   Kneeling HF str 3x30s Manual 3x30 ea    supine hamstring stretch SOS 3x30s Supine SOS 1 min Seated 3x30s Seated 3x30s Seated  Seated HS 3x30s   Anterior hip stretch (piriformis)         Hook LS rot 20x 2x10 2x10 2x10 2x10  2x10   Postural ed      Sitting ergonomics + towel roll   MONIKA 2x10 w/ exhale  REIL 10x  10x w/PT OP REIL 2x10  10x therapist OP REIL 2x10 reduced symptoms 2x10 reduced LE symptoms   Sitting to supine transfer      training   ZURDO 3' pre 2 min 3' 3'     Standing hip ext  10x B changed prone 10x B  2x10 B      B Hip ER str                  Neuro Miller:          Bridge w/ PPT 2x10 2x10 2x10 2x10 2x10 2x10 5s   SL clamshell      2x10 B cues for core stab   STS       Training + 2x10   TA w/ Shld ext Nathan 10 0 2x10 nathan 5 0 2x10 nathan 5 0 2x10 Blue tband 2x10     Paloff press  nathan 3 0 5x ea nathan 3 0 5x ea Blue tband 10x ea     Glut Sets  10x5"

## 2022-12-28 ENCOUNTER — EVALUATION (OUTPATIENT)
Dept: PHYSICAL THERAPY | Facility: CLINIC | Age: 42
End: 2022-12-28

## 2022-12-28 DIAGNOSIS — M54.16 LUMBAR BACK PAIN WITH RADICULOPATHY AFFECTING LEFT LOWER EXTREMITY: Primary | ICD-10-CM

## 2022-12-28 NOTE — PROGRESS NOTES
PT Re-Evaluation     Today's date: 2022  Patient name: Mercedes Hu  : 1980  MRN: [id number]  Referring provider: Susanne Alfonso PA-C  Dx:   Encounter Diagnosis     ICD-10-CM    1  Lumbar back pain with radiculopathy affecting left lower extremity  M54 16                      Assessment  Assessment details: Mercedes Hu has been seen for 8 visits of physical therapy for lumbar radiculopathy  Mercedes Hu is demonstrating excellent progress with reports of decreasing frequency and intensity of radicular symptoms in LEs  He demos improved myotomal strength in bilateral LEs vs at IE, improving spinal ROM, postural awareness and ability to self reduce symptoms through repeated extension in lying movements  During today's session he continued with symptoms easily peripheralized and increased with extension movements when in WB position, including extension in standing, simulating holding his baby with slight extension in lumbar spine  His LE symptoms provoked with these extension movements immediately reduce with cues to achieve more neutral spine position and engage LE musculature vs "locking" legs out  He demonstrated low endurance of LEs with significant fatigue in quads and LEs following walking on treadmill on 4 degrees incline for 3 minutes, simulating his walk up his 1/4 mile driveway  Patient will continue to benefit from skilled physical therapy to continually address the remaining strength, ROM, proprioceptive deficits to be able to return to iAD[employer]s, work, recreational activities at [employer]     Impairments: abnormal gait, abnormal or restricted ROM, activity intolerance, impaired balance, impaired physical strength, pain with function, poor posture  and poor body mechanics  Understanding of Dx/Px/POC: good   Prognosis: good    Goals  STG  + Patient will report a 35% improvement in symptoms (2-3 weeks) - Met  + Patient will be independent in basic HEP (2-3 weeks) - Met  + Patient will demonstrate an increase in range of motion  lumbar spine, extension    to  within normal limits (2-3 weeks) - In Progress   + Patient will demonstrate proper lifting mechanics x 2 techniques to avoid future injury with verbal cuing (2-3 weeks) - In Progress       LTG: - In Progress   + Patient will report a 70% improvement in symptoms (4-6 weeks)   + Patient will increase FOTO score to expected score (8 sessions)   + Patient will be independent in comprehensive HEP (4-6 weeks)  + Patient will demonstrate increase  MMT of  elbow, ankle DF and hallux extension by 1 grade  (4-6 weeks)  + Patient will demonstrate proper lifting mechanics x 3 techniques to avoid future injury independently (4-6 weeks)  + Patient will increase SLS time by 10  seconds (2-3 weeks)    Plan  Plan details: 1-2 x week, 4-6 weeks: HEP development, stretching as needed per objective findings, strengthening core/lower quadrant, A/AA/PROM lumbar/hip motions, joint mobilizations prn, posture education, STM/MI as needed to reduce muscle tension per objective findings, muscle reeducation per objective findings, dynamic stabilization, PLOC discussed and agreed upon with patient  Patient would benefit from: skilled physical therapy  Frequency: 2x week  Plan of Care beginning date: 12/28/2022  Plan of Care expiration date: 2/8/2023  Treatment plan discussed with: patient        Subjective Evaluation    History of Present Illness  Mechanism of injury: Toy Kearney reports last week he had an acute exacerbation of increased bilateral L>R radicular LE symptoms  He states he spoke to his pain management physician who prescribed cymbalta  Increased symptoms lasted for about 2 days and has since subsided to prior levels        Pain Location: left shin pain, left toe numbness intermittent, left buttock and low back ache  Worst: 6/10  Best: 0/10  Current: 1/10     Aggravating Factors: Lifting heavy objects such as a tire at work, prolonged walking, holding his young kids (15-36lbs) for even short periods of time  Reducing Factors: Pressups and back bends        Objective     Static Posture     Comments  Concurrent Complaints:  Negative for bladder dysfunction, bowel dysfunction and saddle (S4) numbness    Posture  Fair sitting posture  Patient standing posture with slight extension of lumbar spine and knees hyperextended    Gait: reduced arm swing on right, no assistive device     Palpation: no TTP along spinous process  Functional movements   Squat: wnl no pain    SLS: left: 13 sec    Right : 10 sec     Unilateral heel raises: 12 L vs 14 R          Dermatomes  L2: (proximal lateral thigh) :    Right: normal     Left: normal  L3:(distal medial thigh)    Right: normal    Left: normal  L4(medial lower leg ) :    Right: normal   Left: normal  L5 (distal lateral lower leg):    Right: normal    Left normal  LS1 (achilles tendon):    Right: normal     Left normal  Reduced sensation medial aspect of left first digit compared to right  Myotomes   L2 (hip flexion): Right 5/5  Left: 5/5   L3 (knee extension): Right: 5/5 Left: 5/5   L4 (ankle dorsiflexion): Right: 5/5 Left: 4+/5   L5 (hallux extension): Right: 5/5  Left: 4/5   S1 (ankle plantarflexion): Right: 5/5 Left: 4+/5      Lumbar ROM    Flexion: wnl LOM (-) pain   Extension: min  LOM (+) pain in left shin   Side bend: Right: wnl LOM + tightness     Left: wnl LOM + tightness     Rotation: right: wnl     Left: wnl    Repeated motions: BL: no symptoms  Standing flexion:    Effect: NE  Standing extension:   Effect: 10x - peripheralized from shin to left toe  Press up x 1:  produced tightness in bilateral hamstring, NW  Rep press up: reduced and centralized left LE symptoms        Flexibility    Hamstring: Right: fair  Left: fair  painfree   Hip flexors:Right: poor Left: poor  painfree    Hip ROM/MMT: prn                 Precautions: Mary Anneard  Access Code: [email or web address]  URL: [email or web address]  Date: 11/28/2022  Prepared by:  Alvaro Vences    Re-eval 12/28  Insurance :         Visits: 8 7 6 5 4   Manual: 12/28 12/22 12/19 12/15 12/8   STM  Lumbar musculature      LAD   RLE 3x30s                                      Ther ex:        Protocol:         TA isos     Reg, march, BKFO 20x ea   Kurt test stretch edge of table   1 min   Kneeling HF str 3x30s   supine hamstring stretch  SOS 3x30s Supine SOS 1 min Seated 3x30s Seated 3x30s   B Heel raises 2x10 off step       Hook LS rot  20x 2x10 2x10 2x10   Postural ed        MONIKA 10x which P symptoms then DC 2x10 w/ exhale  REIL 10x  10x w/PT OP REIL 2x10  10x therapist OP   REIL 2x10 decreased and centralizing symp  10x (3x) throughout session       Sitting to supine transfer        ZURDO  3' pre 2 min 3' 3'   Standing hip ext   10x B changed prone 10x B  2x10 B    B Hip ER str        TM  Ambulation 4 degrees incline 3 5 min cues for fwd wt shift       Neuro Miller:         Bridge w/ PPT  2x10 2x10 2x10 2x10   SL clamshell        STS         TA w/ Shld ext  [employer] 10 0 2x10 spenser 5 0 2x10 spenser 5 0 2x10 Blue tband 2x10   Paloff press   spenser 3 0 5x ea spenser 3 0 5x ea Blue tband 10x ea   Glut Sets   10x5"      Squat lift  15# bumper plate simulate lifting tire onto car on [employer] + 5x

## 2022-12-29 ENCOUNTER — OFFICE VISIT (OUTPATIENT)
Dept: PHYSICAL THERAPY | Facility: CLINIC | Age: 42
End: 2022-12-29

## 2022-12-29 DIAGNOSIS — M54.16 LUMBAR BACK PAIN WITH RADICULOPATHY AFFECTING LEFT LOWER EXTREMITY: Primary | ICD-10-CM

## 2022-12-29 NOTE — PROGRESS NOTES
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 with no radicular symptoms      Objective: See treatment diary below      Assessment: Tolerated treatment well  Patient demonstrated fatigue post treatment, exhibited good technique with therapeutic exercises and would benefit from continued PT  Pt did not experience any pain or radicular symptoms today  Plan: Continue per plan of care  Precautions: Chelo  Access Code: [email or web address]  URL: [email or web address]  Date: 2022  Prepared by:  Isa Power   Insurance :         Visits: [phone]   Manual: 12/29 12/28 12/22 12/19 12/15   STM   Lumbar musculature     LAD    RLE 3x30s                                     Ther ex:        Protocol:         TA isos        Krut test stretch edge of table 3x30s   1 min     supine hamstring stretch SOS 3x30s  SOS 3x30s Supine SOS 1 min Seated 3x30s   B Heel raises  2x10 off step      Hook LS rot 20x  20x 2x10 2x10   Postural ed        MONIKA  10x which P symptoms then DC 2x10 w/ exhale  REIL 10x  10x w/PT OP   REIL 2x10 exhale 2x10 decreased and centralizing symp  10x (3x) throughout session      Sitting to supine transfer        ZURDO 3'   3' pre 2 min 3'   Standing hip ext    10x B changed prone 10x B  2x10 B   B Hip ER str        TM   Ambulation 4 degrees incline 3 5 min cues for fwd wt shift      Neuro Miller:         Bridge w/ PPT   2x10 2x10 2x10   SL clamshell        STS         TA w/ Shld ext 13 2 2x10  Spenser 10 0 2x10 spenser 5 0 2x10 spenser 5 0 2x10   Paloff press 6 0 20x B   spenser 3 0 5x ea spenser 3 0 5x ea   Glut Sets 10x5"     Squat lift   15# bumper plate simulate lifting tire onto car on felipe  Training + 5x

## 2023-01-03 ENCOUNTER — OFFICE VISIT (OUTPATIENT)
Dept: PAIN MEDICINE | Facility: CLINIC | Age: 43
End: 2023-01-03

## 2023-01-03 VITALS
HEART RATE: 70 BPM | WEIGHT: 315 LBS | DIASTOLIC BLOOD PRESSURE: 80 MMHG | SYSTOLIC BLOOD PRESSURE: 130 MMHG | BODY MASS INDEX: 43.41 KG/M2

## 2023-01-03 DIAGNOSIS — M48.061 SPINAL STENOSIS OF LUMBAR REGION WITHOUT NEUROGENIC CLAUDICATION: ICD-10-CM

## 2023-01-03 DIAGNOSIS — M54.16 LUMBAR BACK PAIN WITH RADICULOPATHY AFFECTING LEFT LOWER EXTREMITY: ICD-10-CM

## 2023-01-03 DIAGNOSIS — M48.061 LUMBAR FORAMINAL STENOSIS: ICD-10-CM

## 2023-01-03 DIAGNOSIS — M54.16 LUMBAR RADICULOPATHY: Primary | ICD-10-CM

## 2023-01-05 ENCOUNTER — OFFICE VISIT (OUTPATIENT)
Dept: PHYSICAL THERAPY | Facility: CLINIC | Age: 43
End: 2023-01-05

## 2023-01-05 DIAGNOSIS — M54.16 LUMBAR BACK PAIN WITH RADICULOPATHY AFFECTING LEFT LOWER EXTREMITY: Primary | ICD-10-CM

## 2023-01-05 DIAGNOSIS — M54.16 LUMBAR RADICULOPATHY: Primary | ICD-10-CM

## 2023-01-05 RX ORDER — DICLOFENAC SODIUM 75 MG/1
75 TABLET, DELAYED RELEASE ORAL 2 TIMES DAILY PRN
Qty: 60 TABLET | Refills: 2 | Status: SHIPPED | OUTPATIENT
Start: 2023-01-05

## 2023-01-05 NOTE — PROGRESS NOTES
Daily Note     Today's date: 2023  Patient name: Gaye Michael  : 1980  MRN: [id number]  Referring provider: Delores Figueroa PA-C  Dx:   Encounter Diagnosis   Name Primary? • Lumbar back pain with radiculopathy affecting left lower extremity Yes                  Subjective: Pt reports minimal to no tingling or pain in left leg  Has been mindful of his lifting mechanics at work  Objective: See treatment diary below      Assessment: Progressed pt's program per treatment diary and demonstrates good tolerance  Pt continues with weakness and poor endurance when performing core strengthening,  Radicular symptoms present when fatigued  Plan: Continue with plan of care to decrease pain, improve mobility, strength, and function  Precautions: Standard  Access Code: [email or web address]  URL: [email or web address]  Date: 2022  Prepared by:  Emily Power   Insurance :         Visits: [phone]   Manual:    STM    Lumbar musculature    LAD     RLE 3x30s                                    Ther ex:        Protocol:         TA isos        Kurt test stretch edge of table 3x30s 3x30s   1 min    supine hamstring stretch SOS 3x30s SOS 3x30s  SOS 3x30s Supine SOS 1 min   B Heel raises   2x10 off step     Hook LS rot 20x 20x  20x 2x10   Postural ed        MONIKA   10x which P symptoms then DC 2x10 w/ exhale    REIL  2x10 exhale 2x10 decreased and centralizing symp  10x (3x) throughout session     Sitting to supine transfer        ZURDO 3 min 3'   3' pre 2 min   Standing hip ext     10x B changed prone 10x B    B Hip ER str        TM    Ambulation 4 degrees incline 3 5 min cues for fwd wt shift     Neuro Miller:         Bridge w/ PPT    2x10 2x10   SL clamshell        STS         TA w/ Shld ext With march green 2x10 13 2 2x10  Spenser 10 0 2x10 spenser 5 0 2x10   Paloff press With side step 3 0 x3 ea 6 0 20x B   spenser 3 0 5x ea   Glut Sets     10x5" Squat lift    15# bumper plate simulate lifting tire onto car on felipe  Training + 5x     Supine march with alt hold 2x10

## 2023-01-06 NOTE — PROGRESS NOTES
Pain Medicine Follow-Up Note    Assessment:  1  Lumbar radiculopathy    2  Lumbar foraminal stenosis    3  Lumbar back pain with radiculopathy affecting left lower extremity    4  Spinal stenosis of lumbar region without neurogenic claudication        Plan:      My impressions and treatment recommendations were discussed in detail with the patient who verbalized understanding and had no further questions  Patient follows up in the office in regards to a left L4-L5, L5-S1 transforaminal epidural steroid injection that the patient received on 11/30/2022  Patient reports significant pain relief stating that he has received 50% of pain relief which is ongoing  At this time the patient is looking for options to control his pain  During the visit the patient also verbalized he is looking to try to reduce his pain so that he can exercise to lose weight which he knows will also help reduce his overall pain  After consulting Dr Juancho Farmer, I recommend the patient discontinue Cymbalta due to the patient experiencing side effects, and since the patient having a significant reduction of his pain symptoms currently we recommend that he use an as needed NSAID therefore at this time I recommend the patient use diclofenac 75 mg tablet twice daily as needed for pain  Follow-up is planned as needed time or sooner as warranted  Discharge instructions were provided  I personally saw and examined the patient and I agree with the above discussed plan of care  History of Present Illness:    Artis Peña is a 38 y o  male who presents to [facility] for interval re-evaluation of the above stated pain complaints  The patient has a past medical and chronic pain history as outlined in the assessment section  He was last seen on 11/30/2022  At today's visit patient states that he feels better and currently has a pain score of 1 out of 10 on the verbal numeric pain scale    On 11/30/2022 patient underwent a transforaminal epidural steroid injection which is providing him approximately 50% relief of his pain symptoms which the patient states is ongoing  Patient states that his pain is worse in the morning  The patient's pain is intermittent in nature  And the quality of the patient's pain is described as burning, numbness, and pins-and-needles  The patient indicates that the pain is located in his left anterior lower leg  Other than as stated above, the patient denies any interval changes in medications, medical condition, mental condition, symptoms, or allergies since the last office visit  Review of Systems:    Review of Systems   Respiratory: Negative for shortness of breath  Cardiovascular: Negative for chest pain  Gastrointestinal: Negative for constipation, diarrhea, nausea and vomiting  Musculoskeletal: Negative for arthralgias, gait problem, joint swelling and myalgias  Pain in the left lower leg   Skin: Negative for rash  Neurological: Negative for dizziness, seizures and weakness  All other systems reviewed and are negative  Past Medical History:   Diagnosis Date   • ADHD (attention deficit hyperactivity disorder)    • CPAP (continuous positive airway pressure) dependence    • Hypersomnolence    • Hypertension    • Hypoxia    • Infrapatellar bursitis of right knee    • Mononucleosis    • Mycobacterial infectious disease    • JAMIE (obstructive sleep apnea)    • Pneumonia    • Right upper lobe pneumonia 10/7/2020   • Varicella        Past Surgical History:   Procedure Laterality Date   • BRONCHOSCOPY     • CARPAL TUNNEL RELEASE Bilateral    • CIRCUMCISION     • EPIDURAL BLOCK INJECTION Left 11/30/2022    Procedure: L4-L5, L5-S1  TRANSFORAMINAL epidural steroid injection ([id number]);   Surgeon: Maddy Alvarado DO;  Location: [facility] MAIN OR;  Service: Pain Management    • TONSILLECTOMY     • WISDOM TOOTH EXTRACTION      X1        Family History   Problem Relation Age of Onset   • Hypertension Mother    • Diabetes Mother         pre-diabetic    • Heart attack Father    • Diabetes Father    • Hypertension Father    • Obesity Father         hx LRYGB   • Heart disease Father         hx MI age 36   • Arthritis Family    • Cancer Family    • Cancer Paternal Grandmother         hx bladder cancer   • Heart attack Paternal Grandfather    • No Known Problems Son    • Stroke Neg Hx    • Thyroid disease Neg Hx        Social History     Occupational History   • Not on file   Tobacco Use   • Smoking status: Never   • Smokeless tobacco: Never   Vaping Use   • Vaping Use: Former   • Substances: Flavoring   Substance and Sexual Activity   • Alcohol use: Yes     Comment: rare   • Drug use: No   • Sexual activity: Yes     Partners: Female         Current Outpatient Medications:   •  diclofenac (VOLTAREN) 75 mg EC tablet, Take 1 tablet (75 mg total) by mouth 2 (two) times a day as needed (pain), Disp: 60 tablet, Rfl: 2  •  DULoxetine (CYMBALTA) 30 mg delayed release capsule, Take 1 capsule (30 mg total) by mouth daily, Disp: 30 capsule, Rfl: 1  •  escitalopram (LEXAPRO) 10 mg tablet, Take 1 tablet (10 mg total) by mouth daily, Disp: 90 tablet, Rfl: 3  •  fexofenadine (ALLEGRA) 180 MG tablet, Take 180 mg by mouth as needed  , Disp: , Rfl:   •  lisdexamfetamine (Vyvanse) 40 MG capsule, Take 1 capsule (40 mg total) by mouth every morning Max Daily Amount: 40 mg, Disp: 30 capsule, Rfl: 0  •  losartan (COZAAR) 100 MG tablet, Take 1 tablet (100 mg total) by mouth daily, Disp: 90 tablet, Rfl: 3  •  VITAMIN D PO, Take by mouth daily, Disp: , Rfl:     Allergies   Allergen Reactions   • Olmesartan-Amlodipine-Hctz Edema   • Terazosin Edema       Physical Exam:    /80   Pulse 70   Wt (!) 149 kg (329 lb)   BMI 43 41 kg/m²     Constitutional:normal, well developed, well nourished, alert, in no distress and non-toxic and no overt pain behavior   and obese  Eyes:anicteric  HEENT:grossly intact  Neck:supple, symmetric, trachea midline and no masses   Pulmonary:even and unlabored  Cardiovascular:No edema or pitting edema present  Skin:Normal without rashes or lesions and well hydrated  Psychiatric:Mood and affect appropriate  Neurologic:Cranial Nerves II-XII grossly intact  Musculoskeletal:normal

## 2023-01-09 ENCOUNTER — OFFICE VISIT (OUTPATIENT)
Dept: PHYSICAL THERAPY | Facility: CLINIC | Age: 43
End: 2023-01-09

## 2023-01-09 DIAGNOSIS — M54.16 LUMBAR BACK PAIN WITH RADICULOPATHY AFFECTING LEFT LOWER EXTREMITY: Primary | ICD-10-CM

## 2023-01-09 NOTE — ASSESSMENT & PLAN NOTE
Seen for follow up of low back pain with LLE pain  · Main complaint is left anterior shin intermittent burning pain and left great toe numbness with weakness in left foot  When the pain flares, he will get some LBP across the low back  No BBI or saddle anesthesia  · Recent left L4, L5 TFESI  PT made the shin pain worse, but helped ROM  · Exam: no midline spinal TTP, 5/5 throughout except 4/5 left great toe DF, LT and pinprick diminished in left anterior shin and left great toe, 1+ left DTRs, no clonus  Imaging:  · MRI lumbar spine 10/31/22: 1  Multilevel spondylosis most pronounced at L3-4  Spine surgical assessment suggested  2   Probable right renal cyst, incompletely characterized  Renal ultrasound recommended  · Lumbar XR 8/22/22: Multilevel degenerative changes of lumbar spine  Plan:  · Reviewed images with patient, wife and Dr Dulce Aquino  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

## 2023-01-09 NOTE — PROGRESS NOTES
Daily Note     Today's date: 2023  Patient name: Mavis Moy  : 1980  MRN: [id number]  Referring provider: John Waldron PA-C  Dx:   Encounter Diagnosis     ICD-10-CM    1  Lumbar back pain with radiculopathy affecting left lower extremity  M54 16                      Subjective: Mavis Moy reports he was feeling better prior to last session but left with increased numbness in left foot that lingered for several days following  Objective: See treatment diary below      Assessment: Tolerated treatment well  Patient continued to respond well to repeated extension movements of lumbar spine  He also continue with poor core stability leading to increase of left LE symptoms with initiation of low level abdominal exercises  He responded well to modifying intensity of exercises and focusing on maintaining neutral spine positioning  Plan: Continue per plan of care  Precautions: Standard  Access Code: [email or web address]  URL: [email or web address]  Date: 2022  Prepared by:  Esther Power   Insurance :         Visits: [phone]   Manual:    STM     Lumbar musculature   LAD      RLE 3x30s                                   Ther ex:        Protocol:         TA isos        Kurt test stretch edge of table Modified to tall kneel 3x30 without increase LE symp 3x30s 3x30s     supine hamstring stretch  SOS 3x30s SOS 3x30s  SOS 3x30s   B Heel raises    2x10 off step    Hook LS rot 20x 20x 20x  20x   Postural ed        MONIKA    10x which P symptoms then DC 2x10 w/ exhale   REIL 2x10  2x10 exhale 2x10 decreased and centralizing symp  10x (3x) throughout session    Sitting to supine transfer        ZURDO  3 min 3'   3' pre   Prone hip ext 2x10 SL  2x10 B       B Hip ER str        TM     Ambulation 4 degrees incline 3 5 min cues for fwd wt shift    Neuro Miller:         Bridge w/ PPT     2x10   SL clamshell        STS         TA w/ Shld ext  With march green 2x10 13 2 2x10  Nathan 10 0 2x10   Paloff press  With side step 3 0 x3 ea 6 0 20x B     Glut Sets        Squat lift     15# bumper plate simulate lifting tire onto car on [address]  Training + 5x    Supine march with alt hold abd stab SKTC alternating, unable to scissor  2x10 2x10      Hook LS rot w/ft up 2x10 decreased motion

## 2023-01-10 ENCOUNTER — OFFICE VISIT (OUTPATIENT)
Dept: NEUROSURGERY | Facility: CLINIC | Age: 43
End: 2023-01-10

## 2023-01-10 VITALS
DIASTOLIC BLOOD PRESSURE: 84 MMHG | TEMPERATURE: 97.6 F | BODY MASS INDEX: 41.75 KG/M2 | SYSTOLIC BLOOD PRESSURE: 138 MMHG | OXYGEN SATURATION: 97 % | HEIGHT: 73 IN | RESPIRATION RATE: 19 BRPM | WEIGHT: 315 LBS | HEART RATE: 71 BPM

## 2023-01-10 DIAGNOSIS — M54.16 LUMBAR RADICULOPATHY: Primary | ICD-10-CM

## 2023-01-10 NOTE — PROGRESS NOTES
Neurosurgery Office Note  Carlota Sheriff 38 y o  male MRN: [id number]      Assessment/Plan     Lumbar radiculopathy  Seen for follow up of low back pain with LLE pain  · Main complaint is left anterior shin intermittent burning pain and left great toe numbness with weakness in left foot  When the pain flares, he will get some LBP across the low back  No BBI or saddle anesthesia  · Recent left L4, L5 TFESI  PT made the shin pain worse, but helped ROM  · Exam: no midline spinal TTP, 5/5 throughout except 4/5 left great toe DF, LT and pinprick diminished in left anterior shin and left great toe, 1+ left DTRs, no clonus  Imaging:  · MRI lumbar spine 10/31/22: 1  Multilevel spondylosis most pronounced at L3-4  Spine surgical assessment suggested  2   Probable right renal cyst, incompletely characterized  Renal ultrasound recommended  · Lumbar XR 8/22/22: Multilevel degenerative changes of lumbar spine  Plan:  · Reviewed images with patient, wife and Dr Becki Grubbs  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  There are no diagnoses linked to this encounter  I spent 50 minutes with the patient today in which >50% of the time was spent counseling/coordination of care regarding diagnosis, imaging review, symptoms and treatment plan       CHIEF COMPLAINT    Chief Complaint   Patient presents with   • Follow-up       HISTORY    History of Present Illness     38y o  year old male     38year old gentleman seen for follow up of left shin burning pain and LBP  States that it started about 2 years ago or so  He went to PT initially which helped and he improved  The left shin pain returned around 12/2021  The pain is intermittent and comes on with long walks and car rides as well as if he is standing all day  At worst the pain is 6-7/10, but is increasing in frequency  The left great toe will be numb intermittently and varies in intensity  When the pain flares up, he feels a weakness in the left foot  If the pain gets bad in the shin, he will note a separate pain in his back across the low back  This is also intermittent and worse with activities  At worst is 5/10  Randomly he will get shooting pain in the right shin  No BBI or saddle anesthesia  Works as a   PT seems to make the shooting pain worse/more frequent, but has helped with his ROM  Recently underwent Left L4, L5 TFESI, unsure if it helped him  See Discussion    REVIEW OF SYSTEMS    Review of Systems   HENT: Negative  Eyes: Negative  Respiratory: Negative  Cardiovascular: Negative  Genitourinary: Negative  Negative for frequency  Musculoskeletal: Positive for back pain (left Leg Pain wraps around left leg/ side of Buttock to front of shin) and gait problem (with burning shin pain and tightness lower back)  Consult-no previous sx  back pain w/ L Leg pain/side buttock & L toes/foot numbness- L leg weakness & difficult walk x 2 years-getting worse last 6mon    PT x 2 yrs ago (some relief at that time, pain has increased since)     MK/PM none    Neurological: Positive for weakness (weak small improvement since starting physical therapy) and numbness (left leg down shin to  big toe)  Hematological:        Neg AC    All other systems reviewed and are negative          Meds/Allergies     Current Outpatient Medications   Medication Sig Dispense Refill   • escitalopram (LEXAPRO) 10 mg tablet Take 1 tablet (10 mg total) by mouth daily 90 tablet 3   • fexofenadine (ALLEGRA) 180 MG tablet Take 180 mg by mouth as needed       • lisdexamfetamine (Vyvanse) 40 MG capsule Take 1 capsule (40 mg total) by mouth every morning Max Daily Amount: 40 mg 30 capsule 0   • losartan (COZAAR) 100 MG tablet Take 1 tablet (100 mg total) by mouth daily 90 tablet 3   • VITAMIN D PO Take by mouth daily     • diclofenac (VOLTAREN) 75 mg EC tablet Take 1 tablet (75 mg total) by mouth 2 (two) times a day as needed (pain) (Patient not taking: Reported on 1/10/2023) 60 tablet 2   • DULoxetine (CYMBALTA) 30 mg delayed release capsule Take 1 capsule (30 mg total) by mouth daily (Patient not taking: Reported on 1/10/2023) 30 capsule 1     No current facility-administered medications for this visit  Allergies   Allergen Reactions   • Olmesartan-Amlodipine-Hctz Edema   • Terazosin Edema       PAST HISTORY    Past Medical History:   Diagnosis Date   • ADHD (attention deficit hyperactivity disorder)    • CPAP (continuous positive airway pressure) dependence    • Hypersomnolence    • Hypertension    • Hypoxia    • Infrapatellar bursitis of right knee    • Mononucleosis    • Mycobacterial infectious disease    • JAMIE (obstructive sleep apnea)    • Pneumonia    • Right upper lobe pneumonia 10/7/2020   • Varicella        Past Surgical History:   Procedure Laterality Date   • BRONCHOSCOPY     • CARPAL TUNNEL RELEASE Bilateral    • CIRCUMCISION     • EPIDURAL BLOCK INJECTION Left 11/30/2022    Procedure: L4-L5, L5-S1  TRANSFORAMINAL epidural steroid injection ([id number]); Surgeon: Ashlee Martin DO;  Location: [facility] MAIN OR;  Service: Pain Management    • TONSILLECTOMY     • WISDOM TOOTH EXTRACTION      X1        Social History     Tobacco Use   • Smoking status: Never   • Smokeless tobacco: Never   Vaping Use   • Vaping Use: Former   • Substances: Flavoring   Substance Use Topics   • Alcohol use:  Yes Comment: rare   • Drug use: No       Family History   Problem Relation Age of Onset   • Hypertension Mother    • Diabetes Mother         pre-diabetic    • Heart attack Father    • Diabetes Father    • Hypertension Father    • Obesity Father         hx LRYGB   • Heart disease Father         hx MI age 36   • Arthritis Family    • Cancer Family    • Cancer Paternal Grandmother         hx bladder cancer   • Heart attack Paternal Grandfather    • No Known Problems Son    • Stroke Neg Hx    • Thyroid disease Neg Hx          Above history personally reviewed  EXAM    Vitals:Blood pressure 138/84, pulse 71, temperature 97 6 °F (36 4 °C), resp  rate 19, height 6' 1" (1 854 m), weight (!) 147 kg (325 lb), SpO2 97 %  ,Body mass index is 42 88 kg/m²  Physical Exam  Vitals reviewed  Constitutional:       General: He is awake  Appearance: Normal appearance  HENT:      Head: Normocephalic and atraumatic  Eyes:      Conjunctiva/sclera: Conjunctivae normal    Cardiovascular:      Rate and Rhythm: Normal rate  Pulmonary:      Effort: Pulmonary effort is normal    Musculoskeletal:         General: No tenderness  Comments: No midline spinal TTP  Normal ROM   Skin:     General: Skin is warm and dry  Neurological:      Mental Status: He is alert and oriented to person, place, and time  Coordination: Heel to Shin Test normal       Gait: Gait is intact  Deep Tendon Reflexes:      Reflex Scores:       Patellar reflexes are 2+ on the right side and 1+ on the left side  Achilles reflexes are 1+ on the right side and 1+ on the left side  Psychiatric:         Attention and Perception: Attention and perception normal          Mood and Affect: Mood and affect normal          Speech: Speech normal          Behavior: Behavior normal  Behavior is cooperative  Thought Content:  Thought content normal          Cognition and Memory: Cognition and memory normal          Judgment: Judgment normal  Neurologic Exam     Mental Status   Oriented to person, place, and time  Follows 3 step commands  Attention: normal  Concentration: normal    Speech: speech is normal   Level of consciousness: alert  Knowledge: good  Normal comprehension  Motor Exam   Muscle bulk: normal  Overall muscle tone: normal  RLE - 5/5 throughout  LLE - 5/5 throughout, 4/5 left great toe dorsiflexion     Sensory Exam   Right leg light touch: normal  Left leg light touch: diminished in anterior shin and dorsal-medial foot to great toe  Right leg pinprick: normal  Left leg pinprick: diminished dorsal-medial foot to great toe, hyperintense to anterior shin  Gait, Coordination, and Reflexes     Gait  Gait: normal    Coordination   Heel to shin coordination: normal    Tremor   Resting tremor: absent  Intention tremor: absent  Action tremor: absent    Reflexes   Right patellar: 2+  Left patellar: 1+  Right achilles: 1+  Left achilles: 1+  Right ankle clonus: absent  Left ankle clonus: absent        MEDICAL DECISION MAKING    Imaging Studies:     No results found  I have personally reviewed pertinent reports     and I have personally reviewed pertinent films in PACS

## 2023-01-12 ENCOUNTER — OFFICE VISIT (OUTPATIENT)
Dept: PHYSICAL THERAPY | Facility: CLINIC | Age: 43
End: 2023-01-12

## 2023-01-12 DIAGNOSIS — M54.16 LUMBAR BACK PAIN WITH RADICULOPATHY AFFECTING LEFT LOWER EXTREMITY: Primary | ICD-10-CM

## 2023-01-12 NOTE — PROGRESS NOTES
Daily Note     Today's date: 2023  Patient name: Samra Borjas  : 1980  MRN: [id number]  Referring provider: Nevaeh Farias PA-C  Dx:   Encounter Diagnosis     ICD-10-CM    1  Lumbar back pain with radiculopathy affecting left lower extremity  M54 16                      Subjective: Patient reports he has been feeling better over the last few days, he said he will not need surgery and will continue to strengthen on his own  Objective: See treatment diary below      Assessment: Tolerated treatment well  Discussed emphasis on gradual build up of TA with exercise and high repetition while reducing radicular symptoms  Stressed proper form and quality of repetitions over quantity and he was able to tolerate greater challenge with supine TA exercises without increase in radicular symptoms  Patient demonstrated fatigue post treatment, exhibited good technique with therapeutic exercises and would benefit from continued PT      Plan: Continue per plan of care  Progress treatment as tolerated  Progress challenge with core stabilization as tolerated with radicular symptoms  Precautions: Standard  Access Code: [email or web address]  URL: [email or web address]  Date: 2022  Prepared by:  Shu Power   Insurance :         Visits:  10 9 8   Manual:    STM        LAD                                         Ther ex:        Protocol:         TA isos        Kurt test stretch edge of table  Modified to tall kneel 3x30 without increase LE symp 3x30s 3x30s    supine hamstring stretch   SOS 3x30s SOS 3x30s    B Heel raises     2x10 off step   Hook LS rot  20x 20x 20x    Postural ed        MONIKA     10x which P symptoms then DC   REIL 4x10 2x10  2x10 exhale 2x10 decreased and centralizing symp  10x (3x) throughout session   Sitting to supine transfer        ZURDO   3 min 3'     Prone hip ext 2x10 SL, 2x10 2x10 SL  2x10 B      B Hip ER str        TM Ambulation 4 degrees incline 3 5 min cues for fwd wt shift   Neuro Miller:         Bridge w/ PPT        SL clamshell        Prone Donkey Kick 2x8 B/L, 2x8 w/ abduction       TA w/ Shld ext   With march green 2x10 13 2 2x10    Paloff press 2x10 6 0 B  With side step 3 0 x3 ea 6 0 20x B    Supine 90-90 SLR 3x5 w/ TA       Squat lift      15# bumper plate simulate lifting tire onto car on felipe  Training + 5x   Supine march with alt hold 3x10 abd stab SKTC alternating, unable to scissor  2x10 2x10     Hook LS rot w/ft up 2x10 single leg dissociation, 2x10 single leg straight leg 2x10 decreased motion

## 2023-01-16 ENCOUNTER — OFFICE VISIT (OUTPATIENT)
Dept: PHYSICAL THERAPY | Facility: CLINIC | Age: 43
End: 2023-01-16

## 2023-01-16 DIAGNOSIS — M54.16 LUMBAR BACK PAIN WITH RADICULOPATHY AFFECTING LEFT LOWER EXTREMITY: Primary | ICD-10-CM

## 2023-01-16 NOTE — PROGRESS NOTES
Daily Note     Today's date: 2023  Patient name: Zunilda Fry  : 1980  MRN: [id number]  Referring provider: Gold Lomeli PA-C  Dx:   Encounter Diagnosis     ICD-10-CM    1  Lumbar back pain with radiculopathy affecting left lower extremity  M54 16           Start Time: 0845  Stop Time: 09  Total time in clinic (min): 45 minutes    Subjective: Pt states he is feeling really good today, felt great after his last session and felt he was able to control his abdominal contractions better  Pt denies any pain radiating down his legs prior to the start of his treatment session, only tightness in his low back  Objective: See treatment diary below      Assessment: Tolerated treatment well  Patient demonstrated fatigue post treatment, exhibited good technique with therapeutic exercises and would benefit from continued PT  Abdominal strengthening was progressed from last session with increased reps and the addition of dead bugs  Pt required frequent breaks during sets due to fatigue  Plan: Continue per plan of care  Progress treatment as tolerated  Precautions: Standard  Access Code: [email or web address]  URL: [email or web address]  Date: 2022  Prepared by:  Mayank Pwoer   Insurance :         Visits: 13 12 11 10 9   Manual:    STM        LAD                                         Ther ex:        Protocol:         TA isos        Kurt test stretch edge of table   Modified to tall kneel 3x30 without increase LE symp 3x30s 3x30s   supine hamstring stretch    SOS 3x30s SOS 3x30s   B Heel raises        Hook LS rot   20x 20x 20x   Postural ed        MONIKA        REIL  4x10 2x10  2x10 exhale   Sitting to supine transfer        ZURDO    3 min 3'    Prone hip ext  2x10 SL, 2x10 2x10 SL  2x10 B     B Hip ER str        TM         Neuro Miller:         Bridge w/ PPT        SL clamshell        Prone Donkey Kick  2x8 B/L, 2x8 w/ abduction      TA w/ Shld ext With march green 2x10 13 2 2x10   Paloff press 10x 4 0 B 2x10 6 0 B  With side step 3 0 x3 ea 6 0 20x B   Supine 90-90 SLR 15x w/TA B 3x5 w/ TA      Squat lift         Seated TA w/march on pball 10x B       Dead bugs 10x B       Supine march with alt hold 3x10 3x10 abd stab SKTC alternating, unable to scissor  2x10 2x10    Hook LS rot w/ft up 2x10 w/TA act ft up    2x10 for low back flexibility ft down 2x10 single leg dissociation, 2x10 single leg straight leg 2x10 decreased motion

## 2023-01-19 ENCOUNTER — APPOINTMENT (OUTPATIENT)
Dept: PHYSICAL THERAPY | Facility: CLINIC | Age: 43
End: 2023-01-19

## 2023-01-19 DIAGNOSIS — F90.0 ATTENTION DEFICIT HYPERACTIVITY DISORDER (ADHD), PREDOMINANTLY INATTENTIVE TYPE: ICD-10-CM

## 2023-01-23 ENCOUNTER — OFFICE VISIT (OUTPATIENT)
Dept: PHYSICAL THERAPY | Facility: CLINIC | Age: 43
End: 2023-01-23

## 2023-01-23 DIAGNOSIS — M54.16 LUMBAR BACK PAIN WITH RADICULOPATHY AFFECTING LEFT LOWER EXTREMITY: Primary | ICD-10-CM

## 2023-01-23 NOTE — PROGRESS NOTES
Daily Note     Today's date: 2023  Patient name: Freedom Miguel  : 1980  MRN: [id number]  Referring provider: Meagan Perkins PA-C  Dx:   Encounter Diagnosis     ICD-10-CM    1  Lumbar back pain with radiculopathy affecting left lower extremity  M54 16                      Subjective: Patient reports he feels a small amount of numbness in his great toe, and he has a mild headache  Objective: See treatment diary below      Assessment: Tolerated treatment well  Patient demonstrated good form with repeated TA strengthening, continued to emphasize maintenance of TA contraction with dissociation of LE  Patient presented with headache and had moderate increase in headache with core stabilization exercises  Emphasized flexibility exercises due to increased headache to reduce stress  Patient demonstrated fatigue post treatment, exhibited good technique with therapeutic exercises and would benefit from continued PT      Plan: Continue per plan of care  Progress treatment as tolerated  Progress challenge with TA strengthening as appropriate with supine exercises  Precautions: Standard  Access Code: [email or web address]  URL: [email or web address]  Date: 2022  Prepared by:  Emily Power   Insurance :         Visits: 14 13 12 11 10   Manual:  1   STM        LAD                                         Ther ex:        Protocol:         TA isos        Kurt test stretch edge of table    Modified to tall kneel 3x30 without increase LE symp 3x30s   supine hamstring stretch     SOS 3x30s   B Heel raises        Hook LS rot    20x 20x   Postural ed        MONIKA        REIL 4x10 (10x w/ lock and sag)  4x10 2x10    Sitting to supine transfer        ZURDO     3 min   Prone hip ext   2x10 SL, 2x10 2x10 SL  2x10 B    B Hip ER str        TM         Neuro Miller:         Bridge w/ PPT        SL clamshell        Prone Donkey Kick   2x8 B/L, 2x8 w/ abduction     TA w/ Shld ext With march green 2x10   Paloff press 2x10 10 0 B 10x 4 0 B 2x10 6 0 B  With side step 3 0 x3 ea   Supine 90-90 SLR 2x10 ea 15x w/TA B 3x5 w/ TA     L/S Rot 10x10" ea side       Seated TA w/march on pball  10x B      Dead bugs  10x B      Supine march with alt hold 2x10 ea 3x10 3x10 abd stab SKTC alternating, unable to scissor  2x10 2x10   Hook LS rot w/ft up 2x10 w/ TA Act, single leg dissociation into abduction 2x10 w/TA act ft up    2x10 for low back flexibility ft down 2x10 single leg dissociation, 2x10 single leg straight leg 2x10 decreased motion

## 2023-01-26 ENCOUNTER — OFFICE VISIT (OUTPATIENT)
Dept: PHYSICAL THERAPY | Facility: CLINIC | Age: 43
End: 2023-01-26

## 2023-01-26 DIAGNOSIS — M54.16 LUMBAR BACK PAIN WITH RADICULOPATHY AFFECTING LEFT LOWER EXTREMITY: Primary | ICD-10-CM

## 2023-01-26 NOTE — PROGRESS NOTES
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 which flared up both legs, pain and numbness down into the toes  Pt had to take his tylenol to help alleviate the pain  Today he was feeling burning in the shins and numbness in his big toes  Pt states extensions and press-ups were not helping  Objective: See treatment diary below      Assessment: Tolerated treatment well  Patient demonstrated fatigue post treatment, exhibited good technique with therapeutic exercises and would benefit from continued PT  Pt felt decrease in intensity of numbness with prone press-ups w/PT OP  Pt feels slight increase in symptoms once walking again, however is better than before the session  Re-assess symptoms and directional preference next visit  Plan: Continue per plan of care  Progress treatment as tolerated  Precautions: Standard  Access Code: [email or web address]  URL: [email or web address]  Date: 2022  Prepared by:  Alex Power   Insurance :         Visits: 15 14 13 12 11   Manual:    STM        LAD                                         Ther ex:        Protocol:         TA isos        Kurt test stretch edge of table     Modified to tall kneel 3x30 without increase LE symp   supine hamstring stretch        B Heel raises        Hook LS rot     20x   Postural ed        MONIKA        ZURDO 4 mins       REIL 3x10 w/PT OP 4x10 (10x w/ lock and sag)  4x10 2x10   Sitting to supine transfer        ZURDO        Prone hip ext    2x10 SL, 2x10 2x10 SL  2x10 B   B Hip ER str        TM         Neuro Miller:         Bridge w/ PPT PPT 2x10, 5s    PPT w/bridge 15x       SL clamshell        Prone Donkey Kick    2x8 B/L, 2x8 w/ abduction    TA w/ Shld ext        Paloff press  2x10 10 0 B 10x 4 0 B 2x10 6 0 B    Supine 90-90 SLR  2x10 ea 15x w/TA B 3x5 w/ TA    L/S Rot 10x10s 10x10" ea side      Seated TA w/march on pball   10x B     Dead bugs   10x B     Supine march with alt hold 10x5s B 2x10 ea 3x10 3x10 abd stab SKTC alternating, unable to scissor  2x10   Hook LS rot w/ft up 10x w/TA 2x10 w/ TA Act, single leg dissociation into abduction 2x10 w/TA act ft up    2x10 for low back flexibility ft down 2x10 single leg dissociation, 2x10 single leg straight leg 2x10 decreased motion

## 2023-01-27 ENCOUNTER — TELEPHONE (OUTPATIENT)
Dept: PAIN MEDICINE | Facility: CLINIC | Age: 43
End: 2023-01-27

## 2023-01-27 NOTE — TELEPHONE ENCOUNTER
Scheduled patient for LESI on 03/15/23  Patient denies RX blood thinners/ NSAIDS  Nothing to eat or drink 1 hour prior to procedure  Needs to arrange transportation  Proper clothing for procedure  No vaccines 2 weeks prior or after procedure  If ill or place on antibiotics, please call to reschedule

## 2023-01-30 ENCOUNTER — APPOINTMENT (OUTPATIENT)
Dept: PHYSICAL THERAPY | Facility: CLINIC | Age: 43
End: 2023-01-30

## 2023-02-01 ENCOUNTER — TELEPHONE (OUTPATIENT)
Dept: OBGYN CLINIC | Facility: HOSPITAL | Age: 43
End: 2023-02-01

## 2023-02-01 NOTE — TELEPHONE ENCOUNTER
Left message for the patient to call to reschedule his/her injection    Gave my direct phone number [phone]

## 2023-02-01 NOTE — TELEPHONE ENCOUNTER
Caller: Patient    Doctor/Office: SPA    Call regarding :  Returning call     Call was transferred to: [facility]

## 2023-02-02 ENCOUNTER — EVALUATION (OUTPATIENT)
Dept: PHYSICAL THERAPY | Facility: CLINIC | Age: 43
End: 2023-02-02

## 2023-02-02 DIAGNOSIS — M54.16 LUMBAR BACK PAIN WITH RADICULOPATHY AFFECTING LEFT LOWER EXTREMITY: Primary | ICD-10-CM

## 2023-02-02 NOTE — PROGRESS NOTES
PT Re-Evaluation     Today's date: 2023  Patient name: Jean Paul Isbell  : 1980   MRN: [id number]  Referring provider: Branden Obrien PA-C  Dx:   Encounter Diagnosis     ICD-10-CM    1  Lumbar back pain with radiculopathy affecting left lower extremity  M54 16                      Assessment  Assessment details: Jean Paul Isbell is a 38 y o  male who has been seen in physical therapy for 16 visits secondary to the diagnosis of Lumbar back pain with radiculopathy affecting left lower extremity  (primary encounter diagnosis) and is making steady gains towards established goals  The patient has demonstrated decreased pain level, improved lower extremity strength, improved core stabilization, improved postural awareness, improved spinal ROM, improved lower extremity ROM, improved flexibility and improved balance  As a result, he has been able to increase functional activities such as lifting and performing work duties   He would benefit from continued PT services to address remaining impairments outlined in Progress Note and to maximize function      Impairments: abnormal gait, abnormal or restricted ROM, activity intolerance, impaired balance, impaired physical strength, pain with function, poor posture  and poor body mechanics  Understanding of Dx/Px/POC: good   Prognosis: good    Goals  STG  + Patient will report a 35% improvement in symptoms (2-3 weeks) - Met  + Patient will be independent in basic HEP (2-3 weeks) - Met  + Patient will demonstrate an increase in range of motion  lumbar spine, extension    to  within normal limits (2-3 weeks) - met   + Patient will demonstrate proper lifting mechanics x 2 techniques to avoid future injury with verbal cuing (2-3 weeks) - In Progress       LTG: - In Progress   + Patient will report a 70% improvement in symptoms (4-6 weeks) -met  + Patient will increase FOTO score to expected score (8 sessions) -met  + Patient will be independent in comprehensive HEP (4-6 weeks)-not met  + Patient will demonstrate increase  MMT of  elbow, ankle DF and hallux extension by 1 grade  (4-6 weeks)-partially met  + Patient will demonstrate proper lifting mechanics x 3 techniques to avoid future injury independently (4-6 weeks)-ongoing  + Patient will increase SLS time by 10  seconds (2-3 weeks)-met    Plan  Plan details: 1-2 x week, 4-6 weeks: HEP development, stretching as needed per objective findings, strengthening core/lower quadrant, A/AA/PROM lumbar/hip motions, joint mobilizations prn, posture education, STM/MI as needed to reduce muscle tension per objective findings, muscle reeducation per objective findings, dynamic stabilization, PLOC discussed and agreed upon with patient  Patient would benefit from: skilled physical therapy  Frequency: 2x week  Duration in weeks: 6  Treatment plan discussed with: patient        Subjective Evaluation    History of Present Illness  Mechanism of injury: Pt reports he feels like he is at 90% of his overall function  States that he continues to see significant improvements in his pain and symptoms  Had a flare up last week that was pretty significant with symptoms to his foot  Has since resolved and is currently experiencing 1/10 numbness in toe  Pt states that performing lumbar extension and stretching resolve his symptoms and is much more manageable now  Symptoms will increase with working in odd positions, lifting, and performing strenuous activity    Pain  Current pain ratin  At best pain ratin  At worst pain ratin  Quality: radiating, throbbing, discomfort and cramping  Relieving factors: change in position  Progression: improved    Patient Goals  Patient goals for therapy: decreased pain, return to sport/leisure activities and increased strength          Objective     Static Posture     Comments  Concurrent Complaints:  Negative for bladder dysfunction, bowel dysfunction and saddle (S4) numbness    Posture  Fair sitting posture    Gait: WNL    Palpation: no TTP along spinous process  Functional movements   Squat: wnl no pain    SLS: left: 18 sec    Right : 25 sec     Unilateral heel raises: 15 L vs 20+ R          Dermatomes  L2: (proximal lateral thigh) :    Right: normal     Left: normal  L3:(distal medial thigh)    Right: normal    Left: normal  L4(medial lower leg ) :    Right: normal   Left: normal  L5 (distal lateral lower leg):    Right: normal    Left normal  LS1 (achilles tendon):    Right: normal     Left normal    Myotomes   L2 (hip flexion): Right 5/5  Left: 5/5   L3 (knee extension): Right: 5/5 Left: 5/5   L4 (ankle dorsiflexion): Right: 5/5 Left: 5/5   L5 (hallux extension): Right: 5/5  Left: 4/5   S1 (ankle plantarflexion): Right: 5/5 Left: 5/5      Lumbar ROM    Flexion: wnl LOM (-) pain   Extension: wnl LOM (-) pain   Side bend: Right: wnl LOM     Left: wnl LOM     Rotation: right: wnl     Left: wnl    Repeated motions: BL: no symptoms  Standing flexion: no change  Standing extension: centralization      Flexibility    Hamstring: Right: WNL  Left: WNL  Hip flexors:Right: poor Left: poor    Hip ROM/MMT: prn                 Precautions: [place]  Access Code: [email or web address]  URL: [email or web address]  Date: 11/28/2022  Prepared by:  George Power 12/28  Insurance :         Visits: 9 8 7 6 5   Manual: 2/2 12/28 12/22 12/19 12/15   STM   Lumbar musculature     LAD    RLE 3x30s                                     Ther ex:        Protocol:         TA isos        Kurt test stretch edge of table Prone quad hold 30 x3 ea   1 min     supine hamstring stretch   SOS 3x30s Supine SOS 1 min Seated 3x30s   B Heel raises 2x10 2x10 off step      Hook LS rot   20x 2x10 2x10   Postural ed        MONIKA  10x which P symptoms then DC 2x10 w/ exhale  REIL 10x  10x w/PT OP   REIL 3x10 with manual OP 2x10 decreased and centralizing symp  10x (3x) throughout session      Sitting to supine transfer        ZURDO 3 min 3' pre 2 min 3'   Standing hip ext    10x B changed prone 10x B  2x10 B   B Hip ER str        TM   Ambulation 4 degrees incline 3 5 min cues for fwd wt shift      Neuro Miller:         Bridge w/ PPT   2x10 2x10 2x10   SL clamshell        STS         TA w/ Shld ext [employer] 5, 2x10   Nathan 10 0 2x10 nathan 5 0 2x10 nathan 5 0 2x10   Paloff press    nathan 3 0 5x ea nathan 3 0 5x ea   Glut Sets    10x5"     Squat lift   15# bumper plate simulate lifting tire onto car on felipe  Training + 5x

## 2023-02-16 ENCOUNTER — TELEPHONE (OUTPATIENT)
Dept: SLEEP CENTER | Facility: CLINIC | Age: 43
End: 2023-02-16

## 2023-02-17 NOTE — TELEPHONE ENCOUNTER
I have reached out to the pt  about his resupply and billing issues and I am still working with my company to correct them

## 2023-02-24 ENCOUNTER — APPOINTMENT (OUTPATIENT)
Dept: RADIOLOGY | Facility: HOSPITAL | Age: 43
End: 2023-02-24

## 2023-02-24 ENCOUNTER — HOSPITAL ENCOUNTER (OUTPATIENT)
Facility: AMBULARY SURGERY CENTER | Age: 43
Setting detail: OUTPATIENT SURGERY
Discharge: HOME | End: 2023-02-24
Attending: PHYSICAL MEDICINE & REHABILITATION | Admitting: PHYSICAL MEDICINE & REHABILITATION

## 2023-02-24 VITALS
OXYGEN SATURATION: 97 % | SYSTOLIC BLOOD PRESSURE: 156 MMHG | RESPIRATION RATE: 18 BRPM | TEMPERATURE: 97.3 F | DIASTOLIC BLOOD PRESSURE: 83 MMHG | HEART RATE: 81 BPM

## 2023-02-24 RX ORDER — LIDOCAINE HYDROCHLORIDE 10 MG/ML
INJECTION, SOLUTION EPIDURAL; INFILTRATION; INTRACAUDAL; PERINEURAL AS NEEDED
Status: DISCONTINUED | OUTPATIENT
Start: 2023-02-24 | End: 2023-02-24 | Stop reason: HOSPADM

## 2023-02-24 RX ORDER — SODIUM CHLORIDE 9 MG/ML
INJECTION INTRAVENOUS AS NEEDED
Status: DISCONTINUED | OUTPATIENT
Start: 2023-02-24 | End: 2023-02-24 | Stop reason: HOSPADM

## 2023-02-24 RX ORDER — METHYLPREDNISOLONE ACETATE 40 MG/ML
INJECTION, SUSPENSION INTRA-ARTICULAR; INTRALESIONAL; INTRAMUSCULAR; SOFT TISSUE AS NEEDED
Status: DISCONTINUED | OUTPATIENT
Start: 2023-02-24 | End: 2023-02-24 | Stop reason: HOSPADM

## 2023-02-24 NOTE — DISCHARGE INSTRUCTIONS
Epidural Steroid Injection   WHAT YOU NEED TO KNOW:   An epidural steroid injection (MK) is a procedure to inject steroid medicine into the epidural space  The epidural space is between your spinal cord and vertebrae  Steroids reduce inflammation and fluid buildup in your spine that may be causing pain  You may be given pain medicine along with the steroids  ACTIVITY  Do not drive or operate machinery today  No strenuous activity today - bending, lifting, etc   You may resume normal activites starting tomorrow - start slowly and as tolerated  You may shower today, but no tub baths or hot tubs  You may have numbness for several hours from the local anesthetic  Please use caution and common sense, especially with weight-bearing activities  CARE OF THE INJECTION SITE  If you have soreness or pain, apply ice to the area today (20 minutes on/20 minutes off)  Starting tomorrow, you may use warm, moist heat or ice if needed  You may have an increase or change in your discomfort for 36-48 hours after your treatment  Apply ice and continue with any pain medication you have been prescribed  Notify the [facility] if you have any of the following: redness, drainage, swelling, headache, stiff neck or fever above 100°F     SPECIAL INSTRUCTIONS  Our office will contact you in approximately 7 days for a progress report  MEDICATIONS  Continue to take all routine medications  Our office may have instructed you to hold some medications  As no general anesthesia was used in today's procedure, you should not experience any side effects related to anesthesia  If you are diabetic, the steroids used in today's injection may temporarily increase your blood sugar levels after the first few days after your injection  Please keep a close eye on your sugars and alert the doctor who manages your diabetes if your sugars are significantly high from your baseline or you are symptomatic       If you have a problem specifically related to your procedure, please call our office at [phone]  Problems not related to your procedure should be directed to your primary care physician

## 2023-02-24 NOTE — H&P
History of Present Illness: The patient is a 38 y o  male who presents with complaints of low back pain  Past Medical History:   Diagnosis Date   • ADHD (attention deficit hyperactivity disorder)    • CPAP (continuous positive airway pressure) dependence    • Hypersomnolence    • Hypertension    • Hypoxia    • Infrapatellar bursitis of right knee    • Mononucleosis    • Mycobacterial infectious disease    • JAMIE (obstructive sleep apnea)    • Pneumonia    • Right upper lobe pneumonia 10/7/2020   • Varicella        Past Surgical History:   Procedure Laterality Date   • BRONCHOSCOPY     • CARPAL TUNNEL RELEASE Bilateral    • CIRCUMCISION     • EPIDURAL BLOCK INJECTION Left 11/30/2022    Procedure: L4-L5, L5-S1  TRANSFORAMINAL epidural steroid injection ([id number]); Surgeon: Ashlee Martin DO;  Location: [facility] MAIN OR;  Service: Pain Management    • TONSILLECTOMY     • WISDOM TOOTH EXTRACTION      X1        No current facility-administered medications for this encounter      Allergies   Allergen Reactions   • Olmesartan-Amlodipine-Hctz Edema   • Terazosin Edema       Physical Exam:   Vitals:    02/24/23 0809   BP: 156/83   Pulse: 81   Resp: 18   Temp: (!) 97 3 °F (36 3 °C)   SpO2: 97%     General: Awake, Alert, Oriented x 3, Mood and affect appropriate  Respiratory: Respirations even and unlabored  Cardiovascular: Peripheral pulses intact; no edema  Musculoskeletal Exam: Tenderness palpation bilateral lumbar paraspinals    ASA Score: 2    Patient/Chart Verification  Patient ID Verified: Verbal, Armband  ID Band Applied: Yes  Consents Confirmed: Procedural  H&P( within 30 days) Verified: Yes  Interval H&P(within 24 hr) Complete (required for Outpatients and Surgery Admit only): Yes  Beta Blocker given : N/A  Pre-op Lab/Test Results Available: N/A  Does Patient Have a Prosthetic Device/Implant: No    Assessment: Lumbar radiculopathy  Plan: L3-L4 LESI

## 2023-02-24 NOTE — OP NOTE
OPERATIVE REPORT  PATIENT NAME: Omid Adams    :  1980  MRN: [id number]  Pt Location: [facility] [place] MINOR/PAIN ROOM 01    SURGERY DATE: 2023    Surgeon(s) and Role:     * Wily Grigsby DO - Primary    Preop Diagnosis:  Lumbar radiculopathy [M54 16]  Spinal stenosis of lumbar region, unspecified whether neurogenic claudication present [M48 061]    Post-Op Diagnosis Codes:     * Lumbar radiculopathy [M54 16]     * Spinal stenosis of lumbar region, unspecified whether neurogenic claudication present [M48 061]    Procedure(s):  L3 L4  LUMBAR epdirual steroid injection (23918)    Lumbar epidural  Indication:  Back and radiating leg pain  Preoperative diagnosis:  Lumbar radiculitis  Postoperative diagnosis:  Lumbar radiculitis    Procedure: Fluoroscopically-guided L3-L4 interlaminar epidural steroid injection under fluoroscopy    EBL:  none  Specimens:  not applicable    After discussing the risks, benefits, and alternatives to the procedure, the patient expressed understanding and wished to proceed  The patient was brought to the fluoroscopy suite and placed in the prone position  A procedural pause was conducted to verify:  correct patient identity, procedure to be performed and as applicable, correct side and site, correct patient position, and availability of implants, special equipment and special requirements  After identifying the L3-L4 space fluoroscopically, the skin was sterilely prepped and draped in the usual fashion using Chloraprep skin prep  The skin and subcutaneous tissues were anesthetized with 1% lidocaine  Utilizing a loss of resistance technique and intermittent fluoroscopic guidance, a 3 5 inch 20-gauge Tuohy needle was advanced into the epidural space  Proper needle positioning was confirmed using multiple fluoroscopic views  After negative aspiration, Omnipaque 240 contrast was injected confirming epidural spread without evidence of intravascular or intrathecal spread    A 4 ml solution consisting of 80 mg Depo-Medrol in sterile saline was injected slowly and incrementally into the epidural space  Following the injection the needle was withdrawn slightly and flushed with 1% buffered lidocaine as it was fully extracted  The patient tolerated the procedure well and there were no apparent complications  After appropriate observation, the patient was dismissed from the clinic in good condition under their own power            SIGNATURE: Maddy Alvarado DO  DATE: February 24, 2023  TIME: 9:03 AM

## 2023-03-03 ENCOUNTER — TELEPHONE (OUTPATIENT)
Dept: RADIOLOGY | Facility: MEDICAL CENTER | Age: 43
End: 2023-03-03

## 2023-03-20 ENCOUNTER — TELEPHONE (OUTPATIENT)
Dept: BARIATRICS | Facility: CLINIC | Age: 43
End: 2023-03-20

## 2023-03-20 ENCOUNTER — OFFICE VISIT (OUTPATIENT)
Dept: BARIATRICS | Facility: CLINIC | Age: 43
End: 2023-03-20

## 2023-03-20 VITALS
SYSTOLIC BLOOD PRESSURE: 142 MMHG | BODY MASS INDEX: 42.66 KG/M2 | HEIGHT: 72 IN | WEIGHT: 315 LBS | DIASTOLIC BLOOD PRESSURE: 78 MMHG | HEART RATE: 82 BPM

## 2023-03-20 DIAGNOSIS — I10 PRIMARY HYPERTENSION: ICD-10-CM

## 2023-03-20 DIAGNOSIS — R73.01 IMPAIRED FASTING GLUCOSE: ICD-10-CM

## 2023-03-20 DIAGNOSIS — G47.33 OSA (OBSTRUCTIVE SLEEP APNEA): ICD-10-CM

## 2023-03-20 DIAGNOSIS — F90.0 ATTENTION DEFICIT HYPERACTIVITY DISORDER (ADHD), PREDOMINANTLY INATTENTIVE TYPE: ICD-10-CM

## 2023-03-20 DIAGNOSIS — E66.01 MORBID OBESITY WITH BMI OF 40.0-44.9, ADULT (HCC): Primary | ICD-10-CM

## 2023-03-20 NOTE — TELEPHONE ENCOUNTER
Patient called today in regards to medication that you and him had discussed at his appt  Pt states that his primary prescription coverage is with [employer] [facility] ([facility]) and you can send the medication in and see if we can get his prior auth approved  I advised patient that I would send you this note and let you know

## 2023-03-20 NOTE — PROGRESS NOTES
Assessment/Plan: Morbid obesity with BMI of 40 0-44 9, adult ([facility] [address] )  - Discussed options of HealthyCORE-Intensive Lifestyle Intervention Program, Very Low Calorie Diet-VLCD, Conservative Program, Jennifer-En-Y Gastric Bypass and Vertical Sleeve Gastrectomy and the role of weight loss medications  - Explained the importance of making lifestyle changes in addition to starting anti-obesity medications  - Patient is interested in pursuing Conservative Program with visits with [facility] provider and medication management  - Initial weight loss goal of 5-10% weight loss for improved health  - Weight loss can improve patient's co-morbid conditions and/or prevent weight-related complications  - Labs reviewed from 12/2022    Medications discussed:  Phentermine to be avoided due to Vyvanse use  wegovy discussed and patient showed interest - insurance will cover with prior authorization    Wegovy Instructions:    -Begin Wegovy 0 25 mg subcutaneously once a week  Will increase the dose each month until we get to goal dose  -Please message me a few days after you take the first pen so that I can submit for the next higher dose for the next month  (Often we need to do another preauth each time we increase the dose and I want to allow enough time to do that )  - Visit wegovy  com for further information/injection instructions    -Please eat small frequent meals to help reduce nausea  Lemon water and saltine crackers may help with this  - Side effects of Wegovy discussed: nausea, vomiting, diarrhea, and constipation  If you experience fever, nausea/vomiting, and pain radiating to your back this may be a sign of pancreatitis  Please go to the emergency room if this occurs  - Patient understands the side effects of the medication and proper administration  Patient agrees with the treatment plan and all questions were answered  Goals:  Do not skip meals    Begin to follow meal plan that was provided by dietician in the past   Food log via kerrie - options include  www  myfitnesspal com, sparkpeople  com, loseit com, calorieking  com, [employer]  No sugary beverages  At least 64oz of water daily  Increase physical activity by 10 minutes daily  Gradually increase physical activity to a goal of 5 days per week for 30 minutes of MODERATE intensity PLUS 2 days per week of FULL BODY resistance training    ADHD (attention deficit hyperactivity disorder)  Taking Vyvnase  Phentermine to be avoided to treat weight loss    Hypertension  BP slightly elevated today in the office - 142/78  Weight loss may help improve blood pressure control  Managed on losartan 100mg daily  Will follow up with PCP    JAMIE (obstructive sleep apnea)  Discussed the risks and benefits of using CPAP machine and will continue to attempt to use nightly    Impaired fasting glucose  A1C in the past was prediabetic  Last A1C was 5 5  Weight loss will likely help blood sugar control         Rika Johnson was seen today for consult  Diagnoses and all orders for this visit:    Morbid obesity with BMI of 40 0-44 9, adult ([facility] [address] )    Attention deficit hyperactivity disorder (ADHD), predominantly inattentive type    Primary hypertension    JAMIE (obstructive sleep apnea)    Impaired fasting glucose         Patient denies personal history of pancreatitis  Patient also denies personal and family history of medullary thyroid cancer and multiple endocrine neoplasia type 2 (MEN 2 tumor)  Patient denies any history of kidney stones, seizures, or glaucoma  43      Total time spent reviewing chart, interviewing patient, examining patient, discussing plan, answering all questions, and documentin min, with >50% face-to-face time spent counseling patient on nonsurgical interventions for the treatment of excess weight  Discussed in detail nonsurgical options including intensive lifestyle intervention program, very low-calorie diet program and conservative program   Discussed the role of weight loss medications  Counseled patient on diet behavior and exercise modification for weight loss  Follow up in approximately 6 weeks with Non-Surgical Physician/Advanced Practitioner  Subjective:   Chief Complaint   Patient presents with   • Consult     Pt is here for [facility] consult  Patient ID: Adela Viramontes  is a 38 y o  male with excess weight/obesity here to pursue weight management  Previous notes and records have been reviewed  Past Medical History:   Diagnosis Date   • ADHD (attention deficit hyperactivity disorder)    • CPAP (continuous positive airway pressure) dependence    • Hypersomnolence    • Hypertension    • Hypoxia    • Infrapatellar bursitis of right knee    • Mononucleosis    • Mycobacterial infectious disease    • JAMIE (obstructive sleep apnea)    • Pneumonia    • Right upper lobe pneumonia 10/7/2020   • Varicella      Past Surgical History:   Procedure Laterality Date   • BRONCHOSCOPY     • CARPAL TUNNEL RELEASE Bilateral    • CIRCUMCISION     • EPIDURAL BLOCK INJECTION Left 11/30/2022    Procedure: L4-L5, L5-S1  TRANSFORAMINAL epidural steroid injection ([id number]); Surgeon: Anjana Farris DO;  Location: [facility] MAIN OR;  Service: Pain Management    • LUMBAR EPIDURAL INJECTION N/A 2/24/2023    Procedure: L3 L4  LUMBAR epdirual steroid injection ([id number]);   Surgeon: Anjana Farris DO;  Location: [facility] MAIN OR;  Service: Pain Management    • TONSILLECTOMY     • WISDOM TOOTH EXTRACTION      X1        HPI:  Wt Readings from Last 20 Encounters:   03/20/23 (!) 147 kg (324 lb 12 8 oz)   01/10/23 (!) 147 kg (325 lb)   01/03/23 (!) 149 kg (329 lb)   11/18/22 (!) 149 kg (329 lb)   11/14/22 (!) 147 kg (325 lb)   11/08/22 (!) 147 kg (325 lb)   09/19/22 (!) 148 kg (325 lb 12 8 oz)   08/22/22 (!) 149 kg (327 lb 6 4 oz)   04/20/22 (!) 154 kg (340 lb)   03/07/22 (!) 149 kg (327 lb 6 4 oz)   02/07/22 101 kg (222 lb)   11/05/21 (!) 145 kg (319 lb)   09/10/21 (!) 143 kg (315 lb)   12/14/20 (!) 147 kg (325 lb)   10/19/20 (!) 145 kg (320 lb)   10/19/20 (!) 143 kg (316 lb)   10/07/20 (!) 146 kg (322 lb)   10/06/20 (!) 146 kg (322 lb)   09/25/20 (!) 146 kg (321 lb 8 oz)   09/17/20 (!) 149 kg (328 lb 7 8 oz)       Patient presents today to medical weight management office for consult  Patient has been here twice in the past to do VLCD and was able to lose weight but as soon as the diet ended the weight came back on  He does not want to consider bariatric surgery yet - but will consider if not successful this attempt  He has 2 young children at home and wife has been breastfeeding, so they have been eating more and snacking more  Diet will be changing as his wife is no longer breastfeeding, so they are working together to gets meals back on track and limit the snacking  He is also going back to the diets the dietician had reviewed with him in the past   He is currently seeking treatment for herniated disc in his back - recently had a steroid injection and symptoms have subsided  He wears a CPAP at night but not as compliant since his kids aren't sleeping through the night and he is often getting up  He was tried on metformin in the past - did not tolerate due to GI side effects        Obesity/Excess Weight:  Severity: Severe  Onset:  lifelong    Modifiers: Diet and Exercise and Physician Supervised Weight Loss Program  Contributing factors: Poor Food Choices, Insufficient Physical Activity, Stress/Emotional Eating and Insufficient time to make appropriate lifestyle changes  Associated symptoms: comorbid conditions, fatigue, increased joint pain and decreased mobility    Diet recall:  B: 6am [employer] with cream and donut  S: no  L: 12pm [employer], [employer], [employer] (chicken parm)  S: sweets - cookies  D: take out or hello fresh  S: no    Hydration: water - 4 bottles at least, rare soda and iced tea  Alcohol: very rare  Smoking: no  Exercise: , no formal exercise  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  Sleep: not well - 2 young kids, can't use CPAP every night due to kids    Highest weight: 340 lbs  Current weight: 324 lbs       The following portions of the patient's history were reviewed and updated as appropriate: allergies, current medications, past family history, past medical history, past social history, past surgical history, and problem list     Family History   Problem Relation Age of Onset   • Hypertension Mother    • Diabetes Mother         pre-diabetic    • Heart attack Father    • Diabetes Father    • Hypertension Father    • Obesity Father         hx LRYGB   • Heart disease Father         hx MI age 36   • Arthritis Family    • Cancer Family    • Cancer Paternal Grandmother         hx bladder cancer   • Heart attack Paternal Grandfather    • No Known Problems Son    • Stroke Neg Hx    • Thyroid disease Neg Hx         Review of Systems   Constitutional: Negative for fatigue  HENT: Negative for sore throat  Respiratory: Negative for cough and shortness of breath  Cardiovascular: Negative for chest pain, palpitations and leg swelling  Gastrointestinal: Negative for abdominal pain, constipation, diarrhea and nausea  Genitourinary: Negative for dysuria  Musculoskeletal: Positive for back pain  Negative for arthralgias  Skin: Negative for rash  Neurological: Negative for headaches  Psychiatric/Behavioral: Negative for dysphoric mood  The patient is not nervous/anxious  Objective:  /78   Pulse 82   Ht 6' 0 25" (1 835 m)   Wt (!) 147 kg (324 lb 12 8 oz)   BMI 43 75 kg/m²     Physical Exam  Vitals and nursing note reviewed  Constitutional:       Appearance: Normal appearance  He is obese  HENT:      Head: Normocephalic  Pulmonary:      Effort: Pulmonary effort is normal    Neurological:      General: No focal deficit present  Mental Status: He is alert and oriented to person, place, and time     Psychiatric:         Mood and Affect: Mood normal  Behavior: Behavior normal          Thought Content: Thought content normal          Judgment: Judgment normal                 Labs and Imaging  Recent labs and imaging have been personally reviewed    Lab Results   Component Value Date    WBC 6 22 11/07/2020    HGB 14 0 11/07/2020    HCT 43 4 11/07/2020    MCV 86 11/07/2020     11/07/2020     Lab Results   Component Value Date    SODIUM 135 12/05/2022    K 3 8 12/05/2022     12/05/2022    CO2 29 12/05/2022    AGAP 3 (L) 12/05/2022    BUN 16 12/05/2022    CREATININE 0 78 12/05/2022    GLUC 102 10/09/2020    GLUF 97 12/05/2022    CALCIUM 9 0 12/05/2022    AST 31 12/05/2022    ALT 51 12/05/2022    ALKPHOS 88 12/05/2022    TP 7 3 12/05/2022    TBILI 0 49 12/05/2022    EGFR 112 12/05/2022     Lab Results   Component Value Date    HGBA1C 5 5 12/05/2022     Lab Results   Component Value Date    IRW8APNTTVVM 2 160 07/07/2020     Lab Results   Component Value Date    CHOLESTEROL 138 12/05/2022     Lab Results   Component Value Date    HDL 31 (L) 12/05/2022     Lab Results   Component Value Date    TRIG 150 12/05/2022     Lab Results   Component Value Date    LDLCALC 77 12/05/2022

## 2023-03-20 NOTE — ASSESSMENT & PLAN NOTE
- Discussed options of HealthyCORE-Intensive Lifestyle Intervention Program, Very Low Calorie Diet-VLCD, Conservative Program, Jennifer-En-Y Gastric Bypass and Vertical Sleeve Gastrectomy and the role of weight loss medications  - Explained the importance of making lifestyle changes in addition to starting anti-obesity medications  - Patient is interested in pursuing Conservative Program with visits with [facility] provider and medication management  - Initial weight loss goal of 5-10% weight loss for improved health  - Weight loss can improve patient's co-morbid conditions and/or prevent weight-related complications  - Labs reviewed from 12/2022    Medications discussed:  Phentermine to be avoided due to Vyvanse use  wegovy discussed and patient showed interest - insurance will cover with prior authorization    Wegovy Instructions:    -Begin Wegovy 0 25 mg subcutaneously once a week  Will increase the dose each month until we get to goal dose  -Please message me a few days after you take the first pen so that I can submit for the next higher dose for the next month  (Often we need to do another preauth each time we increase the dose and I want to allow enough time to do that )  - Visit wegovy  com for further information/injection instructions    -Please eat small frequent meals to help reduce nausea  Lemon water and saltine crackers may help with this  - Side effects of Wegovy discussed: nausea, vomiting, diarrhea, and constipation  If you experience fever, nausea/vomiting, and pain radiating to your back this may be a sign of pancreatitis  Please go to the emergency room if this occurs  - Patient understands the side effects of the medication and proper administration  Patient agrees with the treatment plan and all questions were answered  Goals:  Do not skip meals  Begin to follow meal plan that was provided by dietician in the past   Food log via kerrie - options include  www  myfitnesspal com, sparkpeople  com, loseit com, calorieking  com, [employer]  No sugary beverages  At least 64oz of water daily  Increase physical activity by 10 minutes daily   Gradually increase physical activity to a goal of 5 days per week for 30 minutes of MODERATE intensity PLUS 2 days per week of FULL BODY resistance training

## 2023-03-20 NOTE — ASSESSMENT & PLAN NOTE
BP slightly elevated today in the office - 142/78  Weight loss may help improve blood pressure control  Managed on losartan 100mg daily  Will follow up with PCP

## 2023-03-24 NOTE — TELEPHONE ENCOUNTER
PA Case: [id number], Status: Approved, Coverage Starts on: 3/20/2023 12:00 AM, Coverage Ends on: 3/20/2024 12:00 AM  Questions?  Contact [id number]

## 2023-04-03 ENCOUNTER — OFFICE VISIT (OUTPATIENT)
Dept: FAMILY MEDICINE CLINIC | Facility: CLINIC | Age: 43
End: 2023-04-03

## 2023-04-03 VITALS
DIASTOLIC BLOOD PRESSURE: 80 MMHG | OXYGEN SATURATION: 97 % | WEIGHT: 315 LBS | SYSTOLIC BLOOD PRESSURE: 136 MMHG | RESPIRATION RATE: 16 BRPM | TEMPERATURE: 97.5 F | HEIGHT: 72 IN | BODY MASS INDEX: 42.66 KG/M2 | HEART RATE: 81 BPM

## 2023-04-03 DIAGNOSIS — I10 PRIMARY HYPERTENSION: ICD-10-CM

## 2023-04-03 DIAGNOSIS — M54.16 LUMBAR RADICULOPATHY: ICD-10-CM

## 2023-04-03 DIAGNOSIS — E66.01 MORBID OBESITY WITH BMI OF 40.0-44.9, ADULT (HCC): ICD-10-CM

## 2023-04-03 DIAGNOSIS — G47.33 OSA (OBSTRUCTIVE SLEEP APNEA): ICD-10-CM

## 2023-04-03 DIAGNOSIS — J45.20 MILD INTERMITTENT ASTHMA WITHOUT COMPLICATION: ICD-10-CM

## 2023-04-03 DIAGNOSIS — Z13.6 SCREENING FOR CARDIOVASCULAR CONDITION: ICD-10-CM

## 2023-04-03 DIAGNOSIS — R73.01 IMPAIRED FASTING GLUCOSE: Primary | ICD-10-CM

## 2023-04-03 DIAGNOSIS — F90.0 ATTENTION DEFICIT HYPERACTIVITY DISORDER (ADHD), PREDOMINANTLY INATTENTIVE TYPE: ICD-10-CM

## 2023-04-03 NOTE — PROGRESS NOTES
FAMILY PRACTICE OFFICE VISIT       NAME: Leandro Florence  AGE: 38 y o  SEX: male       : 1980        MRN: [id number]    DATE: 4/3/2023  TIME: 9:45 AM    Assessment and Plan   1  Impaired fasting glucose  Comments:  Pt stable  He is to continue a lower carb/salt salt diet, and get regular, low impact exercise  Repeat FBW with A1c  Orders:  -     HEMOGLOBIN A1C W/ EAG ESTIMATION; Future  -     Comprehensive metabolic panel; Future    2  Primary hypertension  Comments:  Reasonable control on present management  3  Mild intermittent asthma without complication  Comments: All stable  On CPAP  Sees Pulm/Sleep Med       4  JAMIE (obstructive sleep apnea)  Comments:  As above  5  Attention deficit hyperactivity disorder (ADHD), predominantly inattentive type  Comments:  Stable - on Vyvanse; prescribed by Sleep Med     6  Morbid obesity with BMI of 40 0-44 9, adult ([address] )  Comments:  Stable - starting Weygovy  7  Lumbar radiculopathy  Comments:  Doing well post-ESIs with Pain Management  8  Screening for cardiovascular condition  -     Lipid Panel with Direct LDL reflex; Future         There are no Patient Instructions on file for this visit  Chief Complaint     Chief Complaint   Patient presents with   • Follow-up     Patient being seen for 6 month follow up        History of Present Illness   Leandro Florence is a 38y o -year-old male who presents in f/u  He just started [facility] this past week with Medical B[facility]  Seeing Pain Management for his back -> recently had MK x 2 with Dr [employer]  Out of PT -> doing own stretches and better  Continues to work with [email or web address]  On CPAP  Reviewed last labs from 2022 -> A1c 5 5%, LDL 77, Vit D 26  Review of Systems   Review of Systems   Constitutional: Negative for activity change  Respiratory: Negative for shortness of breath  Cardiovascular: Negative for chest pain  Musculoskeletal: Negative for back pain  Psychiatric/Behavioral: Negative for sleep disturbance  Active Problem List     Patient Active Problem List   Diagnosis   • Pneumonia   • ADHD (attention deficit hyperactivity disorder)   • Hypertension   • Morbid obesity with BMI of 40 0-44 9, adult (HCC)   • Impaired fasting glucose   • Asthma   • JAMIE (obstructive sleep apnea)   • Excessive daytime sleepiness   • Hypersomnolence   • Infrapatellar bursitis of right knee   • Influenza   • Chronic fatigue   • Right upper lobe pneumonia   • Positive blood culture   • Periodic limb movement   • COVID-19   • Lumbar radiculopathy         Past Medical History:  Past Medical History:   Diagnosis Date   • ADHD (attention deficit hyperactivity disorder)    • CPAP (continuous positive airway pressure) dependence    • Hypersomnolence    • Hypertension    • Hypoxia    • Infrapatellar bursitis of right knee    • Mononucleosis    • Mycobacterial infectious disease    • JAMIE (obstructive sleep apnea)    • Pneumonia    • Right upper lobe pneumonia 10/7/2020   • Varicella        Past Surgical History:  Past Surgical History:   Procedure Laterality Date   • BRONCHOSCOPY     • CARPAL TUNNEL RELEASE Bilateral    • CIRCUMCISION     • EPIDURAL BLOCK INJECTION Left 11/30/2022    Procedure: L4-L5, L5-S1  TRANSFORAMINAL epidural steroid injection ([id number]); Surgeon: Declan Schwartz DO;  Location: [facility] MAIN OR;  Service: Pain Management    • LUMBAR EPIDURAL INJECTION N/A 2/24/2023    Procedure: L3 L4  LUMBAR epdirual steroid injection ([id number]);   Surgeon: Declan Schwartz DO;  Location: [facility] MAIN OR;  Service: Pain Management    • TONSILLECTOMY     • WISDOM TOOTH EXTRACTION      X1        Family History:  Family History   Problem Relation Age of Onset   • Hypertension Mother    • Diabetes Mother         pre-diabetic    • Heart attack Father    • Diabetes Father    • Hypertension Father    • Obesity Father         hx LRYGB   • Heart disease Father         hx MI age 36   • Arthritis Family    • Cancer Family    • Cancer Paternal Grandmother         hx bladder cancer   • Heart attack Paternal Grandfather    • No Known Problems Son    • Stroke Neg Hx    • Thyroid disease Neg Hx        Social History:  Social History     Socioeconomic History   • Marital status: /Civil Union     Spouse name: jeronimo    • Number of children: 1   • Years of education: Not on file   • Highest education level: Not on file   Occupational History   • Not on file   Tobacco Use   • Smoking status: Never   • Smokeless tobacco: Never   Vaping Use   • Vaping Use: Former   • Substances: Flavoring   Substance and Sexual Activity   • Alcohol use: Yes     Comment: rare   • Drug use: No   • Sexual activity: Yes     Partners: Female   Other Topics Concern   • Not on file   Social History Narrative    Who lives in your home: Wife and son     What type of home do you live in: Single house    Age of your home: 1996     How long have you been living there: 2016    Type of heat: Forced hot air    Type of fuel: Oil    What type of kaur is in your bedroom: Hardwood floor    Do you have the following in or near your home:    Air products: Central air, Air , [employer] and Dehumidifier    Pests: None    Pets: Dogs ([address]  and [place])     Are pets allowed in bedroom: Yes    Open fields, wooded areas nearby: Open fields and Wooded areas    Basement: Damp and Unfinished    Exposure to second hand smoke: No        Habits:    Caffeine: coffee 1 cup daily-soda and ice tea  minimally- hot tea when he's sick     Chocolate: 1x a month     Other:     Social Determinants of Health     Financial Resource Strain: Not on file   Food Insecurity: Not on file   Transportation Needs: Not on file   Physical Activity: Not on file   Stress: Not on file   Social Connections: Not on file   Intimate Partner Violence: Not on file   Housing Stability: Not on file       Objective     Vitals:    04/03/23 0925   BP: 136/80   Pulse: 81   Resp: 16   Temp: 97 5 °F (36 4 °C)   SpO2: 97%     Wt Readings from Last 3 Encounters:   04/03/23 (!) 150 kg (330 lb 12 8 oz)   03/20/23 (!) 147 kg (324 lb 12 8 oz)   01/10/23 (!) 147 kg (325 lb)       Physical Exam  Vitals and nursing note reviewed  Constitutional:       General: He is not in acute distress  Appearance: Normal appearance  He is not ill-appearing, toxic-appearing or diaphoretic  HENT:      Head: Normocephalic and atraumatic  Eyes:      General: No scleral icterus  Conjunctiva/sclera: Conjunctivae normal    Neck:      Vascular: No carotid bruit  Cardiovascular:      Rate and Rhythm: Normal rate and regular rhythm  Heart sounds: Normal heart sounds  No murmur heard  No friction rub  No gallop  Pulmonary:      Effort: Pulmonary effort is normal  No respiratory distress  Breath sounds: Normal breath sounds  No stridor  No wheezing, rhonchi or rales  Musculoskeletal:      Cervical back: Normal range of motion and neck supple  No rigidity or tenderness  Lymphadenopathy:      Cervical: No cervical adenopathy  Neurological:      Mental Status: He is alert and oriented to person, place, and time  Psychiatric:         Mood and Affect: Mood normal          Behavior: Behavior normal          Thought Content:  Thought content normal          Pertinent Laboratory/Diagnostic Studies:  Lab Results   Component Value Date    GLUCOSE 122 02/10/2016    BUN 16 12/05/2022    CREATININE 0 78 12/05/2022    CALCIUM 9 0 12/05/2022    K 3 8 12/05/2022    CO2 29 12/05/2022     12/05/2022     Lab Results   Component Value Date    ALT 51 12/05/2022    AST 31 12/05/2022    ALKPHOS 88 12/05/2022       Lab Results   Component Value Date    WBC 6 22 11/07/2020    HGB 14 0 11/07/2020    HCT 43 4 11/07/2020    MCV 86 11/07/2020     11/07/2020       No results found for: TSH    No results found for: CHOL  Lab Results   Component Value Date    TRIG 150 12/05/2022     Lab Results   Component Value Date    HDL 31 (L) 12/05/2022     Lab Results   Component Value Date    LDLCALC 77 12/05/2022     Lab Results   Component Value Date    HGBA1C 5 5 12/05/2022       Results for orders placed or performed in visit on 12/05/22   Hemoglobin A1C   Result Value Ref Range    Hemoglobin A1C 5 5 Normal 3 8-5 6%; PreDiabetic 5 7-6 4%; Diabetic >=6 5%; Glycemic control for adults with diabetes <7 0% %     mg/dl       Orders Placed This Encounter   Procedures   • HEMOGLOBIN A1C W/ EAG ESTIMATION   • Comprehensive metabolic panel   • Lipid Panel with Direct LDL reflex       ALLERGIES:  Allergies   Allergen Reactions   • Olmesartan-Amlodipine-Hctz Edema   • Terazosin Edema       Current Medications     Current Outpatient Medications   Medication Sig Dispense Refill   • escitalopram (LEXAPRO) 10 mg tablet Take 1 tablet (10 mg total) by mouth daily 90 tablet 3   • fexofenadine (ALLEGRA) 180 MG tablet Take 180 mg by mouth as needed       • lisdexamfetamine (Vyvanse) 40 MG capsule Take 1 capsule (40 mg total) by mouth every morning Max Daily Amount: 40 mg 90 capsule 0   • losartan (COZAAR) 100 MG tablet Take 1 tablet (100 mg total) by mouth daily 90 tablet 3   • Semaglutide-Weight Management (WEGOVY) 0 25 MG/0 5ML Inject 0 5 mL (0 25 mg total) under the skin once a week 2 mL 0   • VITAMIN D PO Take by mouth daily     • diclofenac (VOLTAREN) 75 mg EC tablet Take 1 tablet (75 mg total) by mouth 2 (two) times a day as needed (pain) (Patient not taking: Reported on 1/10/2023) 60 tablet 2     No current facility-administered medications for this visit           Health Maintenance     Health Maintenance   Topic Date Due   • COVID-19 Vaccine (4 - Booster for Pfizer series) 02/04/2022   • PT PLAN OF CARE  03/04/2023   • Annual Physical  09/19/2023   • BMI: Followup Plan  03/20/2024   • BMI: Adult  03/20/2024   • Depression Screening  04/03/2024   • DTaP,Tdap,and Td Vaccines (2 - Td or Tdap) 05/06/2027   • HIV Screening  Completed • Hepatitis C Screening  Completed   • Pneumococcal Vaccine: Pediatrics (0 to 5 Years) and At-Risk Patients (6 to 59 Years)  Completed   • Influenza Vaccine  Completed   • HIB Vaccine  Aged Out   • IPV Vaccine  Aged Out   • Hepatitis A Vaccine  Aged Out   • Meningococcal ACWY Vaccine  Aged Out   • HPV Vaccine  Aged Out     Immunization History   Administered Date(s) Administered   • COVID-19 PFIZER VACCINE 0 3 ML IM 03/28/2021, 04/18/2021, 12/10/2021   • Influenza Injectable, MDCK, Preservative Free, Quadrivalent, 0 5 mL 09/26/2019   • Influenza, recombinant, quadrivalent,injectable, preservative free 09/25/2020, 10/15/2021, 09/19/2022   • Influenza, seasonal, injectable 10/01/2017   • Pneumococcal Conjugate 13-Valent 09/10/2021   • Pneumococcal Conjugate Vaccine 20-valent (Pcv20), Polysace 09/19/2022   • Tdap 05/06/2017       Depression Screening and Follow-up Plan: Patient was screened for depression during today's encounter  They screened negative with a PHQ-2 score of 0          126 Naima Caban DO

## 2023-04-17 DIAGNOSIS — E66.01 MORBID OBESITY WITH BMI OF 40.0-44.9, ADULT (HCC): Primary | ICD-10-CM

## 2023-04-17 DIAGNOSIS — E66.01 MORBID OBESITY WITH BMI OF 40.0-44.9, ADULT (HCC): ICD-10-CM

## 2023-04-17 NOTE — TELEPHONE ENCOUNTER
Spoke to pt stated [facility] and to call with any type of side effects. Pt expressed understanding.

## 2023-04-17 NOTE — TELEPHONE ENCOUNTER
Pt called requested refill of medication. Sent to [facility]. Pt stated in the beginning of taking med he did get a bit of nausea however, it diminished.  Stated to pt when triturating up to higher dose may experience nausea, vomiting and or diarrhea. To please call office if experiencing nausea, vomiting and or diarrhea. Pt expressed understanding

## 2023-05-11 DIAGNOSIS — F90.0 ATTENTION DEFICIT HYPERACTIVITY DISORDER (ADHD), PREDOMINANTLY INATTENTIVE TYPE: ICD-10-CM

## 2023-05-16 ENCOUNTER — OFFICE VISIT (OUTPATIENT)
Dept: BARIATRICS | Facility: CLINIC | Age: 43
End: 2023-05-16

## 2023-05-16 VITALS
BODY MASS INDEX: 42.66 KG/M2 | WEIGHT: 315 LBS | HEIGHT: 72 IN | DIASTOLIC BLOOD PRESSURE: 78 MMHG | SYSTOLIC BLOOD PRESSURE: 138 MMHG | HEART RATE: 79 BPM

## 2023-05-16 DIAGNOSIS — E66.01 CLASS 3 SEVERE OBESITY DUE TO EXCESS CALORIES WITH SERIOUS COMORBIDITY AND BODY MASS INDEX (BMI) OF 40.0 TO 44.9 IN ADULT (HCC): ICD-10-CM

## 2023-05-16 DIAGNOSIS — E66.01 MORBID OBESITY WITH BMI OF 40.0-44.9, ADULT (HCC): Primary | ICD-10-CM

## 2023-05-16 NOTE — PROGRESS NOTES
Assessment/Plan:     Class 3 severe obesity due to excess calories with serious comorbidity and body mass index (BMI) of 40 0 to 44 9 in adult [facility])  - Patient is pursuing Conservative Program and follow up visits with medical weight management provider  - Initial weight loss goal of 5-10% weight loss for improved health  - Labs reviewed from 2022  -Patient lifestyle and nutrition changes were discussed today, patient was encouraged to continue with focusing on protein and monitor his portions  He was encouraged to continue with his water intake and to consider incorporating formal exercise into his schedule at least 2 days a week  To avoid side effects of Wegovy patient was encouraged to eat smaller more frequent meals and to avoid long periods without eating  Sipping on lemon water can also help if experiencing nausea on the [facility] FROILAN  Will advance Wegovy dose to 1 mg weekly and will monitor side effects as dose increases  Patient understands the side effects of the medication and proper administration  Patient agrees with the treatment plan and all questions were answered  Goals:  Do not skip meals  No sugary beverages  At least 64oz of water daily  Increase physical activity by 10 minutes daily  Gradually increase physical activity to a goal of 5 days per week for 30 minutes of MODERATE intensity PLUS 2 days per week of FULL BODY resistance training         Harini Hook was seen today for follow-up  Diagnoses and all orders for this visit:    Morbid obesity with BMI of 40 0-44 9, adult ([facility] [address] )  -     Semaglutide-Weight Management (WEGOVY) 1 MG/0 5ML;  Inject 0 5 mL (1 mg total) under the skin once a week    Class 3 severe obesity due to excess calories with serious comorbidity and body mass index (BMI) of 40 0 to 44 9 in adult [facility])        Total time spent reviewing chart, interviewing patient, examining patient, discussing plan, answering all questions, and documentin minutes with >50% face-to-face time with the patient  Follow up in approximately 6-8 weeks with Non-Surgical Physician/Advanced Practitioner  Subjective:   Chief Complaint   Patient presents with   • Follow-up     Pt is here for MWM f/u       Patient ID: Trisha Pedroza  is a 38 y o  male with excess weight/obesity here to pursue weight management  Patient is pursuing Conservative Program    Most recent notes and records were reviewed  HPI    Wt Readings from Last 20 Encounters:   05/16/23 (!) 147 kg (323 lb 9 6 oz)   04/03/23 (!) 150 kg (330 lb 12 8 oz)   03/20/23 (!) 147 kg (324 lb 12 8 oz)   01/10/23 (!) 147 kg (325 lb)   01/03/23 (!) 149 kg (329 lb)   11/18/22 (!) 149 kg (329 lb)   11/14/22 (!) 147 kg (325 lb)   11/08/22 (!) 147 kg (325 lb)   09/19/22 (!) 148 kg (325 lb 12 8 oz)   08/22/22 (!) 149 kg (327 lb 6 4 oz)   04/20/22 (!) 154 kg (340 lb)   03/07/22 (!) 149 kg (327 lb 6 4 oz)   02/07/22 101 kg (222 lb)   11/05/21 (!) 145 kg (319 lb)   09/10/21 (!) 143 kg (315 lb)   12/14/20 (!) 147 kg (325 lb)   10/19/20 (!) 145 kg (320 lb)   10/19/20 (!) 143 kg (316 lb)   10/07/20 (!) 146 kg (322 lb)   10/06/20 (!) 146 kg (322 lb)       Patient presents today to medical weight management office for follow up  Patient has been on [facility] for 7 weeks  He has some minimal side effects including some diarrhea and belching, he also experienced 1 day of nausea  Patient reports that the side effects are tolerable and infrequent and would like to continue with medication  Patient reports that his appetite has been decreasing and he has noticed improvement in the way his clothes are fitting and his home scale shows a 6 pound weight loss  Patient has been concentrating on smaller portions, incorporating more protein, and has been making healthier choices when he goes for takeout  He and his wife are trying to eat gluten-free dinners which consist mostly of protein and vegetables      Weight loss medication and dose: Wegovy 0 5mg  Started date and "weight: 324 lbs  Current weight: 323 8 lbs  Difference: -0 2 lbs    Diet recall:  B: coffee with banana, bagel with CC  L: less portions, healthier taking out choices  D: gluten free diet - protein and vegetables    Hydration: water - 4 bottles at least, rare soda and iced tea  Alcohol: very rare  Smoking: no  Exercise: , no formal exercise  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  Sleep: not well - 2 young kids, can't use CPAP every night due to kids        The following portions of the patient's history were reviewed and updated as appropriate: allergies, current medications, past family history, past medical history, past social history, past surgical history, and problem list     Family History   Problem Relation Age of Onset   • Hypertension Mother    • Diabetes Mother         pre-diabetic    • Heart attack Father    • Diabetes Father    • Hypertension Father    • Obesity Father         hx LRYGB   • Heart disease Father         hx MI age 36   • Arthritis Family    • Cancer Family    • Cancer Paternal Grandmother         hx bladder cancer   • Heart attack Paternal Grandfather    • No Known Problems Son    • Stroke Neg Hx    • Thyroid disease Neg Hx         Review of Systems   Constitutional: Negative for fatigue  HENT: Negative for sore throat  Respiratory: Negative for cough and shortness of breath  Cardiovascular: Negative for chest pain, palpitations and leg swelling  Gastrointestinal: Negative for abdominal pain, constipation, diarrhea and nausea  Genitourinary: Negative for dysuria  Musculoskeletal: Negative for arthralgias and back pain  Skin: Negative for rash  Neurological: Negative for headaches  Psychiatric/Behavioral: Negative for dysphoric mood  The patient is not nervous/anxious  Objective:  /78   Pulse 79   Ht 6' 0 2\" (1 834 m)   Wt (!) 147 kg (323 lb 9 6 oz)   BMI 43 65 kg/m²     Physical Exam  Vitals and nursing note reviewed     Constitutional:       " Appearance: Normal appearance  He is obese  HENT:      Head: Normocephalic  Pulmonary:      Effort: Pulmonary effort is normal    Neurological:      General: No focal deficit present  Mental Status: He is alert and oriented to person, place, and time  Psychiatric:         Mood and Affect: Mood normal          Behavior: Behavior normal          Thought Content:  Thought content normal          Judgment: Judgment normal             Labs   Most recent labs reviewed   Lab Results   Component Value Date    SODIUM 135 12/05/2022    K 3 8 12/05/2022     12/05/2022    CO2 29 12/05/2022    AGAP 3 (L) 12/05/2022    BUN 16 12/05/2022    CREATININE 0 78 12/05/2022    GLUC 102 10/09/2020    GLUF 97 12/05/2022    CALCIUM 9 0 12/05/2022    AST 31 12/05/2022    ALT 51 12/05/2022    ALKPHOS 88 12/05/2022    TP 7 3 12/05/2022    TBILI 0 49 12/05/2022    EGFR 112 12/05/2022     Lab Results   Component Value Date    HGBA1C 5 5 12/05/2022     Lab Results   Component Value Date    [id number] 2 160 07/07/2020     Lab Results   Component Value Date    CHOLESTEROL 138 12/05/2022     Lab Results   Component Value Date    HDL 31 (L) 12/05/2022     Lab Results   Component Value Date    TRIG 150 12/05/2022     Lab Results   Component Value Date    LDLCALC 77 12/05/2022

## 2023-05-16 NOTE — ASSESSMENT & PLAN NOTE
- Patient is pursuing Conservative Program and follow up visits with medical weight management provider  - Initial weight loss goal of 5-10% weight loss for improved health  - Labs reviewed from 12/2022  -Patient lifestyle and nutrition changes were discussed today, patient was encouraged to continue with focusing on protein and monitor his portions  He was encouraged to continue with his water intake and to consider incorporating formal exercise into his schedule at least 2 days a week  To avoid side effects of Wegovy patient was encouraged to eat smaller more frequent meals and to avoid long periods without eating  Sipping on lemon water can also help if experiencing nausea on the [facility] FROILAN  Will advance Wegovy dose to 1 mg weekly and will monitor side effects as dose increases  Patient understands the side effects of the medication and proper administration  Patient agrees with the treatment plan and all questions were answered  Goals:  Do not skip meals  No sugary beverages  At least 64oz of water daily  Increase physical activity by 10 minutes daily   Gradually increase physical activity to a goal of 5 days per week for 30 minutes of MODERATE intensity PLUS 2 days per week of FULL BODY resistance training

## 2023-05-30 ENCOUNTER — TELEPHONE (OUTPATIENT)
Dept: BARIATRICS | Facility: CLINIC | Age: 43
End: 2023-05-30

## 2023-05-30 DIAGNOSIS — E66.01 CLASS 3 SEVERE OBESITY DUE TO EXCESS CALORIES WITH SERIOUS COMORBIDITY AND BODY MASS INDEX (BMI) OF 40.0 TO 44.9 IN ADULT (HCC): Primary | ICD-10-CM

## 2023-05-30 NOTE — TELEPHONE ENCOUNTER
April 1, 2021     Patient: Princess Solis   YOB: 1987   Date of Visit: 4/1/2021       To Whom it May Concern:    Donnie Lopezgraciela is under my professional care  He was seen in my office on 4/1/2021  He may return to work on 4/6/2021  If you have any questions or concerns, please don't hesitate to call           Sincerely,          JOSE Matthew        CC: No Recipients Pt calling today to request his next Wegovy dose which is 1 7mg  He has taken 2 doses of the 1mg dose and feels that his GI symptoms have greatly improved  We discussed that with every increase in dose he can get the side effects again and if he does he should reach out to us here at the office for guidance  Patient verbalized understanding

## 2023-06-06 ENCOUNTER — OFFICE VISIT (OUTPATIENT)
Dept: BARIATRICS | Facility: CLINIC | Age: 43
End: 2023-06-06
Payer: COMMERCIAL

## 2023-06-06 VITALS
HEIGHT: 72 IN | WEIGHT: 315 LBS | HEART RATE: 74 BPM | BODY MASS INDEX: 42.66 KG/M2 | SYSTOLIC BLOOD PRESSURE: 124 MMHG | DIASTOLIC BLOOD PRESSURE: 87 MMHG

## 2023-06-06 DIAGNOSIS — E66.01 CLASS 3 SEVERE OBESITY DUE TO EXCESS CALORIES WITH SERIOUS COMORBIDITY AND BODY MASS INDEX (BMI) OF 40.0 TO 44.9 IN ADULT (HCC): Primary | ICD-10-CM

## 2023-06-06 PROCEDURE — 99214 OFFICE O/P EST MOD 30 MIN: CPT | Performed by: NURSE PRACTITIONER

## 2023-06-06 NOTE — ASSESSMENT & PLAN NOTE
- Patient is pursuing Conservative Program and follow up visits with medical weight management provider  - Initial weight loss goal of 5-10% weight loss for improved health  -Patient is doing on Wegovy 1 mg, and will be increasing to 1 7 mg with his next dose  He is tolerating medication without any side effects, his nausea has resolved since increasing his water intake and eating smaller more frequent meals  He has adjusted his takeout food choices to better support his weight loss goals  He was congratulated on his weight loss today  We will continue to titrate the [facility] FROILAN as patient tolerates  Goals:  Do not skip meals  Food log via kerrie - options include  www  myfitnesspal com, sparkpeople  com, loseit com, calorieking  com, brick&mobile  No sugary beverages  At least 64oz of water daily  Increase physical activity by 10 minutes daily   Gradually increase physical activity to a goal of 5 days per week for 30 minutes of MODERATE intensity PLUS 2 days per week of FULL BODY resistance training

## 2023-06-06 NOTE — PROGRESS NOTES
Assessment/Plan:     Class 3 severe obesity due to excess calories with serious comorbidity and body mass index (BMI) of 40 0 to 44 9 in [facility])  - Patient is pursuing Conservative Program and follow up visits with medical weight management provider  - Initial weight loss goal of 5-10% weight loss for improved health  -Patient is doing on Wegovy 1 mg, and will be increasing to 1 7 mg with his next dose  He is tolerating medication without any side effects, his nausea has resolved since increasing his water intake and eating smaller more frequent meals  He has adjusted his takeout food choices to better support his weight loss goals  He was congratulated on his weight loss today  We will continue to titrate the [facility]FORT FROILAN as patient tolerates  Goals:  Do not skip meals  Food log via kerrie - options include  www  myfitnesspal com, sparkpeople  com, loseit com, calorieking  com, [employer]  No sugary beverages  At least 64oz of water daily  Increase physical activity by 10 minutes daily  Gradually increase physical activity to a goal of 5 days per week for 30 minutes of MODERATE intensity PLUS 2 days per week of FULL BODY resistance training         Meron Baum was seen today for follow-up  Diagnoses and all orders for this visit:    Class 3 severe obesity due to excess calories with serious comorbidity and body mass index (BMI) of 40 0 to 44 9 in [facility])        Total time spent reviewing chart, interviewing patient, examining patient, discussing plan, answering all questions, and documentin minutes with >50% face-to-face time with the patient  Follow up in approximately 2 months with Non-Surgical Physician/Advanced Practitioner  Subjective:   Chief Complaint   Patient presents with   • Follow-up     Pt is here for MWM f/u  Patient ID: Gaby Prasad  is a 38 y o  male with excess weight/obesity here to pursue weight management    Patient is pursuing Conservative Program    Most recent notes and records were reviewed  HPI    Wt Readings from Last 20 Encounters:   06/06/23 (!) 144 kg (316 lb 12 8 oz)   05/16/23 (!) 147 kg (323 lb 9 6 oz)   04/03/23 (!) 150 kg (330 lb 12 8 oz)   03/20/23 (!) 147 kg (324 lb 12 8 oz)   01/10/23 (!) 147 kg (325 lb)   01/03/23 (!) 149 kg (329 lb)   11/18/22 (!) 149 kg (329 lb)   11/14/22 (!) 147 kg (325 lb)   11/08/22 (!) 147 kg (325 lb)   09/19/22 (!) 148 kg (325 lb 12 8 oz)   08/22/22 (!) 149 kg (327 lb 6 4 oz)   04/20/22 (!) 154 kg (340 lb)   03/07/22 (!) 149 kg (327 lb 6 4 oz)   02/07/22 101 kg (222 lb)   11/05/21 (!) 145 kg (319 lb)   09/10/21 (!) 143 kg (315 lb)   12/14/20 (!) 147 kg (325 lb)   10/19/20 (!) 145 kg (320 lb)   10/19/20 (!) 143 kg (316 lb)   10/07/20 (!) 146 kg (322 lb)       Patient presents today to medical weight management office for follow up  Patient is doing well on the [facility]FORT FROILAN 1mg  He has noticed he is eating smaller, more frequent meals and this has not only helped his weight loss but also his nausea  He denies any side effects since changing his eating  He has cut back on carbohydrates and his portions  He has changed his takeout choices when he is at work for salads and oatmeal   Patient is very pleased with his progress and would like to continue this medication      Weight loss medication and dose: Wegovy 1 7mg  Started date and weight: 324 lbs  Current weight: 316 8 lbs (last  8 lbs)  Difference: -7 2 lbs    Diet recall:  B: steel cut oats with banana  L: less portions, healthier taking out choices (salad with grilled chicken)  D: gluten free diet - protein and vegetables    Hydration: 1 coffee with cream, water - 4 bottles at least, rare soda and iced tea  Alcohol: very rare  Smoking: no  Exercise: , no formal exercise  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  Sleep: not well - 2 young kids, can't use CPAP every night due to kids        The following portions of the patient's history were reviewed and updated as appropriate: "allergies, current medications, past family history, past medical history, past social history, past surgical history, and problem list     Family History   Problem Relation Age of Onset   • Hypertension Mother    • Diabetes Mother         pre-diabetic    • Heart attack Father    • Diabetes Father    • Hypertension Father    • Obesity Father         hx LRYGB   • Heart disease Father         hx MI age 36   • Arthritis Family    • Cancer Family    • Cancer Paternal Grandmother         hx bladder cancer   • Heart attack Paternal Grandfather    • No Known Problems Son    • Stroke Neg Hx    • Thyroid disease Neg Hx         Review of Systems   Constitutional: Negative for fatigue  HENT: Negative for sore throat  Respiratory: Negative for cough and shortness of breath  Cardiovascular: Negative for chest pain, palpitations and leg swelling  Gastrointestinal: Negative for abdominal pain, constipation, diarrhea and nausea  Genitourinary: Negative for dysuria  Musculoskeletal: Negative for arthralgias and back pain  Skin: Negative for rash  Neurological: Negative for headaches  Psychiatric/Behavioral: Negative for dysphoric mood  The patient is not nervous/anxious  Objective:  /87   Pulse 74   Ht 6' 0 2\" (1 834 m)   Wt (!) 144 kg (316 lb 12 8 oz)   BMI 42 73 kg/m²     Physical Exam  Vitals and nursing note reviewed  Constitutional:       Appearance: Normal appearance  He is obese  HENT:      Head: Normocephalic  Pulmonary:      Effort: Pulmonary effort is normal    Neurological:      General: No focal deficit present  Mental Status: He is alert and oriented to person, place, and time  Psychiatric:         Mood and Affect: Mood normal          Behavior: Behavior normal          Thought Content:  Thought content normal          Judgment: Judgment normal             Labs   Most recent labs reviewed   Lab Results   Component Value Date    AGAP 3 (L) 12/05/2022    ALKPHOS 88 " 12/05/2022    ALT 51 12/05/2022    AST 31 12/05/2022    BUN 16 12/05/2022    CALCIUM 9 0 12/05/2022     12/05/2022    CO2 29 12/05/2022    CREATININE 0 78 12/05/2022    EGFR 112 12/05/2022    GLUC 102 10/09/2020    GLUF 97 12/05/2022    K 3 8 12/05/2022    SODIUM 135 12/05/2022    TBILI 0 49 12/05/2022    TP 7 3 12/05/2022     Lab Results   Component Value Date    HGBA1C 5 5 12/05/2022     Lab Results   Component Value Date    DOO3WMGWVLSD 2 160 07/07/2020     Lab Results   Component Value Date    CHOLESTEROL 138 12/05/2022     Lab Results   Component Value Date    HDL 31 (L) 12/05/2022     Lab Results   Component Value Date    TRIG 150 12/05/2022     Lab Results   Component Value Date    LDLCALC 77 12/05/2022

## 2023-07-10 DIAGNOSIS — E66.01 CLASS 3 SEVERE OBESITY DUE TO EXCESS CALORIES WITH SERIOUS COMORBIDITY AND BODY MASS INDEX (BMI) OF 40.0 TO 44.9 IN ADULT (HCC): Primary | ICD-10-CM

## 2023-07-10 NOTE — TELEPHONE ENCOUNTER
Patient calling today to request next dose of Wegovy. He has one dose of the 1.7mg remaining. He did say that he was having some mild GI side effects but nothing that he is unable to tolerate. I did advise him that with the increase in dose that side effects may worsen. Patient verbalized understanding and would like to proceed with the higher dose.

## 2023-07-11 NOTE — TELEPHONE ENCOUNTER
Spoke with patient and informed him that 2.4mg dose was sent to his pharmacy as requested. Asked that if his GI symptoms should worsen that he calls us for further instruction. Patient verbalized understanding.

## 2023-07-19 ENCOUNTER — TELEPHONE (OUTPATIENT)
Dept: DERMATOLOGY | Facility: CLINIC | Age: 43
End: 2023-07-19

## 2023-07-28 DIAGNOSIS — F90.0 ATTENTION DEFICIT HYPERACTIVITY DISORDER (ADHD), PREDOMINANTLY INATTENTIVE TYPE: ICD-10-CM

## 2023-08-07 DIAGNOSIS — E66.01 CLASS 3 SEVERE OBESITY DUE TO EXCESS CALORIES WITH SERIOUS COMORBIDITY AND BODY MASS INDEX (BMI) OF 40.0 TO 44.9 IN ADULT (HCC): ICD-10-CM

## 2023-08-07 NOTE — TELEPHONE ENCOUNTER
Pt called requested refill of Wegovy sent to [facility]. Pt is presently on 2.4 mg and is experiencing but able to tolerate upset stomach and diarrhea for 8 hrs then subsides. Thank you.

## 2023-08-08 ENCOUNTER — APPOINTMENT (OUTPATIENT)
Dept: LAB | Facility: HOSPITAL | Age: 43
End: 2023-08-08
Payer: COMMERCIAL

## 2023-08-08 ENCOUNTER — OFFICE VISIT (OUTPATIENT)
Dept: BARIATRICS | Facility: CLINIC | Age: 43
End: 2023-08-08
Payer: COMMERCIAL

## 2023-08-08 VITALS
DIASTOLIC BLOOD PRESSURE: 70 MMHG | SYSTOLIC BLOOD PRESSURE: 124 MMHG | HEIGHT: 72 IN | WEIGHT: 306.8 LBS | HEART RATE: 73 BPM | BODY MASS INDEX: 41.55 KG/M2

## 2023-08-08 DIAGNOSIS — E66.01 CLASS 3 SEVERE OBESITY DUE TO EXCESS CALORIES WITH SERIOUS COMORBIDITY AND BODY MASS INDEX (BMI) OF 40.0 TO 44.9 IN ADULT (HCC): Primary | ICD-10-CM

## 2023-08-08 DIAGNOSIS — Z00.8 HEALTH EXAMINATION IN POPULATION SURVEY: ICD-10-CM

## 2023-08-08 LAB
CHOLEST SERPL-MCNC: 135 MG/DL
HDLC SERPL-MCNC: 31 MG/DL
LDLC SERPL CALC-MCNC: 72 MG/DL (ref 0–100)
NONHDLC SERPL-MCNC: 104 MG/DL
TRIGL SERPL-MCNC: 159 MG/DL

## 2023-08-08 PROCEDURE — 80061 LIPID PANEL: CPT

## 2023-08-08 PROCEDURE — 99214 OFFICE O/P EST MOD 30 MIN: CPT | Performed by: NURSE PRACTITIONER

## 2023-08-08 PROCEDURE — 83036 HEMOGLOBIN GLYCOSYLATED A1C: CPT

## 2023-08-08 PROCEDURE — 36415 COLL VENOUS BLD VENIPUNCTURE: CPT

## 2023-08-08 NOTE — PROGRESS NOTES
Assessment/Plan:     Class 3 severe obesity due to excess calories with serious comorbidity and body mass index (BMI) of 40.0 to 44.9 in [facility])  - Patient is pursuing Conservative Program and follow up visits with medical weight management provider  - Initial weight loss goal of 5-10% weight loss for improved health  - Patient will continue with [facility]FORT FROILAN 2.4mg weekly. He will call if diarrhea returns. He was encouraged to continue with portion control and focusing on protein intake. He was advised to start to incorporate 2 small snacks during the day to make sure he is getting enough calories in during the day. He was also encouraged to start an exercise routine at least 20 minutes 2-3 times a week. Goals:  Do not skip meals. Food log via kerrie - options include  www. [employer].com, sparkpeople. com, loseit.com, calorieking. com, [employer]  No sugary beverages. At least 64oz of water daily. Increase physical activity by 10 minutes daily. Gradually increase physical activity to a goal of 5 days per week for 30 minutes of MODERATE intensity PLUS 2 days per week of FULL BODY resistance training         Arjun Godinez was seen today for follow-up. Diagnoses and all orders for this visit:    Class 3 severe obesity due to excess calories with serious comorbidity and body mass index (BMI) of 40.0 to 44.9 in [facility])        Total time spent reviewing chart, interviewing patient, examining patient, discussing plan, answering all questions, and documentin minutes with >50% face-to-face time with the patient. Follow up in approximately 2 months with Non-Surgical Physician/Advanced Practitioner. Subjective:   Chief Complaint   Patient presents with   • Follow-up     Pt is here for MWM f/u       Patient ID: Denis Conrad  is a 38 y.o. male with excess weight/obesity here to pursue weight management. Patient is pursuing Conservative Program.   Most recent notes and records were reviewed.     HPI    Wt Readings from Last 20 Encounters:   08/08/23 (!) 139 kg (306 lb 12.8 oz)   06/06/23 (!) 144 kg (316 lb 12.8 oz)   05/16/23 (!) 147 kg (323 lb 9.6 oz)   04/03/23 (!) 150 kg (330 lb 12.8 oz)   03/20/23 (!) 147 kg (324 lb 12.8 oz)   01/10/23 (!) 147 kg (325 lb)   01/03/23 (!) 149 kg (329 lb)   11/18/22 (!) 149 kg (329 lb)   11/14/22 (!) 147 kg (325 lb)   11/08/22 (!) 147 kg (325 lb)   09/19/22 (!) 148 kg (325 lb 12.8 oz)   08/22/22 (!) 149 kg (327 lb 6.4 oz)   04/20/22 (!) 154 kg (340 lb)   03/07/22 (!) 149 kg (327 lb 6.4 oz)   02/07/22 101 kg (222 lb)   11/05/21 (!) 145 kg (319 lb)   09/10/21 (!) 143 kg (315 lb)   12/14/20 (!) 147 kg (325 lb)   10/19/20 (!) 145 kg (320 lb)   10/19/20 (!) 143 kg (316 lb)       Patient presents today to medical weight management office for follow up. Patient has been on [facility]FORT FROILAN 2.4mg and is now tolerating well. He had some diarrhea at the beginning of the high dose but after his 4th dose he states it has improved. He continues to work on his nutrition - is focusing on more protein and vegetables. He finds since starting [facility]FORT FROILAN he doesn't tolerate sugars or breads. He has not started an exercise routine but plans to start once - may start biking. Patient is very pleased with his progress and would like to continue this medication.     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    Family History   Problem Relation Age of Onset   • Hypertension Mother    • Diabetes Mother         pre-diabetic    • Heart attack Father    • Diabetes Father    • Hypertension Father    • Obesity Father         hx LRYGB   • Heart disease Father         hx MI age 36   • Arthritis Family    • Cancer Family    • Cancer Paternal Grandmother         hx bladder cancer   • Heart attack Paternal Grandfather    • No Known Problems Son    • Stroke Neg Hx    • Thyroid disease Neg Hx         Review of Systems   Constitutional: Negative for fatigue. HENT: Negative for sore throat. Respiratory: Negative for cough and shortness of breath. Cardiovascular: Negative for chest pain, palpitations and leg swelling. Gastrointestinal: Positive for diarrhea (occasional). Negative for abdominal pain, constipation and nausea. Genitourinary: Negative for dysuria. Musculoskeletal: Negative for arthralgias and back pain. Skin: Negative for rash. Neurological: Negative for headaches. Psychiatric/Behavioral: Negative for dysphoric mood. The patient is not nervous/anxious. Objective:  /70   Pulse 73   Ht 6' 0.2" (1.834 m)   Wt (!) 139 kg (306 lb 12.8 oz)   BMI 41.38 kg/m²     Physical Exam  Vitals and nursing note reviewed. Constitutional:       Appearance: Normal appearance. He is obese. HENT:      Head: Normocephalic. Pulmonary:      Effort: Pulmonary effort is normal.   Neurological:      General: No focal deficit present. Mental Status: He is alert and oriented to person, place, and time. Psychiatric:         Mood and Affect: Mood normal.         Behavior: Behavior normal.         Thought Content:  Thought content normal.         Judgment: Judgment normal.            Labs   Most recent labs reviewed   Lab Results   Component Value Date    SODIUM 135 12/05/2022    K 3.8 12/05/2022     12/05/2022    CO2 29 12/05/2022    AGAP 3 (L) 12/05/2022    BUN 16 12/05/2022    CREATININE 0.78 12/05/2022    GLUC 102 10/09/2020    GLUF 97 12/05/2022    CALCIUM 9.0 12/05/2022    AST 31 12/05/2022    ALT 51 12/05/2022    ALKPHOS 88 12/05/2022    TP 7.3 12/05/2022    TBILI 0.49 12/05/2022    EGFR 112 12/05/2022     Lab Results   Component Value Date    HGBA1C 5.5 12/05/2022     Lab Results   Component Value Date    WLH5VMRLXXKC 2.160 07/07/2020     Lab Results   Component Value Date    CHOLESTEROL 138 12/05/2022     Lab Results   Component Value Date    HDL 31 (L) 12/05/2022     Lab Results   Component Value Date    TRIG 150 12/05/2022     Lab Results   Component Value Date    LDLCALC 77 12/05/2022

## 2023-08-08 NOTE — ASSESSMENT & PLAN NOTE
- Patient is pursuing Conservative Program and follow up visits with medical weight management provider  - Initial weight loss goal of 5-10% weight loss for improved health  - Patient will continue with [facility]FORT FROILAN 2.4mg weekly. He will call if diarrhea returns. He was encouraged to continue with portion control and focusing on protein intake. He was advised to start to incorporate 2 small snacks during the day to make sure he is getting enough calories in during the day. He was also encouraged to start an exercise routine at least 20 minutes 2-3 times a week. Goals:  Do not skip meals. Food log via kerrie - options include  www. [employer].com, sparkpeople. com, [employer]Parioit.com, calorieking. com, [employer]  No sugary beverages. At least 64oz of water daily. Increase physical activity by 10 minutes daily.  Gradually increase physical activity to a goal of 5 days per week for 30 minutes of MODERATE intensity PLUS 2 days per week of FULL BODY resistance training

## 2023-08-09 LAB
EST. AVERAGE GLUCOSE BLD GHB EST-MCNC: 117 MG/DL
HBA1C MFR BLD: 5.7 %

## 2023-09-07 DIAGNOSIS — E66.01 CLASS 3 SEVERE OBESITY DUE TO EXCESS CALORIES WITH SERIOUS COMORBIDITY AND BODY MASS INDEX (BMI) OF 40.0 TO 44.9 IN ADULT (HCC): Primary | ICD-10-CM

## 2023-09-11 ENCOUNTER — TELEPHONE (OUTPATIENT)
Dept: BARIATRICS | Facility: CLINIC | Age: 43
End: 2023-09-11

## 2023-09-11 DIAGNOSIS — E66.01 CLASS 3 SEVERE OBESITY DUE TO EXCESS CALORIES WITH SERIOUS COMORBIDITY AND BODY MASS INDEX (BMI) OF 40.0 TO 44.9 IN ADULT (HCC): Primary | ICD-10-CM

## 2023-09-11 NOTE — TELEPHONE ENCOUNTER
Patient called last week and requested 1.7mg dose. He has been using the 2.4mg dose and requested the wrong dose. He has spoken to the pharmacy and they are willing to allow him to return the 1.7mg and process the 2.4mg script for him. Can you please send an order in for the 2.4mg dose? Thank you!

## 2023-09-27 ENCOUNTER — TELEPHONE (OUTPATIENT)
Dept: PULMONOLOGY | Facility: CLINIC | Age: 43
End: 2023-09-27

## 2023-09-27 NOTE — TELEPHONE ENCOUNTER
I called and left a message for Byron Liu to call me back so I can set him up with a follow up to see Dr. Cheri Perales.   Please transfer call to me when they call back. Thank you.

## 2023-10-02 ENCOUNTER — TELEPHONE (OUTPATIENT)
Dept: BARIATRICS | Facility: CLINIC | Age: 43
End: 2023-10-02

## 2023-10-02 DIAGNOSIS — E66.01 CLASS 3 SEVERE OBESITY DUE TO EXCESS CALORIES WITH SERIOUS COMORBIDITY AND BODY MASS INDEX (BMI) OF 40.0 TO 44.9 IN ADULT (HCC): Primary | ICD-10-CM

## 2023-10-02 DIAGNOSIS — E66.01 CLASS 3 SEVERE OBESITY DUE TO EXCESS CALORIES WITH SERIOUS COMORBIDITY AND BODY MASS INDEX (BMI) OF 40.0 TO 44.9 IN ADULT (HCC): ICD-10-CM

## 2023-10-02 NOTE — TELEPHONE ENCOUNTER
If he continues with the diarrhea on the higher dose I will lower it back down to 1.7 mg. But as he adjusts to the medication it may improve. If he has taken 2 months and continues to have diarrhea he will need to go back down a dose.

## 2023-10-02 NOTE — TELEPHONE ENCOUNTER
Spoke with patient and he feels like he has had these symptoms since starting the 2.4mg dose and they are seeming to get progressively worse instead of better. Patient would like to have the 1.7mg dose prescribed. Please advise.

## 2023-10-02 NOTE — TELEPHONE ENCOUNTER
Pt is calling this AM in regard to side effects on Wegovy 2.4mg dose. He has had diarrhea 3+ times a day as well as some gas. I did advise patient that he could get some OTC Imodium AD and see if that helps his symptoms. He proceeded to use his last 2.4mg pen this AM. I told him that I would send you the refill request and that you would most likely prescribe him the 1.7mg dose to see if he has any improvement on the lower dose. Encouraged patient to be sure to get in plenty of fluids while managing his symptoms.

## 2023-10-03 ENCOUNTER — OFFICE VISIT (OUTPATIENT)
Dept: FAMILY MEDICINE CLINIC | Facility: CLINIC | Age: 43
End: 2023-10-03
Payer: COMMERCIAL

## 2023-10-03 VITALS
HEIGHT: 72 IN | OXYGEN SATURATION: 98 % | DIASTOLIC BLOOD PRESSURE: 84 MMHG | WEIGHT: 305.4 LBS | SYSTOLIC BLOOD PRESSURE: 130 MMHG | TEMPERATURE: 97 F | HEART RATE: 88 BPM | BODY MASS INDEX: 41.37 KG/M2 | RESPIRATION RATE: 16 BRPM

## 2023-10-03 DIAGNOSIS — I10 PRIMARY HYPERTENSION: ICD-10-CM

## 2023-10-03 DIAGNOSIS — E66.01 CLASS 3 SEVERE OBESITY DUE TO EXCESS CALORIES WITH SERIOUS COMORBIDITY AND BODY MASS INDEX (BMI) OF 40.0 TO 44.9 IN ADULT (HCC): ICD-10-CM

## 2023-10-03 DIAGNOSIS — E55.9 VITAMIN D DEFICIENCY: ICD-10-CM

## 2023-10-03 DIAGNOSIS — R73.01 IMPAIRED FASTING GLUCOSE: ICD-10-CM

## 2023-10-03 DIAGNOSIS — F90.0 ATTENTION DEFICIT HYPERACTIVITY DISORDER (ADHD), PREDOMINANTLY INATTENTIVE TYPE: ICD-10-CM

## 2023-10-03 DIAGNOSIS — Z00.00 ANNUAL PHYSICAL EXAM: Primary | ICD-10-CM

## 2023-10-03 PROCEDURE — 99396 PREV VISIT EST AGE 40-64: CPT | Performed by: FAMILY MEDICINE

## 2023-10-03 NOTE — ASSESSMENT & PLAN NOTE
Blood Pressure: 130/84    -Stable in office today.  -Continue losartan 100 mg p.o. daily  -Continue dietary modifications -low-salt, low-carb, low sugar, whole food based eating.  -Discussed exercise recommendations -working toward 30 minutes of moderate intensity exercise 5 days a week.

## 2023-10-03 NOTE — ASSESSMENT & PLAN NOTE
- Following with weight management team.  -Patient currently on Wegovy -having episodes of diarrhea at the 2.4 mg weekly. We will return back to 1.7 mg weekly. -Encouraged adequate hydration and addition of electrolytes with some hamstring cramping.  -Encouraged exercise as above.

## 2023-10-03 NOTE — ASSESSMENT & PLAN NOTE
Lab Results   Component Value Date    HGBA1C 5.7 (H) 08/08/2023     -Mildly elevated from last 5.5.  -Currently on semaglutide. Has been on for about 3 to 4 months.  -Has lost about 20 pounds at this point. Making definite changes in eating habits as well. -We will continue to monitor repeat A1c in 6 months to a year.

## 2023-10-17 ENCOUNTER — OFFICE VISIT (OUTPATIENT)
Age: 43
End: 2023-10-17
Payer: COMMERCIAL

## 2023-10-17 VITALS
WEIGHT: 305 LBS | HEART RATE: 82 BPM | BODY MASS INDEX: 41.31 KG/M2 | HEIGHT: 72 IN | OXYGEN SATURATION: 97 % | SYSTOLIC BLOOD PRESSURE: 132 MMHG | DIASTOLIC BLOOD PRESSURE: 72 MMHG

## 2023-10-17 DIAGNOSIS — G47.33 OSA (OBSTRUCTIVE SLEEP APNEA): Primary | ICD-10-CM

## 2023-10-17 PROCEDURE — 99214 OFFICE O/P EST MOD 30 MIN: CPT | Performed by: INTERNAL MEDICINE

## 2023-10-17 NOTE — LETTER
October 17, 2023     Sabina Deleon, [address] [address]    Patient: Juanito Lim   YOB: 1980   Date of Visit: 10/17/2023       Dear Dr. Ronny Mondragon: Thank you for referring Juanito Lim to me for evaluation. Below are my notes for this consultation. If you have questions, please do not hesitate to call me. I look forward to following your patient along with you. Sincerely,        Abbie Trinh DO        CC: No Recipients    Abbie Trinh DO  10/17/2023  9:05 PM  Sign when Signing Visit  Progress Note - Sleep Medicine  Juanito Lim 38 y.o. male MRN: [id number]       Impression & Plan:   39 y/o M with PMHx of JAMIE on autoPAP, HTN and ADHD on Vyvanse who comes in for follow up of JAMIE. 1.   JAMIE previously on autoCPAP 7-15 but had difficulty with tolerance. Now on autoBIPAP with clinical benefit but with suboptimal compliance. Residual AHI - 1.4       -  We discussed the importance of being consistent with his BIPAP. He explained that it has been hard as he has been sleeping with his son. I again explained the importance of using the device       -  Continue autoBIPAP with min EPAP of 8, PSV of 4, IPAP 25. Follow compliance in 6 months. - An order for supplies was placed. Currently using full face mask. 2.  Excessive daytime sleepiness despite CPAP. Hx of ADHD      -  Management of JAMIE as below      -  Continue Vyvanse 40mg PO Daily for ADHD. This has also been helpful to help his daytime sleepiness. 3.  Weight loss - he has lost weight with wegovy. Continue to follow with nutrition. ______________________________________________________________________    HPI:    Juanito Lim presents today for follow-up for his JAMIE. He states that he has been having difficulty maintaining consistency with his PAP.   He states that he is often sleeping with his son and as a result he does not use the device often. He states that he is not noticing a significant improvement while using the device. He does states that he has lost weight while on Wegovy. Review of Systems:  Review of Systems   Constitutional:  Positive for fatigue. Negative for appetite change. Respiratory:  Positive for apnea. Negative for shortness of breath. Cardiovascular: Negative. Gastrointestinal: Negative. Endocrine: Negative. Genitourinary: Negative. Musculoskeletal: Negative. Skin: Negative. Allergic/Immunologic: Negative. Neurological: Negative. Hematological: Negative.           Social history updates:  Social History     Tobacco Use   Smoking Status Never   Smokeless Tobacco Never     Social History     Socioeconomic History   • Marital status: /Civil Union     Spouse name: jeronimo    • Number of children: 1   • Years of education: Not on file   • Highest education level: Not on file   Occupational History   • Not on file   Tobacco Use   • Smoking status: Never   • Smokeless tobacco: Never   Vaping Use   • Vaping Use: Never used   Substance and Sexual Activity   • Alcohol use: Yes     Comment: rare   • Drug use: No   • Sexual activity: Yes     Partners: Female   Other Topics Concern   • Not on file   Social History Narrative    Who lives in your home: Wife and son     What type of home do you live in: Single house    Age of your home: 1996     How long have you been living there: 2016    Type of heat: Forced hot air    Type of fuel: Oil    What type of kaur is in your bedroom: Hardwood floor    Do you have the following in or near your home:    [employer]: Central air, Air , [facility] and [address] [place] [address] Drive: None    Pets: Dogs ([place]  and [place])     Are pets allowed in bedroom: Yes    Open fields, wooded areas nearby: Open fields and Wooded areas    Basement: Damp and Unfinished    Exposure to second hand smoke: No        Habits:    Caffeine: coffee 1 cup daily-soda and ice tea minimally- hot tea when he's sick     Chocolate: 1x a month     Other:     Social Determinants of Health     Financial Resource Strain: Not on file   Food Insecurity: Not on file   Transportation Needs: Not on file   Physical Activity: Inactive (10/19/2020)    Exercise Vital Sign    • Days of Exercise per Week: 0 days    • Minutes of Exercise per Session: 0 min   Stress: Not on file   Social Connections: Not on file   Intimate Partner Violence: Not on file   Housing Stability: Not on file       PhysicalExamination:  Vitals:   /72 (BP Location: Left arm, Patient Position: Sitting, Cuff Size: Large)   Pulse 82   Ht 5' 11.81" (1.824 m)   Wt (!) 138 kg (305 lb)   SpO2 97%   BMI 41.58 kg/m²   General: Pleasant, Awake alert and oriented x 3, conversant without conversational dyspnea, NAD, normal affect  HEENT:  PERRL, Sclera noninjected, nonicteric OU, Nares patent,  no craniofacial abnormalities, Mucous membranes, moist, no oral lesions, normal dentition, Mallampati class 3  NECK: Trachea midline, no accessory muscle use, no stridor, no cervical or supraclavicular adenopathy, JVP not elevated  CARDIAC: Reg, single s1/S2, no m/r/g  PULM: CTA bilaterally no wheezing, rhonchi or rales  ABD: Normoactive bowel sounds, soft nontender, nondistended, no rebound, no rigidity, no guarding  EXT: No cyanosis, no clubbing, no edema, normal capillary refill  NEURO: no focal neurologic deficits, AAOx3, moving all extremities appropriately      Diagnostic Data:  Labs: I personally reviewed the most recent laboratory data pertinent to today's visit  not applicable    I have personally reviewed pertinent lab results.   Lab Results   Component Value Date    WBC 6.22 11/07/2020    HGB 14.0 11/07/2020    HCT 43.4 11/07/2020    MCV 86 11/07/2020     11/07/2020     Lab Results   Component Value Date    GLUCOSE 122 02/10/2016    CALCIUM 9.0 12/05/2022    K 3.8 12/05/2022    CO2 29 12/05/2022     12/05/2022    BUN 16 12/05/2022    CREATININE 0.78 12/05/2022     No results found for: "IGE"  Lab Results   Component Value Date    ALT 51 12/05/2022    AST 31 12/05/2022    ALKPHOS 88 12/05/2022     Lab Results   Component Value Date    IRON 77 08/22/2020    TIBC 343 08/22/2020    FERRITIN 133 08/22/2020        Sleep studies:  CPAP titration study 8/19/20  Mr. Jeison Ruiz underwent titration of CPAP starting at 9 cc of H2O pressure. He demonstrated a normal sleep  latency and delayed REM latency arguing against hypersomnia. Sleep staging demonstrated increased REM sleep. His respiratory events were well controlled. He had a few events on CPAP 9 so he was titrated up to 10 cc of H2O  pressure. He did well at that pressure. Therefore, I recommend a switch to autoCPAP 10-20 cc of H2O pressure  with heated humidification. TCO2 monitoring was performed, while awake his TCO2 was 42- 44. It increased  during REM sleep to 51- 53. He only had a brief episode of hypoxia but was otherwise well controlled. He did demonstrate frequent limb movements (PLM index - 29.4). I would check iron and magnesium levels. I would consider a trial of Neurontin, Lyrica, Mirapex or Requip.      Compliance Data:  Type of CPAP:  autoPAP 10-20, mean pressure 11, max 15                                   Percent usage: 100%                                   Average time used: 5 hrs 24 mins                                   Residual AHI: 1.8     Compliance Data:  10/5/21 - 11/3/21                                  Type of CPAP:  autoPAP 10-20, mean 10.7, max 13.5                                   Average time used: 5 hrs 27 mins                                   Residual AHI: 1.8     Compliance Data: 1/8/22 - 2/6/22                                    Type of CPAP:  autoBIPAP with min EPAP 8, PSV 4, IPAP 25                                   Percent usage: 93%, 73% for > 4 hrs                                   Average time used: 5 hrs 11 minutes Residual AHI: 1.8                                      Compliance Data: 10/8/22 - 11/6/22                                    Type of CPAP:  autoBIPAP with min EPAP 8, PSV 4, IPAP 25                                   Percent usage: 100%, 63% for > 4 hrs                                   Average time used: 4 hrs 22 minutes                                   Residual AHI: 1.0     New Compliance Data: 9/13/23 - 10/12/23                                    Type of CPAP:  autoBIPAP with min EPAP 8, PSV 4, IPAP 25                                   Percent usage: 73%, 17% for > 4 hrs                                   Average time used: 2 hrs 58 minutes                                   Residual AHI: 1.4                           Benigno Cage, DO

## 2023-10-18 LAB

## 2023-10-18 NOTE — PROGRESS NOTES
Progress Note - Sleep Medicine  Eric Beal 38 y.o. male MRN: [id number]       Impression & Plan:   37 y/o M with PMHx of JAMIE on autoPAP, HTN and ADHD on Vyvanse who comes in for follow up of JAMIE. 1.   JAMIE previously on autoCPAP 7-15 but had difficulty with tolerance. Now on autoBIPAP with clinical benefit but with suboptimal compliance. Residual AHI - 1.4       -  We discussed the importance of being consistent with his BIPAP. He explained that it has been hard as he has been sleeping with his son. I again explained the importance of using the device       -  Continue autoBIPAP with min EPAP of 8, PSV of 4, IPAP 25. Follow compliance in 6 months. - An order for supplies was placed. Currently using full face mask. 2.  Excessive daytime sleepiness despite CPAP. Hx of ADHD      -  Management of JAMIE as below      -  Continue Vyvanse 40mg PO Daily for ADHD. This has also been helpful to help his daytime sleepiness. 3.  Weight loss - he has lost weight with wegovy. Continue to follow with nutrition. ______________________________________________________________________    HPI:    Eric Beal presents today for follow-up for his JAMIE. He states that he has been having difficulty maintaining consistency with his PAP. He states that he is often sleeping with his son and as a result he does not use the device often. He states that he is not noticing a significant improvement while using the device. He does states that he has lost weight while on Wegovy. Review of Systems:  Review of Systems   Constitutional:  Positive for fatigue. Negative for appetite change. Respiratory:  Positive for apnea. Negative for shortness of breath. Cardiovascular: Negative. Gastrointestinal: Negative. Endocrine: Negative. Genitourinary: Negative. Musculoskeletal: Negative. Skin: Negative. Allergic/Immunologic: Negative. Neurological: Negative. Hematological: Negative. Social history updates:  Social History     Tobacco Use   Smoking Status Never   Smokeless Tobacco Never     Social History     Socioeconomic History    Marital status: /Civil Union     Spouse name: jeronimo     Number of children: 1    Years of education: Not on file    Highest education level: Not on file   Occupational History    Not on file   Tobacco Use    Smoking status: Never    Smokeless tobacco: Never   Vaping Use    Vaping Use: Never used   Substance and Sexual Activity    Alcohol use: Yes     Comment: rare    Drug use: No    Sexual activity: Yes     Partners: Female   Other Topics Concern    Not on file   Social History Narrative    Who lives in your home: Wife and son     What type of home do you live in: Single house    Age of your home: 1994     How long have you been living there: 2016    Type of heat: Forced hot air    Type of fuel: Oil    What type of kaur is in your bedroom: Hardwood floor    Do you have the following in or near your home:    Air products: Central air, Air , [employer] and Dehumidifier    Pests: None    Pets: Dogs ([place]  and [place])     Are pets allowed in bedroom: Yes    Open fields, wooded areas nearby: Open fields and Wooded areas    Basement: Damp and Unfinished    Exposure to second hand smoke: No        Habits:    Caffeine: coffee 1 cup daily-soda and ice tea  minimally- hot tea when he's sick     Chocolate: 1x a month     Other:     Social Determinants of Health     Financial Resource Strain: Not on file   Food Insecurity: Not on file   Transportation Needs: Not on file   Physical Activity: Inactive (10/19/2020)    Exercise Vital Sign     Days of Exercise per Week: 0 days     Minutes of Exercise per Session: 0 min   Stress: Not on file   Social Connections: Not on file   Intimate Partner Violence: Not on file   Housing Stability: Not on file       PhysicalExamination:  Vitals:   /72 (BP Location: Left arm, Patient Position: Sitting, Cuff Size: Large)   Pulse 82   Ht 5' 11.81" (1.824 m)   Wt (!) 138 kg (305 lb)   SpO2 97%   BMI 41.58 kg/m²   General: Pleasant, Awake alert and oriented x 3, conversant without conversational dyspnea, NAD, normal affect  HEENT:  PERRL, Sclera noninjected, nonicteric OU, Nares patent,  no craniofacial abnormalities, Mucous membranes, moist, no oral lesions, normal dentition, Mallampati class 3  NECK: Trachea midline, no accessory muscle use, no stridor, no cervical or supraclavicular adenopathy, JVP not elevated  CARDIAC: Reg, single s1/S2, no m/r/g  PULM: CTA bilaterally no wheezing, rhonchi or rales  ABD: Normoactive bowel sounds, soft nontender, nondistended, no rebound, no rigidity, no guarding  EXT: No cyanosis, no clubbing, no edema, normal capillary refill  NEURO: no focal neurologic deficits, AAOx3, moving all extremities appropriately      Diagnostic Data:  Labs: I personally reviewed the most recent laboratory data pertinent to today's visit  not applicable    I have personally reviewed pertinent lab results. Lab Results   Component Value Date    WBC 6.22 11/07/2020    HGB 14.0 11/07/2020    HCT 43.4 11/07/2020    MCV 86 11/07/2020     11/07/2020     Lab Results   Component Value Date    GLUCOSE 122 02/10/2016    CALCIUM 9.0 12/05/2022    K 3.8 12/05/2022    CO2 29 12/05/2022     12/05/2022    BUN 16 12/05/2022    CREATININE 0.78 12/05/2022     No results found for: "IGE"  Lab Results   Component Value Date    ALT 51 12/05/2022    AST 31 12/05/2022    ALKPHOS 88 12/05/2022     Lab Results   Component Value Date    IRON 77 08/22/2020    TIBC 343 08/22/2020    FERRITIN 133 08/22/2020        Sleep studies:  CPAP titration study 8/19/20  Mr. Jennifer Barton underwent titration of CPAP starting at 9 cc of H2O pressure. He demonstrated a normal sleep  latency and delayed REM latency arguing against hypersomnia. Sleep staging demonstrated increased REM sleep. His respiratory events were well controlled.  He had a few events on CPAP 9 so he was titrated up to 10 cc of H2O  pressure. He did well at that pressure. Therefore, I recommend a switch to autoCPAP 10-20 cc of H2O pressure  with heated humidification. TCO2 monitoring was performed, while awake his TCO2 was 42- 44. It increased  during REM sleep to 51- 53. He only had a brief episode of hypoxia but was otherwise well controlled. He did demonstrate frequent limb movements (PLM index - 29.4). I would check iron and magnesium levels. I would consider a trial of Neurontin, Lyrica, Mirapex or Requip.      Compliance Data:  Type of CPAP:  autoPAP 10-20, mean pressure 11, max 15                                   Percent usage: 100%                                   Average time used: 5 hrs 24 mins                                   Residual AHI: 1.8     Compliance Data:  10/5/21 - 11/3/21                                  Type of CPAP:  autoPAP 10-20, mean 10.7, max 13.5                                   Average time used: 5 hrs 27 mins                                   Residual AHI: 1.8     Compliance Data: 1/8/22 - 2/6/22                                    Type of CPAP:  autoBIPAP with min EPAP 8, PSV 4, IPAP 25                                   Percent usage: 93%, 73% for > 4 hrs                                   Average time used: 5 hrs 11 minutes                                   Residual AHI: 1.8                                      Compliance Data: 10/8/22 - 11/6/22                                    Type of CPAP:  autoBIPAP with min EPAP 8, PSV 4, IPAP 25                                   Percent usage: 100%, 63% for > 4 hrs                                   Average time used: 4 hrs 22 minutes                                   Residual AHI: 1.0     New Compliance Data: 9/13/23 - 10/12/23                                    Type of CPAP:  autoBIPAP with min EPAP 8, PSV 4, IPAP 25                                   Percent usage: 73%, 17% for > 4 hrs Average time used: 2 hrs 58 minutes                                   Residual AHI: 1.4                           Selma Cage, DO

## 2023-10-23 DIAGNOSIS — F90.0 ATTENTION DEFICIT HYPERACTIVITY DISORDER (ADHD), PREDOMINANTLY INATTENTIVE TYPE: ICD-10-CM

## 2023-10-23 RX ORDER — LISDEXAMFETAMINE DIMESYLATE CAPSULES 40 MG/1
40 CAPSULE ORAL EVERY MORNING
Qty: 90 CAPSULE | Refills: 0 | Status: SHIPPED | OUTPATIENT
Start: 2023-10-23 | End: 2023-12-22

## 2023-10-25 DIAGNOSIS — E66.01 CLASS 3 SEVERE OBESITY DUE TO EXCESS CALORIES WITH SERIOUS COMORBIDITY AND BODY MASS INDEX (BMI) OF 40.0 TO 44.9 IN ADULT (HCC): ICD-10-CM

## 2023-11-07 ENCOUNTER — OFFICE VISIT (OUTPATIENT)
Dept: BARIATRICS | Facility: CLINIC | Age: 43
End: 2023-11-07
Payer: COMMERCIAL

## 2023-11-07 VITALS
DIASTOLIC BLOOD PRESSURE: 68 MMHG | BODY MASS INDEX: 40.96 KG/M2 | HEART RATE: 75 BPM | WEIGHT: 302.4 LBS | HEIGHT: 72 IN | SYSTOLIC BLOOD PRESSURE: 122 MMHG

## 2023-11-07 DIAGNOSIS — E66.01 CLASS 3 SEVERE OBESITY DUE TO EXCESS CALORIES WITH SERIOUS COMORBIDITY AND BODY MASS INDEX (BMI) OF 40.0 TO 44.9 IN ADULT (HCC): Primary | ICD-10-CM

## 2023-11-07 PROCEDURE — 99214 OFFICE O/P EST MOD 30 MIN: CPT | Performed by: NURSE PRACTITIONER

## 2023-11-07 NOTE — ASSESSMENT & PLAN NOTE
- Patient is pursuing Conservative Program and follow up visits with medical weight management provider  - Initial weight loss goal of 5-10% weight loss for improved health  - Labs reviewed from 8/2023  -Patient lifestyle habits were reviewed and he was encouraged to continue to focus on nutrition. Portions were discussed with patient including 4 to 6 ounces of protein with each meal, half a cup of starches with each meal, and 1 to 2 cups of vegetables with each meal.  He was encouraged to keep to these portions and try to balance his calories evenly. He was encouraged to also incorporate 2 small meals, mid morning and mid afternoon, to help him control his hunger at mealtimes. Patient was encouraged to continue to focus on his water intake, taking in only 64 ounces daily. Patient is tolerating Wegovy 1.7 mg well and will continue with this dose. May consider increasing to 2.4 again in the future but will readdress in a few months. Goals:  Do not skip meals. Calorie goal of 1800 to 2000 gerardo/day  Food log via kerrie - options include  www. ZowPownesspal.com, sparkpeople. com, Biscottiit.com, calorieking. com, baritastic  No sugary beverages. At least 64oz of water daily. Increase physical activity by 10 minutes daily.  Gradually increase physical activity to a goal of 5 days per week for 30 minutes of MODERATE intensity PLUS 2 days per week of FULL BODY resistance training

## 2023-11-07 NOTE — PROGRESS NOTES
Assessment/Plan:     Class 3 severe obesity due to excess calories with serious comorbidity and body mass index (BMI) of 40.0 to 44.9 in [facility])  - Patient is pursuing Conservative Program and follow up visits with medical weight management provider  - Initial weight loss goal of 5-10% weight loss for improved health  - Labs reviewed from 2023  -Patient lifestyle habits were reviewed and he was encouraged to continue to focus on nutrition. Portions were discussed with patient including 4 to 6 ounces of protein with each meal, half a cup of starches with each meal, and 1 to 2 cups of vegetables with each meal.  He was encouraged to keep to these portions and try to balance his calories evenly. He was encouraged to also incorporate 2 small meals, mid morning and mid afternoon, to help him control his hunger at mealtimes. Patient was encouraged to continue to focus on his water intake, taking in only 64 ounces daily. Patient is tolerating Wegovy 1.7 mg well and will continue with this dose. May consider increasing to 2.4 again in the future but will readdress in a few months. Goals:  Do not skip meals. Calorie goal of 1800 to 2000 gerardo/day  Food log via kerrie - options include  www. ExtraOrthonesspal.com, sparkpeople. com, GlobeSherpait.com, calorieking. com, baritastic  No sugary beverages. At least 64oz of water daily. Increase physical activity by 10 minutes daily. Gradually increase physical activity to a goal of 5 days per week for 30 minutes of MODERATE intensity PLUS 2 days per week of FULL BODY resistance training          John Bishop was seen today for follow-up.     Diagnoses and all orders for this visit:    Class 3 severe obesity due to excess calories with serious comorbidity and body mass index (BMI) of 40.0 to 44.9 in [facility])        Total time spent reviewing chart, interviewing patient, examining patient, discussing plan, answering all questions, and documentin minutes with >50% face-to-face time with the patient. Follow up in approximately 2 months with Non-Surgical Physician/Advanced Practitioner. Subjective:   Chief Complaint   Patient presents with    Follow-up     Pt is here for MWM f/u       Patient ID: Cecilia Dunham  is a 38 y.o. male with excess weight/obesity here to pursue weight management. Patient is pursuing Conservative Program.   Most recent notes and records were reviewed. HPI    Wt Readings from Last 20 Encounters:   11/07/23 (!) 137 kg (302 lb 6.4 oz)   10/17/23 (!) 138 kg (305 lb)   10/03/23 (!) 139 kg (305 lb 6.4 oz)   08/08/23 (!) 139 kg (306 lb 12.8 oz)   06/06/23 (!) 144 kg (316 lb 12.8 oz)   05/16/23 (!) 147 kg (323 lb 9.6 oz)   04/03/23 (!) 150 kg (330 lb 12.8 oz)   03/20/23 (!) 147 kg (324 lb 12.8 oz)   01/10/23 (!) 147 kg (325 lb)   01/03/23 (!) 149 kg (329 lb)   11/18/22 (!) 149 kg (329 lb)   11/14/22 (!) 147 kg (325 lb)   11/08/22 (!) 147 kg (325 lb)   09/19/22 (!) 148 kg (325 lb 12.8 oz)   08/22/22 (!) 149 kg (327 lb 6.4 oz)   04/20/22 (!) 154 kg (340 lb)   03/07/22 (!) 149 kg (327 lb 6.4 oz)   02/07/22 101 kg (222 lb)   11/05/21 (!) 145 kg (319 lb)   09/10/21 (!) 143 kg (315 lb)       Patient presents today to medical weight management office for follow up. Patient has been feeling well since reducing his Wegovy dose from 2.4mg to 1.7 mg. He no longer has any diarrhea. He states he feels slightly more hungry on the lower dose . He continues to try to work on his nutrition - eating smaller portions and making healthier food choices while on his lunch. He is also trying to eat slower and take his time so he can listen to him body when he is full. Patient is happy with his progress and wishes to continue with [facility].       Weight loss medication and dose: Wegovy 2.4mg  Started date and weight: 324.8 lbs  Current weight: 302.4 lbs (306.8 lbs last OV)  Difference: -22.4 lbs (-4.4 since last OV)      Starting BMI: 43.75  Current BMI: 40.79      Waist Measurement:  3/2023: 55 in  8/2023: 53 in      Diet recall:  B: steel cut oats with banana  L: less portions, healthier taking out choices (salad with grilled chicken)  D: gluten free diet - protein and vegetables    Hydration: 1 coffee with cream, water - 4 bottles at least, rare soda and iced tea  Alcohol: very rare  Smoking: no  Exercise: no formal exercise  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    Family History   Problem Relation Age of Onset    Hypertension Mother     Diabetes Mother         pre-diabetic     Heart attack Father     Diabetes Father     Hypertension Father     Obesity Father         hx LRYGB    Heart disease Father         hx MI age 36    Arthritis Family     Cancer Family     Cancer Paternal Grandmother         hx bladder cancer    Heart attack Paternal Grandfather     No Known Problems Son     Stroke Neg Hx     Thyroid disease Neg Hx         Review of Systems   Constitutional:  Negative for fatigue. HENT:  Negative for sore throat. Respiratory:  Negative for cough and shortness of breath. Cardiovascular:  Negative for chest pain, palpitations and leg swelling. Gastrointestinal:  Negative for abdominal pain, constipation, diarrhea and nausea. Genitourinary:  Negative for dysuria. Musculoskeletal:  Negative for arthralgias and back pain. Skin:  Negative for rash. Neurological:  Negative for headaches. Psychiatric/Behavioral:  Negative for dysphoric mood. The patient is not nervous/anxious. Objective:  /68   Pulse 75   Ht 6' 0.2" (1.834 m)   Wt (!) 137 kg (302 lb 6.4 oz)   BMI 40.79 kg/m²     Physical Exam  Vitals and nursing note reviewed.    Constitutional: Appearance: Normal appearance. He is obese. HENT:      Head: Normocephalic. Pulmonary:      Effort: Pulmonary effort is normal.   Neurological:      General: No focal deficit present. Mental Status: He is alert and oriented to person, place, and time. Psychiatric:         Mood and Affect: Mood normal.         Behavior: Behavior normal.         Thought Content:  Thought content normal.         Judgment: Judgment normal.            Labs   Most recent labs reviewed   Lab Results   Component Value Date    SODIUM 135 12/05/2022    K 3.8 12/05/2022     12/05/2022    CO2 29 12/05/2022    AGAP 3 (L) 12/05/2022    BUN 16 12/05/2022    CREATININE 0.78 12/05/2022    GLUC 102 10/09/2020    GLUF 97 12/05/2022    CALCIUM 9.0 12/05/2022    AST 31 12/05/2022    ALT 51 12/05/2022    ALKPHOS 88 12/05/2022    TP 7.3 12/05/2022    TBILI 0.49 12/05/2022    EGFR 112 12/05/2022     Lab Results   Component Value Date    HGBA1C 5.7 (H) 08/08/2023     Lab Results   Component Value Date    ORF1BLPGRZKK 2.160 07/07/2020     Lab Results   Component Value Date    CHOLESTEROL 135 08/08/2023     Lab Results   Component Value Date    HDL 31 (L) 08/08/2023     Lab Results   Component Value Date    TRIG 159 (H) 08/08/2023     Lab Results   Component Value Date    LDLCALC 72 08/08/2023

## 2023-11-12 DIAGNOSIS — E66.01 CLASS 3 SEVERE OBESITY DUE TO EXCESS CALORIES WITH SERIOUS COMORBIDITY AND BODY MASS INDEX (BMI) OF 40.0 TO 44.9 IN ADULT (HCC): ICD-10-CM

## 2024-01-02 DIAGNOSIS — E66.01 CLASS 3 SEVERE OBESITY DUE TO EXCESS CALORIES WITH SERIOUS COMORBIDITY AND BODY MASS INDEX (BMI) OF 40.0 TO 44.9 IN ADULT (HCC): ICD-10-CM

## 2024-01-17 ENCOUNTER — TELEPHONE (OUTPATIENT)
Age: 44
End: 2024-01-17

## 2024-01-17 DIAGNOSIS — F90.0 ATTENTION DEFICIT HYPERACTIVITY DISORDER (ADHD), PREDOMINANTLY INATTENTIVE TYPE: ICD-10-CM

## 2024-01-17 RX ORDER — LISDEXAMFETAMINE DIMESYLATE CAPSULES 40 MG/1
40 CAPSULE ORAL EVERY MORNING
Qty: 90 CAPSULE | Refills: 0 | Status: SHIPPED | OUTPATIENT
Start: 2024-01-17 | End: 2025-07-10

## 2024-01-17 RX ORDER — LISDEXAMFETAMINE DIMESYLATE CAPSULES 40 MG/1
40 CAPSULE ORAL EVERY MORNING
Qty: 90 CAPSULE | Refills: 0 | Status: SHIPPED | OUTPATIENT
Start: 2024-01-17 | End: 2024-01-17

## 2024-01-17 NOTE — TELEPHONE ENCOUNTER
Patient called in stating that his Vyranse medication needs a prior auth submitted. He asked if this can be expedited because he is going on vacation soon. Please advise.

## 2024-01-17 NOTE — TELEPHONE ENCOUNTER
Response with both insurances is that this medication is on formulary. Please contact the pharmacy to see what the patient needs.

## 2024-01-17 NOTE — TELEPHONE ENCOUNTER
PA for Vyvanse    Submitted via  [x]CMM-KEY [id number]  []SurescriEnsighten-Case ID #    []Faxed to plan   []Other website    []Phone call Case ID #      Office notes sent, clinical questions answered. Awaiting determination

## 2024-02-15 DIAGNOSIS — F90.0 ATTENTION DEFICIT HYPERACTIVITY DISORDER (ADHD), PREDOMINANTLY INATTENTIVE TYPE: ICD-10-CM

## 2024-02-15 RX ORDER — LISDEXAMFETAMINE DIMESYLATE CAPSULES 40 MG/1
40 CAPSULE ORAL EVERY MORNING
Qty: 30 CAPSULE | Refills: 0 | Status: SHIPPED | OUTPATIENT
Start: 2024-02-15 | End: 2025-08-08

## 2024-02-21 PROBLEM — J11.1 INFLUENZA: Status: RESOLVED | Noted: 2020-01-05 | Resolved: 2024-02-21

## 2024-02-21 PROBLEM — J18.9 RIGHT UPPER LOBE PNEUMONIA: Status: RESOLVED | Noted: 2020-10-07 | Resolved: 2024-02-21

## 2024-02-25 DIAGNOSIS — Z00.6 ENCOUNTER FOR EXAMINATION FOR NORMAL COMPARISON OR CONTROL IN CLINICAL RESEARCH PROGRAM: ICD-10-CM

## 2024-03-19 DIAGNOSIS — G47.33 OSA (OBSTRUCTIVE SLEEP APNEA): Primary | ICD-10-CM

## 2024-03-19 DIAGNOSIS — F90.0 ATTENTION DEFICIT HYPERACTIVITY DISORDER (ADHD), PREDOMINANTLY INATTENTIVE TYPE: ICD-10-CM

## 2024-03-19 RX ORDER — LISDEXAMFETAMINE DIMESYLATE CAPSULES 40 MG/1
40 CAPSULE ORAL EVERY MORNING
Qty: 90 CAPSULE | Refills: 0 | Status: SHIPPED | OUTPATIENT
Start: 2024-03-19 | End: 2024-06-17

## 2024-04-18 DIAGNOSIS — I10 ESSENTIAL HYPERTENSION: ICD-10-CM

## 2024-04-19 RX ORDER — LOSARTAN POTASSIUM 100 MG/1
100 TABLET ORAL DAILY
Qty: 30 TABLET | Refills: 0 | Status: SHIPPED | OUTPATIENT
Start: 2024-04-19

## 2024-05-02 DIAGNOSIS — F32.A ANXIETY AND DEPRESSION: ICD-10-CM

## 2024-05-02 DIAGNOSIS — F41.9 ANXIETY AND DEPRESSION: ICD-10-CM

## 2024-05-03 RX ORDER — ESCITALOPRAM OXALATE 10 MG/1
10 TABLET ORAL DAILY
Qty: 90 TABLET | Refills: 1 | Status: SHIPPED | OUTPATIENT
Start: 2024-05-03

## 2024-06-03 ENCOUNTER — APPOINTMENT (OUTPATIENT)
Dept: LAB | Facility: HOSPITAL | Age: 44
End: 2024-06-03
Payer: COMMERCIAL

## 2024-06-03 DIAGNOSIS — Z00.6 ENCOUNTER FOR EXAMINATION FOR NORMAL COMPARISON OR CONTROL IN CLINICAL RESEARCH PROGRAM: ICD-10-CM

## 2024-06-03 DIAGNOSIS — E55.9 VITAMIN D DEFICIENCY: ICD-10-CM

## 2024-06-03 DIAGNOSIS — Z00.8 ENCOUNTER FOR OTHER GENERAL EXAMINATION: ICD-10-CM

## 2024-06-03 LAB
25(OH)D3 SERPL-MCNC: 29.2 NG/ML (ref 30–100)
CHOLEST SERPL-MCNC: 129 MG/DL
EST. AVERAGE GLUCOSE BLD GHB EST-MCNC: 117 MG/DL
HBA1C MFR BLD: 5.7 %
HDLC SERPL-MCNC: 29 MG/DL
LDLC SERPL CALC-MCNC: 39 MG/DL (ref 0–100)
NONHDLC SERPL-MCNC: 100 MG/DL
TRIGL SERPL-MCNC: 304 MG/DL

## 2024-06-03 PROCEDURE — 36415 COLL VENOUS BLD VENIPUNCTURE: CPT

## 2024-06-03 PROCEDURE — 83036 HEMOGLOBIN GLYCOSYLATED A1C: CPT

## 2024-06-03 PROCEDURE — 82306 VITAMIN D 25 HYDROXY: CPT

## 2024-06-03 PROCEDURE — 80061 LIPID PANEL: CPT

## 2024-06-10 DIAGNOSIS — I10 ESSENTIAL HYPERTENSION: ICD-10-CM

## 2024-06-10 NOTE — TELEPHONE ENCOUNTER
[facility] (supporting Enzo Bedolla, )         6/10/24 12:38 PM  Can I please get a 90 day refill on my 100mg Losartan?

## 2024-06-11 RX ORDER — LOSARTAN POTASSIUM 100 MG/1
100 TABLET ORAL DAILY
Qty: 30 TABLET | Refills: 0 | Status: SHIPPED | OUTPATIENT
Start: 2024-06-11 | End: 2024-06-13

## 2024-06-12 NOTE — TELEPHONE ENCOUNTER
Patient called to see why his rx was for only 30 days and not 90 days. I explained to him his doctor need to order when updated labs and he need to get them done. Patient said ok, thank you!

## 2024-06-12 NOTE — TELEPHONE ENCOUNTER
PT went to  the refill of Losartan and it was only a 30 day supply. He said he was told he needed updated blood work., and the 30 day is more expensive than a 90 day supply. PT requesting to please cancel the 30 day and order the 90 day supply, . also if bloodwork is needed, to please put the order in and he will get it completed on Saturday.    Please advise    Thank You

## 2024-06-13 DIAGNOSIS — F90.0 ATTENTION DEFICIT HYPERACTIVITY DISORDER (ADHD), PREDOMINANTLY INATTENTIVE TYPE: ICD-10-CM

## 2024-06-13 DIAGNOSIS — I10 ESSENTIAL HYPERTENSION: ICD-10-CM

## 2024-06-13 RX ORDER — LISDEXAMFETAMINE DIMESYLATE 40 MG/1
40 CAPSULE ORAL EVERY MORNING
Qty: 90 CAPSULE | Refills: 0 | Status: SHIPPED | OUTPATIENT
Start: 2024-06-13 | End: 2024-06-14 | Stop reason: SDUPTHER

## 2024-06-13 RX ORDER — LOSARTAN POTASSIUM 100 MG/1
100 TABLET ORAL DAILY
Qty: 90 TABLET | Refills: 1 | Status: SHIPPED | OUTPATIENT
Start: 2024-06-13

## 2024-06-14 DIAGNOSIS — F90.0 ATTENTION DEFICIT HYPERACTIVITY DISORDER (ADHD), PREDOMINANTLY INATTENTIVE TYPE: ICD-10-CM

## 2024-06-14 RX ORDER — LISDEXAMFETAMINE DIMESYLATE 40 MG/1
40 CAPSULE ORAL EVERY MORNING
Qty: 90 CAPSULE | Refills: 0 | Status: SHIPPED | OUTPATIENT
Start: 2024-06-14 | End: 2024-09-12

## 2024-07-05 DIAGNOSIS — M54.16 LUMBAR BACK PAIN WITH RADICULOPATHY AFFECTING LEFT LOWER EXTREMITY: Primary | ICD-10-CM

## 2024-07-18 ENCOUNTER — OFFICE VISIT (OUTPATIENT)
Dept: PHYSICAL THERAPY | Facility: CLINIC | Age: 44
End: 2024-07-18
Payer: COMMERCIAL

## 2024-07-18 DIAGNOSIS — M54.16 LUMBAR BACK PAIN WITH RADICULOPATHY AFFECTING LEFT LOWER EXTREMITY: Primary | ICD-10-CM

## 2024-07-18 PROCEDURE — 97162 PT EVAL MOD COMPLEX 30 MIN: CPT | Performed by: PHYSICAL THERAPIST

## 2024-07-18 PROCEDURE — 97110 THERAPEUTIC EXERCISES: CPT | Performed by: PHYSICAL THERAPIST

## 2024-07-23 ENCOUNTER — OFFICE VISIT (OUTPATIENT)
Dept: PHYSICAL THERAPY | Facility: CLINIC | Age: 44
End: 2024-07-23
Payer: COMMERCIAL

## 2024-07-23 DIAGNOSIS — M54.16 LUMBAR BACK PAIN WITH RADICULOPATHY AFFECTING LEFT LOWER EXTREMITY: Primary | ICD-10-CM

## 2024-07-23 PROCEDURE — 97110 THERAPEUTIC EXERCISES: CPT | Performed by: PHYSICAL THERAPIST

## 2024-07-23 NOTE — PROGRESS NOTES
Daily Note     Today's date: 2024  Patient name: Joe Perez  : 1980  MRN: [id number]  Referring provider: Self, Referral  Dx:   Encounter Diagnosis     ICD-10-CM    1. Lumbar back pain with radiculopathy affecting left lower extremity  M54.16                      Subjective: Pt presents to clinic without any shin symptom and very mild symptoms under L great toe. Pt has been diligent in his HEP. He is not having back pain. Has general tightness in hips and legs.       Objective: See treatment diary below. HEP review good technique. Progressed HEP to RFISit with self OP using mulligan belt wrapped around back legs of chair. Baseline L ankle DF 4-/5, R 5/5 MMT      Assessment: Tolerated treatment well. Patient demo full return of strength with today's session. No shin symptoms. Trace symptoms under great toe at end of session, which pt confirms feels it is improving.       Plan: Continue per plan of care.  F/u in 2 weeks.      Eval/ Re-eval Auth #/ Referral # Total visits Start date  Expiration date Total active units  Total manual units  PT only or  PT+OT?    Requested                                                        Date:           Total authorized units:  Active:            Manual:           Total remaining units:  Active:               Manual:                Date:           Total authorized units: Active:            Manual:           Total remaining units:  Active:               Manual:                    Precautions: Standard.      Visit 1 2      24                          Neuro Re-Ed                                                        Ther Ex       RFIStand with self OP x10 2x10     RFISit with self OP   Using strap 4x10                                               Ther Activity                                          Modalities

## 2024-08-06 ENCOUNTER — APPOINTMENT (OUTPATIENT)
Dept: PHYSICAL THERAPY | Facility: CLINIC | Age: 44
End: 2024-08-06
Payer: COMMERCIAL

## 2024-08-13 ENCOUNTER — OFFICE VISIT (OUTPATIENT)
Dept: PHYSICAL THERAPY | Facility: CLINIC | Age: 44
End: 2024-08-13
Payer: COMMERCIAL

## 2024-08-13 DIAGNOSIS — M54.16 LUMBAR BACK PAIN WITH RADICULOPATHY AFFECTING LEFT LOWER EXTREMITY: Primary | ICD-10-CM

## 2024-08-13 PROCEDURE — 97110 THERAPEUTIC EXERCISES: CPT | Performed by: PHYSICAL THERAPIST

## 2024-08-13 NOTE — PROGRESS NOTES
"Daily Note     Today's date: 2024  Patient name: Joe Perez  : 1980  MRN: [id number]  Referring provider: Self, Referral  Dx:   Encounter Diagnosis     ICD-10-CM    1. Lumbar back pain with radiculopathy affecting left lower extremity  M54.16                      Subjective: Pt had a set back in last 2 weeks when he was painting the ceiling of the bathroom and this caused shin symptoms and toe symptoms to inc and remain present, he did not stretch during this activity but has been afterward and thusly feels reduction. Today he presents with very mild symptoms in L shin and toe.       Objective: See treatment diary below. Lumbar AROM: Flexion WFL, Ext trace loss inc symptoms, B SGIS WNL. Initiated mechanical traction today per pt inquiry.       Assessment: Tolerated treatment well. Patient demo ongoing symptoms which continue to respond very well and rapidly to repeated motions. Extension immediately provokes symptoms which is not inconsistent with stenosis. No change in toe symptoms following treatment today however return to repeated motions in flexion dec/B toe symptoms post traction.       Plan: Continue per plan of care.      Eval/ Re-eval Auth #/ Referral # Total visits Start date  Expiration date Total active units  Total manual units  PT only or  PT+OT?    \"30 with [facility], then $20 copay with [facility]\" NO AUTH REQUIRED                                                   Precautions: Standard.      Visit 1 2 3     24                         Neuro Re-Ed                                                        Ther Ex       RFIStand with self OP x10 2x10 3x10    RFISit with self OP   Using strap 4x10 Rev                                              Ther Activity       Mech traction    8' static pull 6 step ramp up/down, 80lb pull, rope angle 10 deg, 15' set up/breakdown/edu                                Modalities                            "

## 2024-08-27 ENCOUNTER — OFFICE VISIT (OUTPATIENT)
Dept: PHYSICAL THERAPY | Facility: CLINIC | Age: 44
End: 2024-08-27
Payer: COMMERCIAL

## 2024-08-27 DIAGNOSIS — M54.16 LUMBAR BACK PAIN WITH RADICULOPATHY AFFECTING LEFT LOWER EXTREMITY: Primary | ICD-10-CM

## 2024-08-27 PROCEDURE — 97110 THERAPEUTIC EXERCISES: CPT

## 2024-08-27 PROCEDURE — 97530 THERAPEUTIC ACTIVITIES: CPT | Performed by: PHYSICAL THERAPIST

## 2024-08-27 NOTE — PROGRESS NOTES
"Daily Note     Today's date: 2024  Patient name: Joe Perez  : 1980  MRN: [id number]  Referring provider: Self, Referral  Dx:   Encounter Diagnosis     ICD-10-CM    1. Lumbar back pain with radiculopathy affecting left lower extremity  M54.16                      Subjective: Pt continues to confirm that his symptoms resolve with his HEP and extension based/OH work cont to provoke symptoms. Carrying his youngest son in a lordotic posture also provokes his symptoms. Pt feels he is not being diligent enough in his HEP and application of repeated motions.       Objective: See treatment diary below. L ankle DF 3+/5 start of session, toe symptoms. Post session L ankle DF 5/5 MMT. Post session \"normal\" toe sensation, nil numbness. Inc time spent reassessing.       Assessment: Tolerated treatment well. Patient demo great response to repeated motions in direction of preference. Pt will cont to maximize indep through HEP.       Plan: Continue per plan of care. Will f/u with to via email in 2 weeks.      Eval/ Re-eval Auth #/ Referral # Total visits Start date  Expiration date Total active units  Total manual units  PT only or  PT+OT?    \"30 with [facility], then $20 copay with [facility]\" NO AUTH REQUIRED                                                   Precautions: Standard.      Visit 1 2 3 4    24                        Neuro Re-Ed                                                        Ther Ex       RFIStand with self OP x10 2x10 3x10 5x10 without OP   RFISit with self OP   Using strap 4x10 Rev                                              Ther Activity       Mech traction    8' static pull 6 step ramp up/down, 80lb pull, rope angle 10 deg, 15' set up/breakdown/edu                                Modalities                              "

## 2024-09-03 ENCOUNTER — PATIENT MESSAGE (OUTPATIENT)
Dept: FAMILY MEDICINE CLINIC | Facility: CLINIC | Age: 44
End: 2024-09-03

## 2024-09-06 LAB
APOB+LDLR+PCSK9 GENE MUT ANL BLD/T: NOT DETECTED
BRCA1+BRCA2 DEL+DUP + FULL MUT ANL BLD/T: NOT DETECTED
MLH1+MSH2+MSH6+PMS2 GN DEL+DUP+FUL M: NOT DETECTED

## 2024-09-09 DIAGNOSIS — F90.0 ATTENTION DEFICIT HYPERACTIVITY DISORDER (ADHD), PREDOMINANTLY INATTENTIVE TYPE: ICD-10-CM

## 2024-09-09 DIAGNOSIS — F41.9 ANXIETY AND DEPRESSION: ICD-10-CM

## 2024-09-09 DIAGNOSIS — I10 ESSENTIAL HYPERTENSION: ICD-10-CM

## 2024-09-09 DIAGNOSIS — F32.A ANXIETY AND DEPRESSION: ICD-10-CM

## 2024-09-09 RX ORDER — LISDEXAMFETAMINE DIMESYLATE 40 MG/1
40 CAPSULE ORAL EVERY MORNING
Qty: 90 CAPSULE | Refills: 0 | Status: SHIPPED | OUTPATIENT
Start: 2024-09-09 | End: 2024-12-08

## 2024-09-09 RX ORDER — LOSARTAN POTASSIUM 100 MG/1
100 TABLET ORAL DAILY
Qty: 90 TABLET | Refills: 0 | Status: SHIPPED | OUTPATIENT
Start: 2024-09-09

## 2024-09-09 RX ORDER — ESCITALOPRAM OXALATE 10 MG/1
10 TABLET ORAL DAILY
Qty: 90 TABLET | Refills: 0 | Status: SHIPPED | OUTPATIENT
Start: 2024-09-09

## 2024-09-09 NOTE — TELEPHONE ENCOUNTER
Patient of Dr. Franklin at [facility] [place].    Rx request forwarded to above.

## 2024-09-12 DIAGNOSIS — Z13.6 ENCOUNTER FOR LIPID SCREENING FOR CARDIOVASCULAR DISEASE: Primary | ICD-10-CM

## 2024-09-12 DIAGNOSIS — Z13.220 ENCOUNTER FOR LIPID SCREENING FOR CARDIOVASCULAR DISEASE: Primary | ICD-10-CM

## 2024-09-12 DIAGNOSIS — Z13.1 SCREENING FOR DIABETES MELLITUS (DM): ICD-10-CM

## 2024-09-12 DIAGNOSIS — G47.33 OSA (OBSTRUCTIVE SLEEP APNEA): ICD-10-CM

## 2024-09-12 DIAGNOSIS — E78.2 MIXED HYPERLIPIDEMIA: ICD-10-CM

## 2024-09-12 DIAGNOSIS — N41.1 CHRONIC PROSTATITIS: Primary | ICD-10-CM

## 2024-09-25 NOTE — PROGRESS NOTES
Ambulatory Visit  Name: Joe Perez      : 1980      MRN: [id number]  Encounter Provider: Franklin Orr MD  Encounter Date: 2024   Encounter department: [facility] [place]    Assessment & Plan  Chronic prostatitis/chronic pelvic pain syndrome  I discussed the patients lab findings and reviewed outside notes including those from his primary care doctor. Urine dip negative.    We discussed the nature of chronic pelvic pain. The patient understands that this is a diagnosis of exclusion once infection and other anatomic abnormalities have been ruled out and is not specifically a pathology of the prostate at all. Rather, it represents a continuum of symptoms typically related to abnormalities in neurologic and muscular coordination and relaxation in the pelvis. Patients will frequently have a history of back pain and/or other chronic pain syndromes. The discomfort is not necessarily associated with voiding.     We discussed that in patients with this syndrome, increasing pelvic floor muscle tension (I.e. kegel exercises) may actually be counterproductive. Typically, biofeedback and relaxing pelvic floor physical therapy can be helpful. Most importantly, re-framing the act of volitional voiding as a relaxation of pelvic floor muscles as opposed to straining can be helpful.     He is currently improving without intervention. He understands the options available to him if his symptoms worsen or fail to improve.    Plan   - He will follow up as needed pending evolution of his symptoms  - He will increase his hydration and consider repeating UA to assess for proteinuria with his PCP.  Orders:    Ambulatory Referral to Urology    POCT urine dip    Screening and evaluation for vasectomy  The patient is interested in vasectomy.  with two children and not planning on more. We discussed the procedure. He is ok with proceeding with office vasectomy vs OR with sedation. We discussed  "risks including unintended pregnancy, bleeding, chronic testicular pain, and testicular atrophy. He understands that vasectomy is considered permanent sterilization.     - He will discuss with family and call into the office to schedule an office vasectomy if desired         History of Present Illness     Joe Perez is a 43 y.o. male who presents with pelvic pain. He endorses a prior diagnosis of chronic prostatitis 20 years ago. He was advised at that time to initiate Kegel exercises, which he has performed intermittently. He was given antibiotics at that time, which did not help, but eventually his symptoms resolved of their own accord. He recently has had return of his pain. He describes it as sitting on a tennis ball under his scrotum. He sometimes has diarrhea and notices the discomfort is worse during these periods    Over the past week, his symptoms have improved and largely resolved. He has noticed an association between anxiety regarding the symptoms and worsening of them.     He has a history of lumbar back pain with radiculopathy for which he sees physical therapy.     Denies dysuria, frequency, urgency, and incontinence. No gross hematuria.     He is also interested in vasectomy. He is  with two children and does not desire any future children.     History obtained from : patient        Objective     /80 (BP Location: Left arm, Patient Position: Sitting, Cuff Size: Large)   Pulse 78   Ht 6' 0.2\" (1.834 m)   Wt (!) 145 kg (319 lb)   SpO2 95%   BMI 43.02 kg/m²   Physical Exam  Vitals:    09/30/24 0748   BP: 130/80   Pulse: 78   SpO2: 95%      General: Well appearing, no acute distress   HEENT: normocephalic, atraumatic  Eyes: extraocular movements intact  Cardiovascular: normal rate   Respiratory: no respiratory distress, effort normal   Abdominal: Non-distended, non-tender   : Deferred  MSK: Grossly normal range of motion   Neurological: No gross focal deficits  Behavioral: Normal " "mood and affect     Results  No results found for: \"PSA\"  Lab Results   Component Value Date    GLUCOSE 122 02/10/2016    CALCIUM 9.0 12/05/2022    K 3.8 12/05/2022    CO2 29 12/05/2022     12/05/2022    BUN 16 12/05/2022    CREATININE 0.78 12/05/2022     Lab Results   Component Value Date    WBC 6.22 11/07/2020    HGB 14.0 11/07/2020    HCT 43.4 11/07/2020    MCV 86 11/07/2020     11/07/2020       Office Urine Dip  Recent Results (from the past 1 hour(s))   POCT urine dip    Collection Time: 09/30/24  7:50 AM   Result Value Ref Range    LEUKOCYTE ESTERASE,UA -     NITRITE,UA -     SL AMB POCT UROBILINOGEN 0.2     POCT URINE PROTEIN trace      PH,UA 6.0     BLOOD,UA -     SPECIFIC GRAVITY,UA 1.025     KETONES,UA -     BILIRUBIN,UA -     GLUCOSE, UA -      COLOR,UA yellow     CLARITY,UA clear    ]    Administrative Statements         "

## 2024-09-30 ENCOUNTER — LAB (OUTPATIENT)
Dept: LAB | Facility: CLINIC | Age: 44
End: 2024-09-30
Payer: COMMERCIAL

## 2024-09-30 ENCOUNTER — OFFICE VISIT (OUTPATIENT)
Dept: PULMONOLOGY | Facility: CLINIC | Age: 44
End: 2024-09-30
Payer: COMMERCIAL

## 2024-09-30 ENCOUNTER — OFFICE VISIT (OUTPATIENT)
Dept: UROLOGY | Facility: AMBULATORY SURGERY CENTER | Age: 44
End: 2024-09-30
Payer: COMMERCIAL

## 2024-09-30 VITALS
BODY MASS INDEX: 42.66 KG/M2 | OXYGEN SATURATION: 95 % | HEART RATE: 78 BPM | DIASTOLIC BLOOD PRESSURE: 80 MMHG | HEIGHT: 72 IN | SYSTOLIC BLOOD PRESSURE: 130 MMHG | WEIGHT: 315 LBS

## 2024-09-30 DIAGNOSIS — N41.1 CHRONIC PROSTATITIS/CHRONIC PELVIC PAIN SYNDROME: Primary | ICD-10-CM

## 2024-09-30 DIAGNOSIS — Z30.09 SCREENING AND EVALUATION FOR VASECTOMY: ICD-10-CM

## 2024-09-30 DIAGNOSIS — G47.33 OSA (OBSTRUCTIVE SLEEP APNEA): ICD-10-CM

## 2024-09-30 DIAGNOSIS — E78.2 MIXED HYPERLIPIDEMIA: ICD-10-CM

## 2024-09-30 DIAGNOSIS — G89.4 CHRONIC PROSTATITIS/CHRONIC PELVIC PAIN SYNDROME: Primary | ICD-10-CM

## 2024-09-30 DIAGNOSIS — Z13.1 SCREENING FOR DIABETES MELLITUS (DM): ICD-10-CM

## 2024-09-30 DIAGNOSIS — Z23 NEEDS FLU SHOT: Primary | ICD-10-CM

## 2024-09-30 LAB
ALBUMIN SERPL BCG-MCNC: 4.3 G/DL (ref 3.5–5)
ALP SERPL-CCNC: 65 U/L (ref 34–104)
ALT SERPL W P-5'-P-CCNC: 46 U/L (ref 7–52)
ANION GAP SERPL CALCULATED.3IONS-SCNC: 8 MMOL/L (ref 4–13)
AST SERPL W P-5'-P-CCNC: 35 U/L (ref 13–39)
BASOPHILS # BLD AUTO: 0.04 THOUSANDS/ÂΜL (ref 0–0.1)
BASOPHILS NFR BLD AUTO: 1 % (ref 0–1)
BILIRUB SERPL-MCNC: 0.45 MG/DL (ref 0.2–1)
BUN SERPL-MCNC: 15 MG/DL (ref 5–25)
CALCIUM SERPL-MCNC: 9 MG/DL (ref 8.4–10.2)
CHLORIDE SERPL-SCNC: 104 MMOL/L (ref 96–108)
CHOLEST SERPL-MCNC: 141 MG/DL
CO2 SERPL-SCNC: 27 MMOL/L (ref 21–32)
CREAT SERPL-MCNC: 0.67 MG/DL (ref 0.6–1.3)
EOSINOPHIL # BLD AUTO: 0.25 THOUSAND/ÂΜL (ref 0–0.61)
EOSINOPHIL NFR BLD AUTO: 4 % (ref 0–6)
ERYTHROCYTE [DISTWIDTH] IN BLOOD BY AUTOMATED COUNT: 12.8 % (ref 11.6–15.1)
GFR SERPL CREATININE-BSD FRML MDRD: 119 ML/MIN/1.73SQ M
GLUCOSE P FAST SERPL-MCNC: 95 MG/DL (ref 65–99)
HCT VFR BLD AUTO: 42.7 % (ref 36.5–49.3)
HDLC SERPL-MCNC: 29 MG/DL
HGB BLD-MCNC: 14.3 G/DL (ref 12–17)
IMM GRANULOCYTES # BLD AUTO: 0.03 THOUSAND/UL (ref 0–0.2)
IMM GRANULOCYTES NFR BLD AUTO: 1 % (ref 0–2)
LDLC SERPL CALC-MCNC: 73 MG/DL (ref 0–100)
LYMPHOCYTES # BLD AUTO: 2.72 THOUSANDS/ÂΜL (ref 0.6–4.47)
LYMPHOCYTES NFR BLD AUTO: 42 % (ref 14–44)
MCH RBC QN AUTO: 28.5 PG (ref 26.8–34.3)
MCHC RBC AUTO-ENTMCNC: 33.5 G/DL (ref 31.4–37.4)
MCV RBC AUTO: 85 FL (ref 82–98)
MONOCYTES # BLD AUTO: 0.66 THOUSAND/ÂΜL (ref 0.17–1.22)
MONOCYTES NFR BLD AUTO: 10 % (ref 4–12)
NEUTROPHILS # BLD AUTO: 2.63 THOUSANDS/ÂΜL (ref 1.85–7.62)
NEUTS SEG NFR BLD AUTO: 42 % (ref 43–75)
NRBC BLD AUTO-RTO: 0 /100 WBCS
PLATELET # BLD AUTO: 246 THOUSANDS/UL (ref 149–390)
PMV BLD AUTO: 9.7 FL (ref 8.9–12.7)
POTASSIUM SERPL-SCNC: 3.9 MMOL/L (ref 3.5–5.3)
PROT SERPL-MCNC: 7.2 G/DL (ref 6.4–8.4)
RBC # BLD AUTO: 5.02 MILLION/UL (ref 3.88–5.62)
SL AMB  POCT GLUCOSE, UA: ABNORMAL
SL AMB LEUKOCYTE ESTERASE,UA: ABNORMAL
SL AMB POCT BILIRUBIN,UA: ABNORMAL
SL AMB POCT BLOOD,UA: ABNORMAL
SL AMB POCT CLARITY,UA: CLEAR
SL AMB POCT COLOR,UA: YELLOW
SL AMB POCT KETONES,UA: ABNORMAL
SL AMB POCT NITRITE,UA: ABNORMAL
SL AMB POCT PH,UA: 6
SL AMB POCT SPECIFIC GRAVITY,UA: 1.02
SL AMB POCT URINE PROTEIN: ABNORMAL
SL AMB POCT UROBILINOGEN: 0.2
SODIUM SERPL-SCNC: 139 MMOL/L (ref 135–147)
TRIGL SERPL-MCNC: 193 MG/DL
WBC # BLD AUTO: 6.33 THOUSAND/UL (ref 4.31–10.16)

## 2024-09-30 PROCEDURE — 90673 RIV3 VACCINE NO PRESERV IM: CPT

## 2024-09-30 PROCEDURE — 80061 LIPID PANEL: CPT

## 2024-09-30 PROCEDURE — 36415 COLL VENOUS BLD VENIPUNCTURE: CPT

## 2024-09-30 PROCEDURE — 99203 OFFICE O/P NEW LOW 30 MIN: CPT

## 2024-09-30 PROCEDURE — 90471 IMMUNIZATION ADMIN: CPT

## 2024-09-30 PROCEDURE — 80053 COMPREHEN METABOLIC PANEL: CPT

## 2024-09-30 PROCEDURE — 85025 COMPLETE CBC W/AUTO DIFF WBC: CPT

## 2024-09-30 PROCEDURE — 81002 URINALYSIS NONAUTO W/O SCOPE: CPT

## 2024-09-30 NOTE — ASSESSMENT & PLAN NOTE
I discussed the patients lab findings and reviewed outside notes including those from his primary care doctor. Urine dip negative.    We discussed the nature of chronic pelvic pain. The patient understands that this is a diagnosis of exclusion once infection and other anatomic abnormalities have been ruled out and is not specifically a pathology of the prostate at all. Rather, it represents a continuum of symptoms typically related to abnormalities in neurologic and muscular coordination and relaxation in the pelvis. Patients will frequently have a history of back pain and/or other chronic pain syndromes. The discomfort is not necessarily associated with voiding.     We discussed that in patients with this syndrome, increasing pelvic floor muscle tension (I.e. kegel exercises) may actually be counterproductive. Typically, biofeedback and relaxing pelvic floor physical therapy can be helpful. Most importantly, re-framing the act of volitional voiding as a relaxation of pelvic floor muscles as opposed to straining can be helpful.     He is currently improving without intervention. He understands the options available to him if his symptoms worsen or fail to improve.    Plan   - He will follow up as needed pending evolution of his symptoms  - He will increase his hydration and consider repeating UA to assess for proteinuria with his PCP.  Orders:    Ambulatory Referral to Urology    POCT urine dip

## 2024-10-07 ENCOUNTER — OFFICE VISIT (OUTPATIENT)
Dept: FAMILY MEDICINE CLINIC | Facility: CLINIC | Age: 44
End: 2024-10-07
Payer: COMMERCIAL

## 2024-10-07 VITALS
HEART RATE: 88 BPM | TEMPERATURE: 98.5 F | RESPIRATION RATE: 16 BRPM | DIASTOLIC BLOOD PRESSURE: 66 MMHG | HEIGHT: 72 IN | WEIGHT: 315 LBS | SYSTOLIC BLOOD PRESSURE: 136 MMHG | BODY MASS INDEX: 42.66 KG/M2 | OXYGEN SATURATION: 96 %

## 2024-10-07 DIAGNOSIS — R73.01 IMPAIRED FASTING GLUCOSE: ICD-10-CM

## 2024-10-07 DIAGNOSIS — E78.2 MIXED HYPERLIPIDEMIA: ICD-10-CM

## 2024-10-07 DIAGNOSIS — I10 PRIMARY HYPERTENSION: ICD-10-CM

## 2024-10-07 DIAGNOSIS — F90.0 ATTENTION DEFICIT HYPERACTIVITY DISORDER (ADHD), PREDOMINANTLY INATTENTIVE TYPE: ICD-10-CM

## 2024-10-07 DIAGNOSIS — E66.01 MORBID OBESITY WITH BMI OF 40.0-44.9, ADULT (HCC): ICD-10-CM

## 2024-10-07 DIAGNOSIS — Z00.00 ANNUAL PHYSICAL EXAM: Primary | ICD-10-CM

## 2024-10-07 PROCEDURE — 99396 PREV VISIT EST AGE 40-64: CPT | Performed by: FAMILY MEDICINE

## 2024-10-07 NOTE — PROGRESS NOTES
Adult Annual Physical  Name: Joe Perez      : 1980      MRN: [id number]  Encounter Provider: Enzo Bedolla DO  Encounter Date: 10/7/2024   Encounter department: [facility]    Assessment & Plan  Annual physical exam  Joe is doing well overall.  He is to continue working on healthy diet and exercise as discussed.       Morbid obesity with BMI of 40.0-44.9, adult (HCC)           Mixed hyperlipidemia    Orders:    Lipid Panel With Direct LDL; Future    Impaired fasting glucose    Orders:    Hemoglobin A1C; Future    Comprehensive metabolic panel; Future    Primary hypertension  Blood Pressure: 136/66    -Stable in office today.  Does monitor on occasion at home and always well-controlled.  Asymptomatic.  -Continue losartan 100 mg p.o. daily  -Continue dietary modifications -low-salt, low-carb, low sugar, whole food based eating.  -Discussed exercise recommendations -working toward 30 minutes of moderate intensity exercise 5 days a week.         Attention deficit hyperactivity disorder (ADHD), predominantly inattentive type  - Stable.  Continue on Vyvanse as prescribed.  PDMP reviewed and appropriate.         Immunizations and preventive care screenings were discussed with patient today. Appropriate education was printed on patient's after visit summary.    Discussed risks and benefits of prostate cancer screening. We discussed the controversial history of PSA screening for prostate cancer in the [place] as well as the risk of over detection and over treatment of prostate cancer by way of PSA screening.  The patient understands that PSA blood testing is an imperfect way to screen for prostate cancer and that elevated PSA levels in the blood may also be caused by infection, inflammation, prostatic trauma or manipulation, urological procedures, or by benign prostatic enlargement.    The role of the digital rectal examination in prostate cancer screening was also discussed and  I discussed with him that there is large interobserver variability in the findings of digital rectal examination.    Counseling:  Alcohol/drug use: discussed moderation in alcohol intake, the recommendations for healthy alcohol use, and avoidance of illicit drug use.  Dental Health: discussed importance of regular tooth brushing, flossing, and dental visits.  Injury prevention: discussed safety/seat belts, safety helmets, smoke detectors, carbon monoxide detectors, and smoking near bedding or upholstery.  Sexual health: discussed sexually transmitted diseases, partner selection, use of condoms, avoidance of unintended pregnancy, and contraceptive alternatives.  Exercise: the importance of regular exercise/physical activity was discussed. Recommend exercise 3-5 times per week for at least 30 minutes.          History of Present Illness     Adult Annual Physical:  Patient presents for annual physical.     Diet and Physical Activity:  - Diet/Nutrition: consuming 3-5 servings of fruits/vegetables daily and well balanced diet.  - Exercise: walking.    Depression Screening:  - PHQ-2 Score: 0    General Health:  - Sleep: sleeps well and 7-8 hours of sleep on average.  - Hearing: normal hearing bilateral ears.  - Vision: goes for regular eye exams.  - Dental: brushes teeth twice daily.     Health:  - History of STDs: no.   - Urinary symptoms: none.     Advanced Care Planning:  - Has an advanced directive?: no    - Has a durable medical POA?: no    - ACP document given to patient?: no      Review of Systems   Constitutional:  Negative for chills and fever.   HENT:  Negative for ear pain and sore throat.    Eyes:  Negative for pain and visual disturbance.   Respiratory:  Negative for cough and shortness of breath.    Cardiovascular:  Negative for chest pain and palpitations.   Gastrointestinal:  Negative for abdominal pain and vomiting.   Genitourinary:  Negative for dysuria and hematuria.   Musculoskeletal:  Negative for  "arthralgias and back pain.   Skin:  Negative for color change and rash.   Neurological:  Negative for seizures and syncope.   Psychiatric/Behavioral:  Positive for decreased concentration. Negative for confusion, sleep disturbance and suicidal ideas. The patient is not nervous/anxious.    All other systems reviewed and are negative.        Objective     /66 (BP Location: Left arm, Patient Position: Sitting, Cuff Size: Large)   Pulse 88   Temp 98.5 °F (36.9 °C) (Tympanic)   Resp 16   Ht 5' 11.77\" (1.823 m)   Wt (!) 143 kg (316 lb 3.2 oz)   SpO2 96%   BMI 43.16 kg/m²     Physical Exam  Vitals and nursing note reviewed.   Constitutional:       General: He is not in acute distress.     Appearance: Normal appearance.   HENT:      Head: Normocephalic and atraumatic.      Right Ear: Tympanic membrane and external ear normal.      Left Ear: Tympanic membrane and external ear normal.      Nose: Nose normal.      Mouth/Throat:      Mouth: Mucous membranes are moist.   Eyes:      Extraocular Movements: Extraocular movements intact.      Conjunctiva/sclera: Conjunctivae normal.      Pupils: Pupils are equal, round, and reactive to light.   Cardiovascular:      Rate and Rhythm: Normal rate and regular rhythm.      Pulses: Normal pulses.      Heart sounds: Normal heart sounds. No murmur heard.  Pulmonary:      Effort: Pulmonary effort is normal.      Breath sounds: Normal breath sounds. No wheezing, rhonchi or rales.   Abdominal:      General: Bowel sounds are normal.      Palpations: Abdomen is soft.      Tenderness: There is no abdominal tenderness. There is no guarding.   Musculoskeletal:         General: Normal range of motion.      Cervical back: Normal range of motion.      Right lower leg: No edema.      Left lower leg: No edema.   Lymphadenopathy:      Cervical: No cervical adenopathy.   Skin:     General: Skin is warm.      Capillary Refill: Capillary refill takes less than 2 seconds.   Neurological:      " General: No focal deficit present.      Mental Status: He is alert and oriented to person, place, and time.   Psychiatric:         Mood and Affect: Mood normal.         Behavior: Behavior normal.

## 2024-10-07 NOTE — PATIENT INSTRUCTIONS
"Patient Education     Exercise Band Exercises for the Shoulder   About this topic   Using an exercise band is one way to strengthen your muscles. You can use an exercise band at home, work, or when you travel. You can buy one at a sporting goods store or online and most cost less than 15 dollars. The color of the band lets you know how much tension it will give your muscles. The colors may differ slightly with each company that makes them. Ask your doctor or therapist what color band you should use.  General   Before starting with a program, ask your doctor if you are healthy enough to do these exercises. Your doctor may have you work with a  or physical therapist to make a safe exercise program to meet your needs.  Strengthening Exercises   Strengthening exercises keep your muscles firm and strong. Start by repeating each exercise 2 to 3 times. Work up to doing each exercise 10 times. Try to do the exercises 2 to 3 times each day. Do all exercises slowly.  Shoulder exercises ? Tie a knot in an exercise band. Secure the band in a door by shutting it in around waist level. Be sure to avoid the area next to the door handle as the door mechanism could tear the band. It is also a good idea to lock the door so no one accidently opens the door while you are working out. Wrap the band around one hand for a good . Stand away from the door enough to have slight tension in the band. As the exercises get easier, you may need to change to a heavier band. Moving closer to, or further away from, the point where the band is tied down will decrease or increase the tension in the band.  Internal rotations ? Face sideways with the arm holding the band closest to the door. Bend the elbow to 90 degrees or in an \"L\" position. Keeping your elbow by your side and bent, pull the band across your body. Bring it back to the starting position. Repeat with the other arm.  External rotations ? Face sideways with the arm holding the " "band farthest from the door. Bend the elbow to 90 degrees or in an \"L\" position. Keeping your elbow by your side and bent, pull the band away from your body. Bring it back to the starting position. Repeat with the other arm.  Adductions ? Face sideways with the arm holding the band closest to the door. Keep your elbow straight and pull the band across your body. Bring it back to the starting position. Repeat with the other arm.  Abductions ? Face sideways with the arm holding the band farthest from the door. Be sure that your thumb is facing upwards. Keep your elbow straight and pull the band out to the side and away from your body. Go up to shoulder level. Bring it back to the starting position. Repeat with the other arm.  Flexions ? Face away from the door. Keeping your elbow straight, pull the band straight out in front of you. Bring it back to the starting position. Repeat with the other arm.  Extensions ? Face towards the door. Keeping your elbow straight, pull the band straight backwards. Bring it back to the starting position. Repeat with the other arm.     What will the results be?   Stronger muscles  More toned arms  More shoulder range of motion  Greater ease doing arm activities  Helpful tips   Stay active and work out to keep your muscles strong and flexible.  Keep a healthy weight to avoid putting too much stress on your joints. Eat a healthy diet to keep your muscles healthy.  Be sure you do not hold your breath when exercising. This can raise your blood pressure. If you tend to hold your breath, try counting out loud when exercising. Breathe out when you are pulling on the band. Breathe in when you return the band to the starting position. If any exercise bothers you, stop right away.  Try walking and swinging your arms at an easy pace for a few minutes to warm up your muscles. Do this again after exercising.  Exercise may be slightly uncomfortable, but you should not have sharp pains. If you do get " "sharp pains, stop what you are doing. If the sharp pains continue, call your doctor.  Be sure to check the rubber tubing or band for signs of tears or weakness before using it.  Last Reviewed Date   2021-06-24  Consumer Information Use and Disclaimer   This generalized information is a limited summary of diagnosis, treatment, and/or medication information. It is not meant to be comprehensive and should be used as a tool to help the user understand and/or assess potential diagnostic and treatment options. It does NOT include all information about conditions, treatments, medications, side effects, or risks that may apply to a specific patient. It is not intended to be medical advice or a substitute for the medical advice, diagnosis, or treatment of a health care provider based on the health care provider's examination and assessment of a patient’s specific and unique circumstances. Patients must speak with a health care provider for complete information about their health, medical questions, and treatment options, including any risks or benefits regarding use of medications. This information does not endorse any treatments or medications as safe, effective, or approved for treating a specific patient. Up[employer], Inc[employer] and its affiliates disclaim any warranty or liability relating to this information or the use thereof. The use of this information is governed by the Terms of Use, available at https://www.[employer].com/en/know/clinical-effectiveness-terms   Copyright   Copyright © 2024 Up[employer]Date, Inc. and its affiliates and/or licensors. All rights reserved.      Patient Education     Routine physical for adults   The Basics   Written by the doctors and editors at [employer]   What is a physical? -- A physical is a routine visit, or \"check-up,\" with your doctor. You might also hear it called a \"wellness visit\" or \"preventive visit.\"  During each visit, the doctor will:   Ask about your physical and mental health   Ask " "about your habits, behaviors, and lifestyle   Do an exam   Give you vaccines if needed   Talk to you about any medicines you take   Give advice about your health   Answer your questions  Getting regular check-ups is an important part of taking care of your health. It can help your doctor find and treat any problems you have. But it's also important for preventing health problems.  A routine physical is different from a \"sick visit.\" A sick visit is when you see a doctor because of a health concern or problem. Since physicals are scheduled ahead of time, you can think about what you want to ask the doctor.  How often should I get a physical? -- It depends on your age and health. In general, for people age 21 years and older:   If you are younger than 50 years, you might be able to get a physical every 3 years.   If you are 50 years or older, your doctor might recommend a physical every year.  If you have an ongoing health condition, like diabetes or high blood pressure, your doctor will probably want to see you more often.  What happens during a physical? -- In general, each visit will include:   Physical exam - The doctor or nurse will check your height, weight, heart rate, and blood pressure. They will also look at your eyes and ears. They will ask about how you are feeling and whether you have any symptoms that bother you.   Medicines - It's a good idea to bring a list of all the medicines you take to each doctor visit. Your doctor will talk to you about your medicines and answer any questions. Tell them if you are having any side effects that bother you. You should also tell them if you are having trouble paying for any of your medicines.   Habits and behaviors - This includes:   Your diet   Your exercise habits   Whether you smoke, drink alcohol, or use drugs   Whether you are sexually active   Whether you feel safe at home  Your doctor will talk to you about things you can do to improve your health and lower " "your risk of health problems. They will also offer help and support. For example, if you want to quit smoking, they can give you advice and might prescribe medicines. If you want to improve your diet or get more physical activity, they can help you with this, too.   Lab tests, if needed - The tests you get will depend on your age and situation. For example, your doctor might want to check your:   Cholesterol   Blood sugar   Iron level   Vaccines - The recommended vaccines will depend on your age, health, and what vaccines you already had. Vaccines are very important because they can prevent certain serious or deadly infections.   Discussion of screening - \"Screening\" means checking for diseases or other health problems before they cause symptoms. Your doctor can recommend screening based on your age, risk, and preferences. This might include tests to check for:   Cancer, such as breast, prostate, cervical, ovarian, colorectal, prostate, lung, or skin cancer   Sexually transmitted infections, such as chlamydia and gonorrhea   Mental health conditions like depression and anxiety  Your doctor will talk to you about the different types of screening tests. They can help you decide which screenings to have. They can also explain what the results might mean.   Answering questions - The physical is a good time to ask the doctor or nurse questions about your health. If needed, they can refer you to other doctors or specialists, too.  Adults older than 65 years often need other care, too. As you get older, your doctor will talk to you about:   How to prevent falling at home   Hearing or vision tests   Memory testing   How to take your medicines safely   Making sure that you have the help and support you need at home  All topics are updated as new evidence becomes available and our peer review process is complete.  This topic retrieved from [employer] on: May 02, 2024.  Topic 482937 Version 1.0  Release: 32.4.3 - C32.122  © 2024 " UpToDate, Inc. and/or its affiliates. All rights reserved.  Consumer Information Use and Disclaimer   Disclaimer: This generalized information is a limited summary of diagnosis, treatment, and/or medication information. It is not meant to be comprehensive and should be used as a tool to help the user understand and/or assess potential diagnostic and treatment options. It does NOT include all information about conditions, treatments, medications, side effects, or risks that may apply to a specific patient. It is not intended to be medical advice or a substitute for the medical advice, diagnosis, or treatment of a health care provider based on the health care provider's examination and assessment of a patient's specific and unique circumstances. Patients must speak with a health care provider for complete information about their health, medical questions, and treatment options, including any risks or benefits regarding use of medications. This information does not endorse any treatments or medications as safe, effective, or approved for treating a specific patient. Up[employer]Date, Inc[employer] and its affiliates disclaim any warranty or liability relating to this information or the use thereof.The use of this information is governed by the Terms of Use, available at https://www.[employer]wer.com/en/know/clinical-effectiveness-terms. 2024© UpToDate, Inc. and its affiliates and/or licensors. All rights reserved.  Copyright   © 2024 [employer]Date, Inc. and/or its affiliates. All rights reserved.

## 2024-10-15 NOTE — ASSESSMENT & PLAN NOTE
Blood Pressure: 136/66    -Stable in office today.  Does monitor on occasion at home and always well-controlled.  Asymptomatic.  -Continue losartan 100 mg p.o. daily  -Continue dietary modifications -low-salt, low-carb, low sugar, whole food based eating.  -Discussed exercise recommendations -working toward 30 minutes of moderate intensity exercise 5 days a week.

## 2024-12-02 DIAGNOSIS — I10 ESSENTIAL HYPERTENSION: ICD-10-CM

## 2024-12-02 DIAGNOSIS — F90.0 ATTENTION DEFICIT HYPERACTIVITY DISORDER (ADHD), PREDOMINANTLY INATTENTIVE TYPE: ICD-10-CM

## 2024-12-02 RX ORDER — LISDEXAMFETAMINE DIMESYLATE 40 MG/1
40 CAPSULE ORAL EVERY MORNING
Qty: 90 CAPSULE | Refills: 0 | Status: SHIPPED | OUTPATIENT
Start: 2024-12-02 | End: 2025-03-02

## 2024-12-03 RX ORDER — LOSARTAN POTASSIUM 100 MG/1
100 TABLET ORAL DAILY
Qty: 90 TABLET | Refills: 1 | Status: SHIPPED | OUTPATIENT
Start: 2024-12-03

## 2024-12-09 ENCOUNTER — OFFICE VISIT (OUTPATIENT)
Age: 44
End: 2024-12-09
Payer: COMMERCIAL

## 2024-12-09 ENCOUNTER — DOCUMENTATION (OUTPATIENT)
Age: 44
End: 2024-12-09

## 2024-12-09 VITALS
TEMPERATURE: 95.6 F | OXYGEN SATURATION: 98 % | SYSTOLIC BLOOD PRESSURE: 138 MMHG | WEIGHT: 315 LBS | HEIGHT: 73 IN | DIASTOLIC BLOOD PRESSURE: 64 MMHG | BODY MASS INDEX: 41.75 KG/M2 | HEART RATE: 85 BPM

## 2024-12-09 DIAGNOSIS — G47.33 OSA (OBSTRUCTIVE SLEEP APNEA): Primary | ICD-10-CM

## 2024-12-09 DIAGNOSIS — F90.9 ATTENTION DEFICIT HYPERACTIVITY DISORDER (ADHD), UNSPECIFIED ADHD TYPE: ICD-10-CM

## 2024-12-09 PROCEDURE — 99214 OFFICE O/P EST MOD 30 MIN: CPT | Performed by: INTERNAL MEDICINE

## 2024-12-09 NOTE — LETTER
December 9, 2024     Enzo Bedolla,   [address] [address]    Patient: Joe Perez   YOB: 1980   Date of Visit: 12/9/2024       Dear Dr. Bedolla:    Thank you for referring Joe Perez to me for evaluation. Below are my notes for this consultation.    If you have questions, please do not hesitate to call me. I look forward to following your patient along with you.         Sincerely,        Oscar Franklin DO        CC: No Recipients    Oscar Franklin DO  12/9/2024  9:47 AM  Sign when Signing Visit  Progress Note - Sleep Medicine  Joe Perez 44 y.o. male MRN: [id number]       Impression & Plan:   43 y/o M with PMHx of JAMIE on autoPAP, HTN and ADHD on Vyvanse who comes in for follow up of JAMIE.  1.   JAMIE previously on autoCPAP but difficulty with tolerance.  Now on autoBIPAP with good clinical benefit and excellent compliance.  Residual AHI - 2.9.  However, still noted to have teeth grinding at night and awaiting an oral appliance.         -  Continue autoBIPAP with min EPAP of 8, PSV of 4, IPAP 25.  We did discuss increasing the min pressure to see if that helped the teeth grinding.         -  I have made a referral to see Dr. Palmer.  I would prefer he sees a sleep certified dentist if an oral appliance needs to be performed.  We discussed a switch in pressure and potential use of nasal device instead.         - An order for supplies was placed.  Currently using full face mask.       2.  Excessive daytime sleepiness despite CPAP.  Hx of ADHD      -  Management of JAMIE as below      -  Continue Vyvanse 40mg PO Daily for ADHD and hypersomnia.  This has also been helpful to help his daytime sleepiness.         -  Refer to psych for med management and talk therapy for ADHD.       3.  Weight loss - he has lost weight with wegovy.  However, GI side effects limited the use.  We did discuss the benefit of tirzepitide in JAMIE.   He is aware and will  contemplate if he wants to move forward with that or not.      ______________________________________________________________________    HPI:    Joe Perez presents today for follow-up for his JAMIE, daytime sleepiness, ADHD and weight.   He states that he has been doing very well.  He has been consistent with PAP and notes benefit form the PAP.  He feels it makes him feel more refreshed.  However, he was recently told by his dentist that he is grinding his teeth at night.  He is due to get an oral appliance.  We discussed the benefit of trying an oral appliance with nasal CPAP.  He has been doing well with ADHD.   He also stopped Wegovy due to GI side effects but is working on weight loss.          Review of Systems:  Review of Systems   Constitutional:  Positive for fatigue. Negative for appetite change.   Eyes: Negative.    Respiratory: Negative.     Cardiovascular: Negative.    Genitourinary: Negative.    Musculoskeletal: Negative.    Allergic/Immunologic: Negative.    Hematological: Negative.    Psychiatric/Behavioral: Negative.           Social history updates:  Social History     Tobacco Use   Smoking Status Never   Smokeless Tobacco Never     Social History     Socioeconomic History   • Marital status: /Civil Union     Spouse name: jeronimo    • Number of children: 1   • Years of education: Not on file   • Highest education level: Not on file   Occupational History   • Not on file   Tobacco Use   • Smoking status: Never   • Smokeless tobacco: Never   Vaping Use   • Vaping status: Never Used   Substance and Sexual Activity   • Alcohol use: Yes     Comment: rare   • Drug use: Never   • Sexual activity: Yes     Partners: Female   Other Topics Concern   • Not on file   Social History Narrative    Who lives in your home: Wife and son     What type of home do you live in: Single house    Age of your home: 1996     How long have you been living there: 2016    Type of heat: Forced hot air    Type of fuel: Oil  "   What type of kaur is in your bedroom: Hardwood floor    Do you have the following in or near your home:    Air products: Central air, Air , Humidifier and Dehumidifier    Pests: None    Pets: Dogs (Garo  and Lucy)     Are pets allowed in bedroom: Yes    Open fields, wooded areas nearby: Open fields and Wooded areas    Basement: Damp and Unfinished    Exposure to second hand smoke: No        Habits:    Caffeine: coffee 1 cup daily-soda and ice tea  minimally- hot tea when he's sick     Chocolate: 1x a month     Other:     Social Drivers of Health     Financial Resource Strain: Not on file   Food Insecurity: Not on file   Transportation Needs: Not on file   Physical Activity: Inactive (10/19/2020)    Exercise Vital Sign    • Days of Exercise per Week: 0 days    • Minutes of Exercise per Session: 0 min   Stress: Not on file   Social Connections: Not on file   Intimate Partner Violence: Not on file   Housing Stability: Not on file       PhysicalExamination:  Vitals:   /64 (BP Location: Left arm, Patient Position: Sitting, Cuff Size: Large)   Pulse 85   Temp (!) 95.6 °F (35.3 °C) (Tympanic Core) Comment: pt ear cold  Ht 6' 1\" (1.854 m)   Wt (!) 143 kg (315 lb 6.4 oz)   SpO2 98%   BMI 41.61 kg/m²   General: Pleasant, Awake alert and oriented x 3, conversant without conversational dyspnea, NAD, normal affect  HEENT:  PERRL, Sclera noninjected, nonicteric OU, Nares patent,  no craniofacial abnormalities, Mucous membranes, moist, no oral lesions, normal dentition  NECK: Trachea midline, no accessory muscle use, no stridor, no cervical or supraclavicular adenopathy, JVP not elevated  CARDIAC: Reg, single s1/S2, no m/r/g  PULM: CTA bilaterally no wheezing, rhonchi or rales  ABD: Normoactive bowel sounds, soft nontender, nondistended, no rebound, no rigidity, no guarding  EXT: No cyanosis, no clubbing, no edema, normal capillary refill  NEURO: no focal neurologic deficits, AAOx3, moving all " "extremities appropriately      Diagnostic Data:  Labs:  I personally reviewed the most recent laboratory data pertinent to today's visit  not applicable    I have personally reviewed pertinent lab results.  Lab Results   Component Value Date    WBC 6.33 09/30/2024    HGB 14.3 09/30/2024    HCT 42.7 09/30/2024    MCV 85 09/30/2024     09/30/2024     Lab Results   Component Value Date    GLUCOSE 122 02/10/2016    CALCIUM 9.0 09/30/2024    K 3.9 09/30/2024    CO2 27 09/30/2024     09/30/2024    BUN 15 09/30/2024    CREATININE 0.67 09/30/2024     No results found for: \"IGE\"  Lab Results   Component Value Date    ALT 46 09/30/2024    AST 35 09/30/2024    ALKPHOS 65 09/30/2024     Lab Results   Component Value Date    IRON 77 08/22/2020    TIBC 343 08/22/2020    FERRITIN 133 08/22/2020       Sleep studies:  CPAP titration study 8/19/20  Mr. Perez underwent titration of CPAP starting at 9 cc of H2O pressure. He demonstrated a normal sleep  latency and delayed REM latency arguing against hypersomnia. Sleep staging demonstrated increased REM sleep.  His respiratory events were well controlled. He had a few events on CPAP 9 so he was titrated up to 10 cc of H2O  pressure. He did well at that pressure. Therefore, I recommend a switch to autoCPAP 10-20 cc of H2O pressure  with heated humidification. TCO2 monitoring was performed, while awake his TCO2 was 42- 44. It increased  during REM sleep to 51- 53. He only had a brief episode of hypoxia but was otherwise well controlled.  He did demonstrate frequent limb movements (PLM index - 29.4). I would check iron and magnesium levels. I would consider a trial of Neurontin, Lyrica, Mirapex or Requip.     Compliance Data:  Type of CPAP:  autoPAP 10-20, mean pressure 11, max 15                                   Percent usage: 100%                                   Average time used: 5 hrs 24 mins                                   Residual AHI: 1.8     Compliance Data:  " 10/5/21 - 11/3/21                                  Type of CPAP:  autoPAP 10-20, mean 10.7, max 13.5                                   Average time used: 5 hrs 27 mins                                   Residual AHI: 1.8     Compliance Data: 1/8/22 - 2/6/22                                    Type of CPAP:  autoBIPAP with min EPAP 8, PSV 4, IPAP 25                                   Percent usage: 93%, 73% for > 4 hrs                                   Average time used: 5 hrs 11 minutes                                   Residual AHI: 1.8                                      Compliance Data: 10/8/22 - 11/6/22                                    Type of CPAP:  autoBIPAP with min EPAP 8, PSV 4, IPAP 25                                   Percent usage: 100%, 63% for > 4 hrs                                   Average time used: 4 hrs 22 minutes                                   Residual AHI: 1.0     Compliance Data: 9/13/23 - 10/12/23                                    Type of CPAP:  autoBIPAP with min EPAP 8, PSV 4, IPAP 25                                   Percent usage: 73%, 17% for > 4 hrs                                   Average time used: 2 hrs 58 minutes                                   Residual AHI: 1.4                          New Compliance Data: 11/6/24 - 12/5/24                                    Type of PAP:  autoBIPAP with min EPAP 8, PSV 4, IPAP 25                                   Percent usage: 97%, 87% for > 4 hrs                                   Average time used: 6 hrs 7 minutes                                   Residual AHI: 2.9           Oscar Franklin DO

## 2024-12-09 NOTE — PROGRESS NOTES
Progress Note - Sleep Medicine  Joe Perez 44 y.o. male MRN: [id number]       Impression & Plan:   45 y/o M with PMHx of JAMIE on autoPAP, HTN and ADHD on Vyvanse who comes in for follow up of JAMIE.  1.   JAMIE previously on autoCPAP but difficulty with tolerance.  Now on autoBIPAP with good clinical benefit and excellent compliance.  Residual AHI - 2.9.  However, still noted to have teeth grinding at night and awaiting an oral appliance.         -  Continue autoBIPAP with min EPAP of 8, PSV of 4, IPAP 25.  We did discuss increasing the min pressure to see if that helped the teeth grinding.         -  I have made a referral to see Dr. Palmer.  I would prefer he sees a sleep certified dentist if an oral appliance needs to be performed.  We discussed a switch in pressure and potential use of nasal device instead.         - An order for supplies was placed.  Currently using full face mask.       2.  Excessive daytime sleepiness despite CPAP.  Hx of ADHD      -  Management of JAMIE as below      -  Continue Vyvanse 40mg PO Daily for ADHD and hypersomnia.  This has also been helpful to help his daytime sleepiness.         -  Refer to psych for med management and talk therapy for ADHD.       3.  Weight loss - he has lost weight with wegovy.  However, GI side effects limited the use.  We did discuss the benefit of tirzepitide in JAMIE.   He is aware and will contemplate if he wants to move forward with that or not.      ______________________________________________________________________    HPI:    Joe Perez presents today for follow-up for his JAMIE, daytime sleepiness, ADHD and weight.   He states that he has been doing very well.  He has been consistent with PAP and notes benefit form the PAP.  He feels it makes him feel more refreshed.  However, he was recently told by his dentist that he is grinding his teeth at night.  He is due to get an oral appliance.  We discussed the benefit of trying an oral appliance with  nasal CPAP.  He has been doing well with ADHD.   He also stopped Wegovy due to GI side effects but is working on weight loss.          Review of Systems:  Review of Systems   Constitutional:  Positive for fatigue. Negative for appetite change.   Eyes: Negative.    Respiratory: Negative.     Cardiovascular: Negative.    Genitourinary: Negative.    Musculoskeletal: Negative.    Allergic/Immunologic: Negative.    Hematological: Negative.    Psychiatric/Behavioral: Negative.           Social history updates:  Social History     Tobacco Use   Smoking Status Never   Smokeless Tobacco Never     Social History     Socioeconomic History    Marital status: /Civil Union     Spouse name: jeronimo     Number of children: 1    Years of education: Not on file    Highest education level: Not on file   Occupational History    Not on file   Tobacco Use    Smoking status: Never    Smokeless tobacco: Never   Vaping Use    Vaping status: Never Used   Substance and Sexual Activity    Alcohol use: Yes     Comment: rare    Drug use: Never    Sexual activity: Yes     Partners: Female   Other Topics Concern    Not on file   Social History Narrative    Who lives in your home: Wife and son     What type of home do you live in: Single house    Age of your home: 1996     How long have you been living there: 2016    Type of heat: Forced hot air    Type of fuel: Oil    What type of kaur is in your bedroom: Hardwood floor    Do you have the following in or near your home:    Air products: Central air, Air , Humidifier and Dehumidifier    Pests: None    Pets: Dogs ([place]  and Lucy)     Are pets allowed in bedroom: Yes    Open fields, wooded areas nearby: Open fields and Wooded areas    Basement: Damp and Unfinished    Exposure to second hand smoke: No        Habits:    Caffeine: coffee 1 cup daily-soda and ice tea  minimally- hot tea when he's sick     Chocolate: 1x a month     Other:     Social Drivers of Health     Financial  "Resource Strain: Not on file   Food Insecurity: Not on file   Transportation Needs: Not on file   Physical Activity: Inactive (10/19/2020)    Exercise Vital Sign     Days of Exercise per Week: 0 days     Minutes of Exercise per Session: 0 min   Stress: Not on file   Social Connections: Not on file   Intimate Partner Violence: Not on file   Housing Stability: Not on file       PhysicalExamination:  Vitals:   /64 (BP Location: Left arm, Patient Position: Sitting, Cuff Size: Large)   Pulse 85   Temp (!) 95.6 °F (35.3 °C) (Tympanic Core) Comment: pt ear cold  Ht 6' 1\" (1.854 m)   Wt (!) 143 kg (315 lb 6.4 oz)   SpO2 98%   BMI 41.61 kg/m²   General: Pleasant, Awake alert and oriented x 3, conversant without conversational dyspnea, NAD, normal affect  HEENT:  PERRL, Sclera noninjected, nonicteric OU, Nares patent,  no craniofacial abnormalities, Mucous membranes, moist, no oral lesions, normal dentition  NECK: Trachea midline, no accessory muscle use, no stridor, no cervical or supraclavicular adenopathy, JVP not elevated  CARDIAC: Reg, single s1/S2, no m/r/g  PULM: CTA bilaterally no wheezing, rhonchi or rales  ABD: Normoactive bowel sounds, soft nontender, nondistended, no rebound, no rigidity, no guarding  EXT: No cyanosis, no clubbing, no edema, normal capillary refill  NEURO: no focal neurologic deficits, AAOx3, moving all extremities appropriately      Diagnostic Data:  Labs:  I personally reviewed the most recent laboratory data pertinent to today's visit  not applicable    I have personally reviewed pertinent lab results.  Lab Results   Component Value Date    WBC 6.33 09/30/2024    HGB 14.3 09/30/2024    HCT 42.7 09/30/2024    MCV 85 09/30/2024     09/30/2024     Lab Results   Component Value Date    GLUCOSE 122 02/10/2016    CALCIUM 9.0 09/30/2024    K 3.9 09/30/2024    CO2 27 09/30/2024     09/30/2024    BUN 15 09/30/2024    CREATININE 0.67 09/30/2024     No results found for: " "\"IGE\"  Lab Results   Component Value Date    ALT 46 09/30/2024    AST 35 09/30/2024    ALKPHOS 65 09/30/2024     Lab Results   Component Value Date    IRON 77 08/22/2020    TIBC 343 08/22/2020    FERRITIN 133 08/22/2020       Sleep studies:  CPAP titration study 8/19/20  Mr. Perez underwent titration of CPAP starting at 9 cc of H2O pressure. He demonstrated a normal sleep  latency and delayed REM latency arguing against hypersomnia. Sleep staging demonstrated increased REM sleep.  His respiratory events were well controlled. He had a few events on CPAP 9 so he was titrated up to 10 cc of H2O  pressure. He did well at that pressure. Therefore, I recommend a switch to autoCPAP 10-20 cc of H2O pressure  with heated humidification. TCO2 monitoring was performed, while awake his TCO2 was 42- 44. It increased  during REM sleep to 51- 53. He only had a brief episode of hypoxia but was otherwise well controlled.  He did demonstrate frequent limb movements (PLM index - 29.4). I would check iron and magnesium levels. I would consider a trial of Neurontin, Lyrica, Mirapex or Requip.     Compliance Data:  Type of CPAP:  autoPAP 10-20, mean pressure 11, max 15                                   Percent usage: 100%                                   Average time used: 5 hrs 24 mins                                   Residual AHI: 1.8     Compliance Data:  10/5/21 - 11/3/21                                  Type of CPAP:  autoPAP 10-20, mean 10.7, max 13.5                                   Average time used: 5 hrs 27 mins                                   Residual AHI: 1.8     Compliance Data: 1/8/22 - 2/6/22                                    Type of CPAP:  autoBIPAP with min EPAP 8, PSV 4, IPAP 25                                   Percent usage: 93%, 73% for > 4 hrs                                   Average time used: 5 hrs 11 minutes                                   Residual AHI: 1.8                                    "   Compliance Data: 10/8/22 - 11/6/22                                    Type of CPAP:  autoBIPAP with min EPAP 8, PSV 4, IPAP 25                                   Percent usage: 100%, 63% for > 4 hrs                                   Average time used: 4 hrs 22 minutes                                   Residual AHI: 1.0     Compliance Data: 9/13/23 - 10/12/23                                    Type of CPAP:  autoBIPAP with min EPAP 8, PSV 4, IPAP 25                                   Percent usage: 73%, 17% for > 4 hrs                                   Average time used: 2 hrs 58 minutes                                   Residual AHI: 1.4                          New Compliance Data: 11/6/24 - 12/5/24                                    Type of PAP:  autoBIPAP with min EPAP 8, PSV 4, IPAP 25                                   Percent usage: 97%, 87% for > 4 hrs                                   Average time used: 6 hrs 7 minutes                                   Residual AHI: 2.9           Oscar Franklin DO

## 2024-12-12 LAB

## 2024-12-17 LAB

## 2024-12-18 LAB

## 2024-12-26 ENCOUNTER — TELEPHONE (OUTPATIENT)
Age: 44
End: 2024-12-26

## 2024-12-26 NOTE — TELEPHONE ENCOUNTER
"Behavioral Health Outpatient Intake Questions    Referred By   : PCP    Please advise interviewee that they need to answer all questions truthfully to allow for best care, and any misrepresentations of information may affect their ability to be seen at this clinic   => Was this discussed? Yes       Behavioral Health Outpatient Intake History -     Presenting Problem (in patient's own words):   ADHD    Are there any communication barriers for this patient?     No                                                   Are you taking any psychiatric medications? Yes   Vyvanse - PCP    Has the Patient previously received outpatient Talk Therapy or Medication Management from [facility]  No       Has the Patient abused alcohol or other substances in the last 6 months ? No        Legal History-     Is this treatment court ordered? No       Has the Patient been convicted of a felony?  NO      ACCEPTED as a patient Yes  If \"Yes\" Appointment Date: 01/07 Dr Latham VV 10am    Referred Elsewhere? No  If “Yes” - (Where? Ex: [facility], [facility], [facility], [facility], etc.)       Name of Insurance Co:[facility] [employer]  Insurance ID [id number]   Insurance Phone #  If ins is primary or secondary? Primary  [facility] - Secondary  "

## 2024-12-31 ENCOUNTER — TELEPHONE (OUTPATIENT)
Dept: PSYCHIATRY | Facility: CLINIC | Age: 44
End: 2024-12-31

## 2024-12-31 NOTE — TELEPHONE ENCOUNTER
One week follow up call for New Patient appointment with   Inocencio Scruggs MD   on 01/07/2025 was made on 12/31/2024. Writer informed patient of New Patient paperwork needing to be completed 5 days prior to the appointment. Writer confirmed paperwork has been sent via [facility].    Appointment was made on: 12/26/2024      
Spontaneous, unlabored and symmetrical

## 2025-01-06 ENCOUNTER — TELEPHONE (OUTPATIENT)
Dept: PSYCHIATRY | Facility: CLINIC | Age: 45
End: 2025-01-06

## 2025-01-06 NOTE — TELEPHONE ENCOUNTER
Spoke with patient regarding the consents needed for his virtual visit with Dr. Scruggs on 1/7/2025. Patient will have them done prior to visit

## 2025-01-07 ENCOUNTER — TELEMEDICINE (OUTPATIENT)
Dept: PSYCHIATRY | Facility: CLINIC | Age: 45
End: 2025-01-07
Payer: COMMERCIAL

## 2025-01-07 ENCOUNTER — PATIENT MESSAGE (OUTPATIENT)
Age: 45
End: 2025-01-07

## 2025-01-07 DIAGNOSIS — F42.9 OBSESSIVE-COMPULSIVE DISORDER, UNSPECIFIED TYPE: ICD-10-CM

## 2025-01-07 DIAGNOSIS — L60.0 INGROWN TOENAIL: Primary | ICD-10-CM

## 2025-01-07 DIAGNOSIS — F90.0 ATTENTION DEFICIT HYPERACTIVITY DISORDER (ADHD), PREDOMINANTLY INATTENTIVE TYPE: Primary | ICD-10-CM

## 2025-01-07 PROCEDURE — 90792 PSYCH DIAG EVAL W/MED SRVCS: CPT | Performed by: STUDENT IN AN ORGANIZED HEALTH CARE EDUCATION/TRAINING PROGRAM

## 2025-01-07 RX ORDER — LISDEXAMFETAMINE DIMESYLATE 50 MG/1
50 CAPSULE ORAL EVERY MORNING
Qty: 30 CAPSULE | Refills: 0 | Status: SHIPPED | OUTPATIENT
Start: 2025-01-07 | End: 2025-01-16

## 2025-01-07 NOTE — ASSESSMENT & PLAN NOTE
Patient does have a history of ADHD with symptoms only partially controlled at this time.  He continues to have areas of executive dysfunction including organizing, planning, and maintaining his attention and focus.  He has had difficulty with tasks both at home and at work.  He has had benefit from Vyvanse historically with further benefit at higher dose previously.    Increase Vyvanse to 50 mg daily.  Referral for therapy for improvement in executive functioning.

## 2025-01-07 NOTE — PSYCH
PSYCHIATRIC EVALUATION     [facility] - [facility]    Virtual Visit    Patient is located at Home in the following state in which I hold an active license [place].    Provider located in [place].    The patient was identified by name and date of birth. Joe Perez was informed that this is a telemedicine visit and that the visit is being conducted through the Epic Embedded platform. He agrees to proceed. My office door was closed. No one else was in the room. He acknowledged consent and understanding of privacy and security of the video platform. The patient has agreed to participate and understands they can discontinue the visit at any time. Patient is aware this is a billable service.     Name and Date of Birth:  Joe Perez 44 y.o. 1980 MRN: [id number]    Insurance: Payor: [employer] / Plan: [employer] / Product Type: PPO Commercial /      Date of Visit: January 7, 2025    Reason for visit:   Chief Complaint   Patient presents with    ADHD    Establish Care       Assessment & Plan  Attention deficit hyperactivity disorder (ADHD), predominantly inattentive type  Patient does have a history of ADHD with symptoms only partially controlled at this time.  He continues to have areas of executive dysfunction including organizing, planning, and maintaining his attention and focus.  He has had difficulty with tasks both at home and at work.  He has had benefit from Vyvanse historically with further benefit at higher dose previously.    Increase Vyvanse to 50 mg daily.  Referral for therapy for improvement in executive functioning.    Orders:    Ambulatory referral to Psych Services    lisdexamfetamine (Vyvanse) 50 MG capsule; Take 1 capsule (50 mg total) by mouth every morning Max Daily Amount: 50 mg    Obsessive-compulsive disorder, unspecified type  Patient has a history of obsessive-compulsive disorder with significant obsessions and compulsive behaviors that he  experienced significantly more in childhood and adolescence.  This has improved significantly with medication management and with therapy.  Patient does continue to have some intrusive thoughts at this time, but has better control over this.    Continue Lexapro 10 mg daily.              Treatment Recommendations/Precautions:    Increasing Vyvanse as above and will continue Lexapro 10 mg daily.  Aware of 24 hour and weekend coverage for urgent situations accessed by calling [facility] main practice number  Referral for individual psychotherapy  I am scheduling this patient out for greater than 3 months: No    Medications Risks/Benefits:      Risks, Benefits And Possible Side Effects Of Medications:    Risks, benefits, and possible side effects of medications explained to Joe and he verbalizes understanding and agreement for treatment.    Controlled Medication Discussion:     Joe has been filling controlled prescriptions on time as prescribed according to [place] Prescription Drug Monitoring Program    HPI     Joe is a 44 y.o. male with a history of OCD and ADHD who presents for psychiatric evaluation due to problems with attention span and for psychiatric medication management.    This patient presents as pleasant and cooperative throughout encounter.  He reports that he is presenting to establish care for ADHD.  Patient reports that he was initially diagnosed with obsessive-compulsive disorder around age 14-15.  He states at that time, he was experiencing obsessive thoughts that had become very distressing and had experienced compulsions to turn a light switch on and off and to wash his hands to the point of causing harm to his skin.  Patient states that this had continued to escalate, though did improve with medication management and with therapy.  He states that he was later diagnosed with ADHD in his mid 20s.  He reports initially having been on Adderall but then was switched to  Vyvanse.  Patient reports that he has chronically struggled with areas of executive functioning including his focus, attention, organizing, planning, and being able to complete projects.  Patient notes that he struggles with following up on things when he loses sight of what he is working on.  He notes that he becomes very distressed when to any tasks to build up and he has difficulty initiating work.  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  He reports that he frequently starts projects and does not finish them.    Patient reports that he has not historically struggled with mood much.  He states that he does get irritable at times but denies any persistent periods of low mood, decreased interest, low motivation, hopelessness, guilt.  Patient denies any history of suicidal thoughts, nonsuicidal self-injury, or thoughts to harm others.  He denies any history consistent with marlin including decreased need for sleep with excessively elevated mood or energy.  Patient reports that, outside of his intrusive thoughts, he has not been a particularly anxious person.  He denies any struggles with excessive worry about various topics.  He denies any history of panic attacks.  Patient denies any auditory or visual hallucinations and does not appear to be responding to internal stimuli.  He does not demonstrate any paranoid or delusional thought content during interview.     Patient reports that he receives very good support at home.  He lives with his wife and 2 sons and states that his ADHD symptoms started to steadily worsening since the birth of his oldest son 5 years ago.  He reports that this has been steadily worsening to this point.  He denies access to firearms.  He as above denies any SI or HI.  Spent time discussing medications.  He reports that Lexapro has historically worked very well for controlling his intrusive thoughts.  He also has found Vyvanse to  be generally helpful, though was more helpful than it was previously at 50 mg.  Discussed plan to titrate Vyvanse to 50 mg again and monitor for any further need to increase Lexapro or further increase in Vyvanse.    HPI ROS Appetite Changes and Sleep:     He reports normal sleep, fluctuating appetite, low energy    Current Rating Scores:     Current PHQ-9   PHQ-2/9 Depression Screening    Little interest or pleasure in doing things: 0 - not at all  Feeling down, depressed, or hopeless: 0 - not at all  Trouble falling or staying asleep, or sleeping too much: 1 - several days  Feeling tired or having little energy: 3 - nearly every day  Poor appetite or overeatin - more than half the days  Feeling bad about yourself - or that you are a failure or have let yourself or your family down: 0 - not at all  Trouble concentrating on things, such as reading the newspaper or watching television: 2 - more than half the days  Moving or speaking so slowly that other people could have noticed. Or the opposite - being so fidgety or restless that you have been moving around a lot more than usual: 0 - not at all  Thoughts that you would be better off dead, or of hurting yourself in some way: 0 - not at all  PHQ-9 Score: 8  PHQ-9 Interpretation: Mild depression       Current WALTER-7 is   WALTER-7 Flowsheet Screening      Flowsheet Row Most Recent Value   Over the last two weeks, how often have you been bothered by the following problems?     Feeling nervous, anxious, or on edge 0   Not being able to stop or control worrying 0   Worrying too much about different things 0   Trouble relaxing  0   Being so restless that it's hard to sit still 0   Becoming easily annoyed or irritable  0   Feeling afraid as if something awful might happen 0   How difficult have these problems made it for you to do your work, take care of things at home, or get along with other people?  Not difficult at all   WALTER Score  0        .    Psychiatric Review Of  Systems:    Sleep changes: yes  Appetite changes: yes  Weight changes: no  Energy/anergy: decreased  Interest/pleasure/anhedonia: no  Somatic symptoms: no  Anxiety/panic: no  [place]: no  Guilty/hopeless: no  Self injurious behavior/risky behavior: no  Suicidal ideation: no  Homicidal ideation: no  Auditory hallucinations: no  Visual hallucinations: no  Other hallucinations: no  Delusional thinking: no  Eating disorder history: no  Obsessive/compulsive symptoms: yes, per HPI. Under improved control.    Review Of Systems:    Mood euthymic   Behavior normal    Thought Content normal   General normal    Personality normal   Other Psych Symptoms decreased concentration and decreased attention   Constitutional negative   ENT negative   Cardiovascular negative   Respiratory negative   Gastrointestinal negative   Genitourinary negative   Musculoskeletal negative   Integumentary negative   Neurological negative   Endocrine negative   Other Symptoms none, all other systems are negative       Past Psychiatric History:     Past Inpatient Psychiatric Treatment:   No history of past inpatient psychiatric admissions  Past Outpatient Psychiatric Treatment:    Past outpatient psychiatric treatment many years ago, last time was in 2005.  Past Suicide Attempts: no  Past Violent Behavior: no  Past Psychiatric Medication Trials: Prozac, Lexapro, Buspar, Adderall, and Vyvanse    Traumatic History:     Abuse: no history of physical or sexual abuse  Other Traumatic Events: none     Family Psychiatric History:     Patient reports that his paternal aunt had depression. He reports possible bipolar disorder and depression in his father's extended family. He denies any known family history of substance abuse. He reports two people on his father's side of the family had attempted suicide with one completed suicide.    Family History   Problem Relation Age of Onset    Hypertension Mother     Diabetes Mother         pre-diabetic     Heart attack  Father     Diabetes Father     Hypertension Father     Obesity Father         hx LRYGB    Heart disease Father         hx MI age 40    Arthritis Family     Cancer Family     Cancer Paternal Grandmother         hx bladder cancer    Heart attack Paternal Grandfather     No Known Problems Son     Stroke Neg Hx     Thyroid disease Neg Hx        Substance Use History:    Alcohol use:  sporadically  Recreational drug use:   Cocaine:  denies use  Heroin:  denies use  Marijuana:  denies use  Other drugs: denies use   Longest clean time: not applicable  History of Inpatient/Outpatient rehabilitation program: no  Smoking history: denies use    Social History     Substance and Sexual Activity   Alcohol Use Yes    Comment: rare     Social History     Substance and Sexual Activity   Drug Use Never       Social History:    Education: high school diploma/GED and vocational school  Learning Disabilities: dyslexia  Marital History:   Children: 2 sons  Living Arrangement: lives in home with wife, sons, and mother in law stays two days out of the week  Occupational History: works as an   Functioning Relationships: good support system  Legal History: none   History: None    Social History     Socioeconomic History    Marital status: /Civil Union     Spouse name: jeronimo     Number of children: 1    Years of education: Not on file    Highest education level: Not on file   Occupational History    Not on file   Tobacco Use    Smoking status: Never    Smokeless tobacco: Never   Vaping Use    Vaping status: Never Used   Substance and Sexual Activity    Alcohol use: Yes     Comment: rare    Drug use: Never    Sexual activity: Yes     Partners: Female   Other Topics Concern    Not on file   Social History Narrative    Who lives in your home: Wife and son     What type of home do you live in: Single house    Age of your home: 1994     How long have you been living there: 2016    Type of heat: Forced hot  air    Type of fuel: Oil    What type of kaur is in your bedroom: Hardwood floor    Do you have the following in or near your home:    Air products: Central air, Air , Humidifier and Dehumidifier    Pests: None    Pets: Dogs (Garo  and Lucy)     Are pets allowed in bedroom: Yes    Open fields, wooded areas nearby: Open fields and Wooded areas    Basement: Damp and Unfinished    Exposure to second hand smoke: No        Habits:    Caffeine: coffee 1 cup daily-soda and ice tea  minimally- hot tea when he's sick     Chocolate: 1x a month     Other:     Social Drivers of Health     Financial Resource Strain: Not on file   Food Insecurity: Not on file   Transportation Needs: Not on file   Physical Activity: Inactive (10/19/2020)    Exercise Vital Sign     Days of Exercise per Week: 0 days     Minutes of Exercise per Session: 0 min   Stress: Not on file   Social Connections: Not on file   Intimate Partner Violence: Not on file   Housing Stability: Not on file       Past Medical History:    Past Medical History:   Diagnosis Date    ADHD (attention deficit hyperactivity disorder)     CPAP (continuous positive airway pressure) dependence     Hypersomnolence     Hypertension     Hypoxia     Infrapatellar bursitis of right knee     Mononucleosis     Mycobacterial infectious disease     JAMIE (obstructive sleep apnea)     Pneumonia     Right upper lobe pneumonia 10/7/2020    Varicella         Past Surgical History:   Procedure Laterality Date    BRONCHOSCOPY      CARPAL TUNNEL RELEASE Bilateral     CIRCUMCISION      EPIDURAL BLOCK INJECTION Left 11/30/2022    Procedure: L4-L5, L5-S1  TRANSFORAMINAL epidural steroid injection ([id number]);  Surgeon: Juan Greer DO;  Location: [facility] MAIN OR;  Service: Pain Management     LUMBAR EPIDURAL INJECTION N/A 2/24/2023    Procedure: L3 L4  LUMBAR epdirual steroid injection ([id number]);  Surgeon: Juan Greer DO;  Location: [facility] MAIN OR;  Service: Pain Management      TONSILLECTOMY      WISDOM TOOTH EXTRACTION      X1      Allergies   Allergen Reactions    Olmesartan-Amlodipine-Hctz Edema    Terazosin Edema       Current Outpatient Medications:    Current Outpatient Medications   Medication Sig Dispense Refill    lisdexamfetamine (Vyvanse) 50 MG capsule Take 1 capsule (50 mg total) by mouth every morning Max Daily Amount: 50 mg 30 capsule 0    escitalopram (LEXAPRO) 10 mg tablet Take 1 tablet (10 mg total) by mouth daily 90 tablet 0    fexofenadine (ALLEGRA) 180 MG tablet Take 180 mg by mouth as needed        losartan (COZAAR) 100 MG tablet Take 1 tablet (100 mg total) by mouth daily 90 tablet 1    VITAMIN D PO Take by mouth daily       No current facility-administered medications for this visit.       History Review:    The following portions of the patient's history were reviewed and updated as appropriate: allergies, current medications, past family history, past medical history, past social history, past surgical history, and problem list.    OBJECTIVE:    Vital signs in last 24 hours:    There were no vitals filed for this visit.     Mental Status Evaluation:    Appearance age appropriate, casually dressed, dressed appropriately   Behavior pleasant, cooperative, calm   Speech normal rate, normal volume, normal pitch   Mood euthymic   Affect normal range and intensity, appropriate   Thought Processes organized, goal directed   Associations intact associations   Thought Content no overt delusions   Perceptual Disturbances: no auditory hallucinations, no visual hallucinations   Abnormal Thoughts  Risk Potential Suicidal ideation - None  Homicidal ideation - None  Potential for aggression - No   Orientation oriented to person, place, time/date, and situation   Memory recent and remote memory grossly intact   Consciousness alert and awake   Attention Span Concentration Span attention span and concentration are age appropriate   Intellect appears to be of average intelligence    Insight intact   Judgement intact   Muscle Strength and  Gait unable to assess today due to virtual visit   Motor Activity no abnormal movements   Language no difficulty naming common objects, no difficulty repeating a phrase, no difficulty writing a sentence   Fund of Knowledge adequate knowledge of current events  adequate fund of knowledge regarding past history  adequate fund of knowledge regarding vocabulary    Pain none   Pain Scale 0       Laboratory Results: I have personally reviewed all pertinent laboratory/tests results    Recent Labs (last 6 months):   Orders Only on 12/09/2024   Component Date Value    Supplier Name 12/09/2024 [employer] [facility]upp[facility]     Supplier Phone Number 12/09/2024 [phone]     Order Status 12/09/2024 Processing     Delivery Request Date 12/09/2024 12/09/2024     Item Description 12/09/2024 CPAP and BiLevel Resupply Package, Full Face     Item Description 12/09/2024 Disposable PAP Filter, 2 per 1 month     Item Description 12/09/2024 Non-Disposable PAP Filter, 1 per 6 months     Item Description 12/09/2024 PAP Machine Tubing, Heated, 1 per 3 months     Item Description 12/09/2024 Humidifier Water Chamber, 1 per 6 months     Item Description 12/09/2024 PAP Headgear, 1 per 6 months     Item Description 12/09/2024 PAP Mask, Fit to Comfort, Full Face, 1 per 3 months     Item Description 12/09/2024 PAP Mask Interface Cushion, Full Face, 1 per 1 month     Item Description 12/09/2024 PAP Chinstrap, 1 per 6 months    Office Visit on 12/09/2024   Component Date Value    Supplier Name 12/12/2024 [employer] [facility]     Supplier Phone Number 12/12/2024 [phone]     Order Status 12/12/2024 Processing     Delivery Request Date 12/12/2024 12/12/2024     Item Description 12/12/2024 CPAP and BiLevel Resupply Package, Full Face     Item Description 12/12/2024 Disposable PAP Filter, 2 per 1 month     Item Description 12/12/2024 Non-Disposable PAP Filter, 1 per 6 months     Item  Description 12/12/2024 PAP Machine Tubing, Heated, 1 per 3 months     Item Description 12/12/2024 Humidifier Water Chamber, 1 per 6 months     Item Description 12/12/2024 PAP Headgear, 1 per 6 months     Item Description 12/12/2024 PAP Mask, Fit to Comfort, Full Face, 1 per 3 months    Lab on 09/30/2024   Component Date Value    Sodium 09/30/2024 139     Potassium 09/30/2024 3.9     Chloride 09/30/2024 104     CO2 09/30/2024 27     ANION GAP 09/30/2024 8     BUN 09/30/2024 15     Creatinine 09/30/2024 0.67     Glucose, Fasting 09/30/2024 95     Calcium 09/30/2024 9.0     AST 09/30/2024 35     ALT 09/30/2024 46     Alkaline Phosphatase 09/30/2024 65     Total Protein 09/30/2024 7.2     Albumin 09/30/2024 4.3     Total Bilirubin 09/30/2024 0.45     eGFR 09/30/2024 119     WBC 09/30/2024 6.33     RBC 09/30/2024 5.02     Hemoglobin 09/30/2024 14.3     Hematocrit 09/30/2024 42.7     MCV 09/30/2024 85     MCH 09/30/2024 28.5     MCHC 09/30/2024 33.5     RDW 09/30/2024 12.8     MPV 09/30/2024 9.7     Platelets 09/30/2024 246     nRBC 09/30/2024 0     Segmented % 09/30/2024 42 (L)     Immature Grans % 09/30/2024 1     Lymphocytes % 09/30/2024 42     Monocytes % 09/30/2024 10     Eosinophils Relative 09/30/2024 4     Basophils Relative 09/30/2024 1     Absolute Neutrophils 09/30/2024 2.63     Absolute Immature Grans 09/30/2024 0.03     Absolute Lymphocytes 09/30/2024 2.72     Absolute Monocytes 09/30/2024 0.66     Eosinophils Absolute 09/30/2024 0.25     Basophils Absolute 09/30/2024 0.04     Cholesterol 09/30/2024 141     Triglycerides 09/30/2024 193 (H)     HDL, Direct 09/30/2024 29 (L)     LDL Calculated 09/30/2024 73    Office Visit on 09/30/2024   Component Date Value    LEUKOCYTE ESTERASE,UA 09/30/2024 -     NITRITE,UA 09/30/2024 -     SL AMB POCT UROBILINOGEN 09/30/2024 0.2     POCT URINE PROTEIN 09/30/2024 trace      PH,UA 09/30/2024 6.0     BLOOD,UA 09/30/2024 -     SPECIFIC GRAVITY,UA 09/30/2024 1.025      KETONES, 09/30/2024 -     BILIRUBIN,UA 09/30/2024 -     GLUCOSE, UA 09/30/2024 -      COLOR,UA 09/30/2024 yellow     CLARITY,UA 09/30/2024 clear        Suicide/Homicide Risk Assessment:    Risk of Harm to Self:  The following ratings are based on assessment at the time of the interview and review of records  Demographic risk factors include:   Historical Risk Factors include: none  Recent Specific Risk Factors include: none  Protective Factors: no current suicidal ideation, ability to adapt to change, able to manage anger well, access to mental health treatment, being a parent, being , compliant with medications, compliant with mental health treatment, effective coping skills, effective decision-making skills, having a desire to be alive, resiliency, responsibilities and duties to others, stable living environment, stable job, sense of personal control, supportive family, supportive friends  Weapons: none. The following steps have been taken to ensure weapons are properly secured: not applicable  Based on today's assessment, Joe presents the following risk of harm to self: low    Risk of Harm to Others:  The following ratings are based on assessment at the time of the interview and review of records  Demographic Risk Factors include: male.  Historical Risk Factors include: none.  Recent Specific Risk Factors include: none.  Protective Factors: no current homicidal ideation, ability to adapt to change, able to manage anger well, access to mental health treatment, being a parent, being , compliant with medications, compliant with mental health treatment, effective coping skills, effective decision-making skills, responsibilities and duties to others, safe and stable living environment, sense of personal control, supportive family, supportive friends  Weapons: none. The following steps have been taken to ensure weapons are properly secured: not applicable  Based on today's assessment, Joe  presents the following risk of harm to others: low    The following interventions are recommended: continue medication management, referral for psychotherapy    Treatment Plan:    Completed and signed during the session: Yes - Treatment Plan done but not signed at time of office visit due to:  Plan reviewed by video and verbal consent given due to virtual visit. Treatment Plan sent to patient via [employer] for signature.    This note was not shared with the patient due to reasonable likelihood of causing patient harm    Depression Follow-up Plan Completed: Not applicable    Visit Time    Visit Start Time: 10:10 AM  Visit Stop Time: 11:00 AM  Total Visit Duration:  50 minutes    Inocencio Scruggs MD 01/07/25

## 2025-01-07 NOTE — BH CRISIS PLAN
Client Name: Joe Perez       Client YOB: 1980    Luis ManuelRon Safety Plan      Creation Date: 1/7/25 Update Date: 1/7/25   Created By: Inocencio Scruggs MD Last Updated By: Inocencio Scruggs MD      Step 1: Warning Signs:   Warning Signs   Isolating self   Persistent and severe unhappiness            Step 2: Internal Coping Strategies:   Internal Coping Strategies   Working on a project/hobby   Watching youtube videos            Step 3: People and social settings that provide distraction:   Name Contact Information   Jeet (friend) Phone   Leonides (friend) Phone    Places   [employer]/other retail           Step 4: People whom I can ask for help during a crisis:      Name Contact Information    Bianca (wife) In home or phone      Step 5: Professionals or agencies I can contact during a crisis:      Clinican/Agency Name Phone Emergency Contact    [facility] [phone]       Local Emergency Department Emergency Department Phone Emergency Department Address    [facility] [phone]         Crisis Phone Numbers:   Suicide Prevention Lifeline: Call or Text  460 Crisis Text Line: Text HOME to 982-429   Please note: Some [facility] do not have a separate number for Child/Adolescent specific crisis. If your county is not listed under Child/Adolescent, please call the adult number for your county      Adult Crisis Numbers: Child/Adolescent Crisis Numbers   [facility]: [phone] [facility]: [phone]   [facility]: [phone] [facility]: [phone]   [facility]: [phone] [place]: [phone]   [facility]: [phone] [place]: [phone]   [place]: [phone]   [facility]: [phone]   [facility]: [phone]   [facility]: [phone] (daytime) [phone] (after hours, weekends, holidays)      Step 6: Making the environment safer (plan for lethal  means safety):   Patient did not identify any lethal methods: Yes     Optional: What is most important to me and worth living for?   Providing for family     Xavi Safety Plan. Marcela Issa and Benny Velasquez. Used with permission of the authors.

## 2025-01-07 NOTE — ASSESSMENT & PLAN NOTE
Patient has a history of obsessive-compulsive disorder with significant obsessions and compulsive behaviors that he experienced significantly more in childhood and adolescence.  This has improved significantly with medication management and with therapy.  Patient does continue to have some intrusive thoughts at this time, but has better control over this.    Continue Lexapro 10 mg daily.

## 2025-01-09 ENCOUNTER — OFFICE VISIT (OUTPATIENT)
Age: 45
End: 2025-01-09
Payer: COMMERCIAL

## 2025-01-09 VITALS — BODY MASS INDEX: 41.75 KG/M2 | WEIGHT: 315 LBS | HEIGHT: 73 IN

## 2025-01-09 DIAGNOSIS — L60.0 INGROWN TOENAIL: ICD-10-CM

## 2025-01-09 PROCEDURE — 11730 AVULSION NAIL PLATE SIMPLE 1: CPT | Performed by: STUDENT IN AN ORGANIZED HEALTH CARE EDUCATION/TRAINING PROGRAM

## 2025-01-09 PROCEDURE — 99203 OFFICE O/P NEW LOW 30 MIN: CPT | Performed by: STUDENT IN AN ORGANIZED HEALTH CARE EDUCATION/TRAINING PROGRAM

## 2025-01-09 NOTE — PROGRESS NOTES
"[facility] Podiatric Medicine and Surgery Office Visit    ASSESSMENT     Diagnoses and all orders for this visit:    Ingrown toenail  -     Ambulatory Referral to Podiatry         Problem List Items Addressed This Visit    None  Visit Diagnoses         Ingrown toenail              PLAN  -Nail avulsion performed as below:  -Nail bed was dressed with bacitracin, DSD, 1 inch Coban  -Patient instructed to take bandage off in 24 hours, wash area lightly with warm soap and water, dry area thoroughly, apply bacitracin and Band-Aid daily x10 days.  -Patient instructed to call our office for an earlier appointment should any extreme pain, redness, swelling, purulent drainage present.    Nail removal    Date/Time: 1/9/2025 1:45 PM    Performed by: Bishop Mcdonald DPM  Authorized by: Bishop Mcdonald DPM    Patient location:  Clinic  Indications / Diagnosis:  Ingrown toenail  Universal Protocol:  procedure performed by consultantConsent: Verbal consent obtained.  Risks and benefits: risks, benefits and alternatives were discussed  Consent given by: patient  Time out: Immediately prior to procedure a \"time out\" was called to verify the correct patient, procedure, equipment, support staff and site/side marked as required.  Patient understanding: patient states understanding of the procedure being performed  Site marked: the operative site was marked  Patient identity confirmed: verbally with patient    Location:     Foot:  R big toe  Pre-procedure details:     Skin preparation:  Alcohol and Betadine  Anesthesia (see MAR for exact dosages):     Anesthesia method:  Local infiltration    Local anesthetic:  Lidocaine 1% w/o epi  Nail Removal:     Nail removed:  Partial    Nail side:  Medial    Nail bed sutured: no    Ingrown nail:     Wedge excision of skin: no      Nail matrix removed or ablated:  None  Post-procedure details:     Dressing:  4x4 sterile gauze, antibiotic ointment and gauze roll    Patient tolerance of procedure:  " "Tolerated well, no immediate complications     SUBJECTIVE    Chief Complaint:  Ingrown toenail     Patient ID: Joe Diaz is a pleasant 44 year old male who presents today for an ingrown toenail on his right great toe. He states he has had it for quite some time. He has tried to cut it out himself multiple times. Just recently he noticed that it was bleeding, red and some puss coming out.          The following portions of the patient's history were reviewed and updated as appropriate: allergies, current medications, past family history, past medical history, past social history, past surgical history and problem list.    Review of Systems   Constitutional: Negative.    Respiratory: Negative.     Cardiovascular: Negative.    Gastrointestinal: Negative.    Genitourinary: Negative.          OBJECTIVE      Ht 6' 1\" (1.854 m)   Wt (!) 143 kg (315 lb)   BMI 41.56 kg/m²        Physical Exam  Constitutional:       Appearance: Normal appearance.   HENT:      Head: Normocephalic and atraumatic.   Eyes:      General:         Right eye: No discharge.         Left eye: No discharge.   Cardiovascular:      Rate and Rhythm: Normal rate and regular rhythm.      Pulses:           Dorsalis pedis pulses are 2+ on the right side and 2+ on the left side.        Posterior tibial pulses are 2+ on the right side and 2+ on the left side.   Pulmonary:      Effort: Pulmonary effort is normal.      Breath sounds: Normal breath sounds.   Feet:      Comments: Ingrown toenail noted to the right medial nail border with surrounding erythema and edema.  Skin:     General: Skin is warm.      Capillary Refill: Capillary refill takes less than 2 seconds.   Neurological:      Mental Status: He is alert and oriented to person, place, and time.      Sensory: Sensation is intact. No sensory deficit.   Psychiatric:         Mood and Affect: Mood normal.               "

## 2025-01-16 ENCOUNTER — PATIENT MESSAGE (OUTPATIENT)
Age: 45
End: 2025-01-16

## 2025-01-16 DIAGNOSIS — G47.10 HYPERSOMNOLENCE: Primary | ICD-10-CM

## 2025-01-16 RX ORDER — LISDEXAMFETAMINE DIMESYLATE 50 MG/1
50 CAPSULE ORAL EVERY MORNING
Qty: 90 CAPSULE | Refills: 0 | Status: SHIPPED | OUTPATIENT
Start: 2025-01-16

## 2025-01-27 ENCOUNTER — OFFICE VISIT (OUTPATIENT)
Dept: BEHAVIORAL/MENTAL HEALTH CLINIC | Facility: CLINIC | Age: 45
End: 2025-01-27
Payer: COMMERCIAL

## 2025-01-27 DIAGNOSIS — F90.0 ATTENTION DEFICIT HYPERACTIVITY DISORDER (ADHD), PREDOMINANTLY INATTENTIVE TYPE: Primary | ICD-10-CM

## 2025-01-27 PROCEDURE — 90791 PSYCH DIAGNOSTIC EVALUATION: CPT

## 2025-01-27 NOTE — BH TREATMENT PLAN
Outpatient Behavioral Health Psychotherapy Treatment Plan    Joe Perez  1980     Date of Initial Psychotherapy Assessment: 1/27/2025   Date of Current Treatment Plan: 01/27/25  Treatment Plan Target Date: 7/27/2025  Treatment Plan Expiration Date: 7/27/2025    Diagnosis:   1. Attention deficit hyperactivity disorder (ADHD), predominantly inattentive type            Area(s) of Need: ADHD    Long Term Goal 1 (in the client's own words): I want to better manage my ADHD symptoms    Stage of Change: Action    Target Date for completion: 7/27/2025     Anticipated therapeutic modalities: CBT     People identified to complete this goal: client and counselor      Objective 1: (identify the means of measuring success in meeting the objective): ct will identify symptoms that cause the most issues at home and work      Objective 2: (identify the means of measuring success in meeting the objective): ct will work on practicing mindfulness         I am currently under the care of a [facility] psychiatric provider: yes    My [facility] psychiatric provider is: Inocencio Cobian    I am currently taking psychiatric medications: Yes, as prescribed    I feel that I will be ready for discharge from mental health care when I reach the following (measurable goal/objective): When I am better at managing my ADHD    For children and adults who have a legal guardian:   Has there been any change to custody orders and/or guardianship status? No. If yes, attach updated documentation.    I have created my Crisis Plan and have been offered a copy of this plan    Behavioral Health Treatment Plan  Luke: Diagnosis and Treatment Plan explained to Joe Perez acknowledges an understanding of their diagnosis. Joe Perez agrees to this treatment plan.    I have been offered a copy of this Treatment Plan. yes

## 2025-01-27 NOTE — PSYCH
Behavioral Health Psychotherapy Assessment    Date of Initial Psychotherapy Assessment: 01/27/25  Referral Source: Dr. Cobian  Has a release of information been signed for the referral source? NA    Preferred Name: Joe Perez  Preferred Pronouns: He/him  YOB: 1980 Age: 44 y.o.  Sex assigned at birth: male   Gender Identity: male  Race:   Preferred Language: English    Emergency Contact:  Full Name: Bianca Perez  Relationship to Client: Wife  Contact information: [phone]    Primary Care Physician:  Enzo Bedolla DO  [address] [place] [id number] [phone]  Has a release of information been signed? No    Physical Health History:  Past surgical procedures: Carpal Tunnel surgery Tonsils removed  Do you have a history of any of the following: other Hypertension  Do you have any mobility issues? No    Relevant Family History:  Paternal Aunt depression  Paternal cousin depression suicide attempt    Presenting Problem (What brings you in?)  ADHD.  Ct is a 44 year ol male referred by Dr. Cobian for management of his ADHD symptoms.  Ct has been treated with medication for years and was still having some issues.  Ct was also diagnosed with OCD features when he was younger.  Ct takes Vyvanse and Lexapro.  Ct feels his OCD is well managed.  Ct is  with 2 young children ages 5 and 2.  Ct wife works full time.  Ct is an only child and has supportive parents.  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  Ct wife complains that ct leaves things out, leaves cabinet doors, open, forgets thins, does not finish projects.  Ct works 4-10 hour days and watches the kids on Mondays.  Ct wants to improve his management of his ADHD symptoms and be more efficient at home.  Ct sometimes gets overwhelmed at work but for the most part functions well there.  Ct and wife are going away this week and  the in laws will watch the children.  Ct realizes that he and wife are more like room mates and need time together.  Ct and I discussed that his ADHD symptoms maybe increasing due to expanded family responsibilities.      Mental Health Advance Directive:  Do you currently have a Mental Health Advance Directive?no    Diagnosis:   Diagnosis ICD-10-CM Associated Orders   1. Attention deficit hyperactivity disorder (ADHD), predominantly inattentive type  F90.0           Initial Assessment:     Current Mental Status:    Appearance: appropriate and casual      Behavior/Manner: cooperative      Affect/Mood:  Hopeful and euthymic    Speech:  Normal    Sleep:  Normal    Oriented to: oriented to self, oriented to place and oriented to time       Clinical Symptoms    Have you ever been assaultive to others or the environment: No      Have you ever been self-injurious: No      Counseling History:  Previous Counseling or Treatment  (Mental Health or Drug & Alcohol): Yes    Previous Counseling Details:  Counseling in the past Most recent 2013    Have you previously taken psychiatric medications: Yes    Previous Medications Attempted:  Adderall    Suicide Risk Assessment  Have you ever had a suicide attempt: No    Have you had incidents of suicidal ideation: No    Are you currently experiencing suicidal thoughts: No      Substance Abuse/Addiction Assessment:  Alcohol: No    Marijuana: No    Tobacco/Nicotine: No    Are you currently using any Medication Assisted Treatment for Substance Use: No      Disordered Eating History:  Do you have a history of disordered eating: No      Trauma and Abuse History:    Have you ever been abused: No      Legal History:    Have you ever been arrested  or had a DUI: No      Have you been incarcerated: No      Are you currently on parole/probation: No      Any current Children and Youth involvement: No      Any pending legal charges: No      Relationship History:    Current marital status:        Natural Supports:  Mother, father and friends    Relationship History:   8 years     Employment History    Are you currently employed: Yes      Employer/ Job title:      Sources of income/financial support:  Work and family members     History:      Status: no history of  duty  Educational History:     Have you ever been diagnosed with a learning disability: Yes      Learning disability:  ADHD and Dyslexia    Highest level of education:  Technical school certification    Have you ever had an IEP or 504-plan: Yes      Do you need assistance with reading or writing: No      Recommended Treatment:     Psychotherapy:  Individual sessions    Frequency:  2 times    Session frequency:  Monthly      Visit start and stop times:    01/27/25

## 2025-02-07 DIAGNOSIS — F41.9 ANXIETY AND DEPRESSION: ICD-10-CM

## 2025-02-07 DIAGNOSIS — F32.A ANXIETY AND DEPRESSION: ICD-10-CM

## 2025-02-07 RX ORDER — ESCITALOPRAM OXALATE 10 MG/1
10 TABLET ORAL DAILY
Qty: 90 TABLET | Refills: 1 | Status: SHIPPED | OUTPATIENT
Start: 2025-02-07

## 2025-02-11 ENCOUNTER — TELEPHONE (OUTPATIENT)
Age: 45
End: 2025-02-11

## 2025-02-11 NOTE — TELEPHONE ENCOUNTER
[place] with [facility] calling asking that we fax most recent sleep study to her at [phone]. States she received one but the fax did not come through clear.

## 2025-02-20 ENCOUNTER — PATIENT MESSAGE (OUTPATIENT)
Dept: FAMILY MEDICINE CLINIC | Facility: CLINIC | Age: 45
End: 2025-02-20

## 2025-02-20 ENCOUNTER — NURSE TRIAGE (OUTPATIENT)
Age: 45
End: 2025-02-20

## 2025-02-20 NOTE — TELEPHONE ENCOUNTER
"Reason for Disposition  • MILD pain that comes and goes (cramps) > 72 hours  (Exception: This same abdominal pain is a chronic symptom recurrent or ongoing AND present > 4 weeks.)  • MILD constipation    Answer Assessment - Initial Assessment Questions  1. LOCATION: \"Where does it hurt?\"       Upper left abdomen   2. RADIATION: \"Does the pain shoot anywhere else?\" (e.g., chest, back)      No   3. ONSET: \"When did the pain begin?\" (e.g., minutes, hours or days ago)       2-3 days ago     5. PATTERN \"Does the pain come and go, or is it constant?\"      Intermittent   6. SEVERITY: \"How bad is the pain?\"  (e.g., Scale 1-10; mild, moderate, or severe)      1-2 pressure   7. RECURRENT SYMPTOM: \"Have you ever had this type of stomach pain before?\" If Yes, ask: \"When was the last time?\" and \"What happened that time?\"       Recurrent   8. AGGRAVATING FACTORS: \"Does anything seem to cause this pain?\" (e.g., foods, stress, alcohol)      Unknown   9. CARDIAC SYMPTOMS: \"Do you have any of the following symptoms: chest pain, difficulty breathing, sweating, nausea?\"      No   10. OTHER SYMPTOMS: \"Do you have any other symptoms?\" (e.g., back pain, diarrhea, fever, urination pain, vomiting)        Constipation    Answer Assessment - Initial Assessment Questions  1. STOOL PATTERN OR FREQUENCY: \"How often do you have a bowel movement (BM)?\"  (Normal range: 3 times a day to every 3 days)  \"When was your last BM?\"        Usually daily.   3. ONSET: \"When did the constipation begin?\"      3 days ago   7. BLOOD ON STOOLS: \"Has there been any blood on the toilet tissue or on the surface of the BM?\" If Yes, ask: \"When was the last time?\"      No   8. CHRONIC CONSTIPATION: \"Is this a new problem for you?\"  If No, ask: \"How long have you had this problem?\" (days, weeks, months)       No   8. CHANGES IN DIET OR HYDRATION: \"Have there been any recent changes in your diet?\" \"How much fluids are you drinking on a daily basis?\"  \"How much have you " "had to drink today?\"      Yes   9. MEDICINES: \"Have you been taking any new medicines?\" \"Are you taking any narcotic pain medicines?\" (e.g., Dilaudid, morphine, Percocet, Vicodin)       No   10. LAXATIVES: \"Have you been using any stool softeners, laxatives, or enemas?\"  If Yes, ask \"What, how often, and when was the last time?\"          Metamucil   11. ACTIVITY:  \"How much walking do you do every day?\"  \"Has your activity level decreased in the past week?\"         No   12. CAUSE: \"What do you think is causing the constipation?\"         Decreased fiber in a diet     14. OTHER SYMPTOMS: \"Do you have any other symptoms?\" (e.g., abdomen pain, bloating, fever, vomiting)        Bloating, gas pressure    Protocols used: Abdominal Pain - Upper-Adult-[place], Constipation-Adult-AH    "

## 2025-02-20 NOTE — TELEPHONE ENCOUNTER
Patient stated that he has been having intermittent discomfort and pressure like gas pain in upper left quadrant in abdomen on and off along with constipations for 3 days. Patient took Metamucil, had BM twice, feels like bowels are not emptied. Patient was advised to take Miralax, Dulcolax if no relief after Miralax. To call back if abdominal pain constant continues for 2 hours or if no normal BM after taking Laxatives. Patient agreeable.

## 2025-02-25 ENCOUNTER — NURSE TRIAGE (OUTPATIENT)
Age: 45
End: 2025-02-25

## 2025-02-25 NOTE — TELEPHONE ENCOUNTER
"Reason for Disposition  • Abdominal pain is a chronic symptom (recurrent or ongoing AND lasting > 4 weeks)    Answer Assessment - Initial Assessment Questions  1. LOCATION: \"Where does it hurt?\"           RIB CAGE LEFT SIDE     PRESSURE      2. RADIATION: \"Does the pain shoot anywhere else?\" (e.g., chest, back)       Denies         3. ONSET: \"When did the pain begin?\" (e.g., minutes, hours or days ago)        A week ago       4. SUDDEN: \"Gradual or sudden onset?\"        Gradual        5. PATTERN \"Does the pain come and go, or is it constant?\"        Intermittent         6. SEVERITY: \"How bad is the pain?\"  (e.g., Scale 1-10; mild, moderate, or severe)        Month ago discomfort       7. RECURRENT SYMPTOM: \"Have you ever had this type of stomach pain before?\" If Yes, ask: \"When was the last time?\" and \"What happened that time?\"         Unsure     8. AGGRAVATING FACTORS: \"Does anything seem to cause this pain?\" (e.g., foods, stress, alcohol)        Unsure         9. CARDIAC SYMPTOMS: \"Do you have any of the following symptoms: chest pain, difficulty breathing, sweating, nausea?\"        Denies         10. OTHER SYMPTOMS: \"Do you have any other symptoms?\" (e.g., back pain, diarrhea, fever, urination pain, vomiting)          Constipation problems    Protocols used: Abdominal Pain - Upper-Adult-[place]    "

## 2025-02-25 NOTE — TELEPHONE ENCOUNTER
Patient calls stating he's not having pain just some discomfort in upper left abdominal area. Rates the discomfort a 1 and states it is intermittent.  Denies fevers, N/V, chills, abnormal bowel movements, blood in the stool , difficulty eating or drinking, weakness.    No other symptoms or problems at this time.   Started feeling this discomfort over a month ago.   The discomfort is not worsening or changing.     Scheduled for Monday 3 3 2025 . I offered multiple other appts for 26 27th but patient declined stating he has to work.  S/S when to seek care sooner or ER/UC evaluation  reviewed with the patient. Verbalized understanding.

## 2025-03-12 ENCOUNTER — OFFICE VISIT (OUTPATIENT)
Dept: FAMILY MEDICINE CLINIC | Facility: CLINIC | Age: 45
End: 2025-03-12
Payer: COMMERCIAL

## 2025-03-12 VITALS
SYSTOLIC BLOOD PRESSURE: 140 MMHG | RESPIRATION RATE: 16 BRPM | WEIGHT: 218 LBS | BODY MASS INDEX: 28.89 KG/M2 | HEART RATE: 85 BPM | OXYGEN SATURATION: 98 % | DIASTOLIC BLOOD PRESSURE: 70 MMHG | HEIGHT: 73 IN | TEMPERATURE: 96.9 F

## 2025-03-12 DIAGNOSIS — F42.9 OBSESSIVE-COMPULSIVE DISORDER, UNSPECIFIED TYPE: ICD-10-CM

## 2025-03-12 DIAGNOSIS — R10.12 LUQ PAIN: Primary | ICD-10-CM

## 2025-03-12 DIAGNOSIS — I10 PRIMARY HYPERTENSION: ICD-10-CM

## 2025-03-12 DIAGNOSIS — F90.0 ATTENTION DEFICIT HYPERACTIVITY DISORDER (ADHD), PREDOMINANTLY INATTENTIVE TYPE: ICD-10-CM

## 2025-03-12 PROCEDURE — 99214 OFFICE O/P EST MOD 30 MIN: CPT | Performed by: FAMILY MEDICINE

## 2025-03-12 NOTE — PROGRESS NOTES
Name: Joe Perez      : 1980      MRN: [id number]  Encounter Provider: Enzo Bedolla DO  Encounter Date: 3/12/2025   Encounter department: [facility]    Assessment & Plan  LUQ pain  -Ongoing left upper quadrant abdominal pain for over a month.  Does admit to incoming cruise about a month ago.  Upon return had some change in stools.  Admits more days of constipation, fluctuates to loose stools.  Denies blood in his stool.  Denies any change in color of his stool.  -Has been taking fiber regularly.  Does note this has helped and is moving his bowels more regularly but continues with left upper quadrant discomfort which is reproducible with some palpation and stretching.  -Recommend evaluation with CT abdomen pelvis with ongoing symptoms.  Orders:    Comprehensive metabolic panel; Future    CBC and differential; Future    Lipase; Future    CT abdomen pelvis w contrast    Attention deficit hyperactivity disorder (ADHD), predominantly inattentive type  Well-controlled.  Continue current management with Vyvanse 50 mg every morning.       Obsessive-compulsive disorder, unspecified type  - Slight increase in symptoms. Increase Lexapro to 20 mg po daily.          Primary hypertension  -Does check blood pressures at home on occasion, notes that goal under 130/80.  -Continue current management with losartan 100 mg p.o. daily.  -Continue working on diet and lifestyle interventions  -Follow-up in 6 months              History of Present Illness     Joe is a 44-year-old male who presents today for follow-up and to discuss ongoing concern for left upper quadrant abdominal discomfort.  Notes he has been dealing with this for over 4 weeks since returning from cruise.  He believes it started with some changes in his bowel movements.  He did have some loose stools which transitioned into slowed stools and constipation.  He did make some changes in his diet and add probiotic and fiber which have  had some mild improvement.  He continues to be more on the constipated side with stools every couple days.  He notes after bowels he does get some relief of this discomfort however has been going more regularly and feels pain has not changed a whole lot.  He can reproduce the pain.  He can feel the pain when he stretches in certain positions.  Denies any fevers, chills, nausea, vomiting.  Denies any unwanted weight loss or night sweats.  He is otherwise taking his other medications as prescribed.  He notes some increase in some of his obsessive tendencies.  He has been taking his Lexapro however feels may benefit from increasing dosage.  Denies any SI or HI.  He does check his blood pressure medication at home and notes they are well-controlled.  No symptoms of high or low blood pressure.  No other concerns.      Review of Systems   Constitutional:  Negative for chills, fatigue, fever and unexpected weight change.   HENT:  Negative for congestion, ear pain, postnasal drip, rhinorrhea, sinus pressure, sinus pain and sore throat.    Eyes:  Negative for pain and visual disturbance.   Respiratory:  Negative for cough and shortness of breath.    Cardiovascular:  Negative for chest pain and palpitations.   Gastrointestinal:  Positive for abdominal pain, constipation and diarrhea. Negative for abdominal distention, blood in stool, nausea, rectal pain and vomiting.   Genitourinary:  Negative for dysuria and hematuria.   Musculoskeletal:  Negative for arthralgias and back pain.   Skin:  Negative for color change and rash.   Neurological:  Negative for seizures and syncope.   Psychiatric/Behavioral:  Positive for decreased concentration. Negative for behavioral problems, confusion, sleep disturbance and suicidal ideas. The patient is not nervous/anxious.    All other systems reviewed and are negative.    Past Medical History:   Diagnosis Date    ADHD (attention deficit hyperactivity disorder)     CPAP (continuous positive  airway pressure) dependence     Hypersomnolence     Hypertension     Hypoxia     Infrapatellar bursitis of right knee     Mononucleosis     Mycobacterial infectious disease     JAMIE (obstructive sleep apnea)     Pneumonia     Right upper lobe pneumonia 10/7/2020    Varicella      Past Surgical History:   Procedure Laterality Date    BRONCHOSCOPY      CARPAL TUNNEL RELEASE Bilateral     CIRCUMCISION      EPIDURAL BLOCK INJECTION Left 11/30/2022    Procedure: L4-L5, L5-S1  TRANSFORAMINAL epidural steroid injection ([id number]);  Surgeon: Juan Greer DO;  Location: [facility] MAIN OR;  Service: Pain Management     LUMBAR EPIDURAL INJECTION N/A 2/24/2023    Procedure: L3 L4  LUMBAR epdirual steroid injection ([id number]);  Surgeon: Juan Greer DO;  Location: [facility] MAIN OR;  Service: Pain Management     TONSILLECTOMY      WISDOM TOOTH EXTRACTION      X1      Family History   Problem Relation Age of Onset    Hypertension Mother     Diabetes Mother         pre-diabetic     Heart attack Father     Diabetes Father     Hypertension Father     Obesity Father         hx LRYGB    Heart disease Father         hx MI age 40    Arthritis Family     Cancer Family     Cancer Paternal Grandmother         hx bladder cancer    Heart attack Paternal Grandfather     No Known Problems Son     Stroke Neg Hx     Thyroid disease Neg Hx      Social History     Tobacco Use    Smoking status: Never    Smokeless tobacco: Never   Vaping Use    Vaping status: Never Used   Substance and Sexual Activity    Alcohol use: Yes     Comment: rare    Drug use: No    Sexual activity: Yes     Partners: Female     Current Outpatient Medications on File Prior to Visit   Medication Sig    escitalopram (LEXAPRO) 10 mg tablet Take 1 tablet (10 mg total) by mouth daily    fexofenadine (ALLEGRA) 180 MG tablet Take 180 mg by mouth as needed      lisdexamfetamine (Vyvanse) 50 MG capsule Take 1 capsule (50 mg total) by mouth every morning Max Daily Amount: 50 mg  "   losartan (COZAAR) 100 MG tablet Take 1 tablet (100 mg total) by mouth daily    VITAMIN D PO Take by mouth daily    Multiple Vitamin (MULTIVITAMIN ADULT PO) Take by mouth     Allergies   Allergen Reactions    Olmesartan-Amlodipine-Hctz Edema    Terazosin Edema     Immunization History   Administered Date(s) Administered    COVID-19 PFIZER VACCINE 0.3 ML IM 03/28/2021, 04/18/2021, 12/10/2021    Influenza Injectable, MDCK, Preservative Free, Quadrivalent, 0.5 mL 09/26/2019    Influenza Recombinant Preservative Free Im 09/30/2024    Influenza, recombinant, quadrivalent,injectable, preservative free 09/25/2020, 10/15/2021, 09/19/2022    Influenza, seasonal, injectable 10/01/2017    Pneumococcal Conjugate 13-Valent 09/10/2021    Pneumococcal Conjugate Vaccine 20-valent (Pcv20), Polysace 09/19/2022    Tdap 05/06/2017     Objective   /70 (BP Location: Left arm, Patient Position: Sitting, Cuff Size: Large)   Pulse 85   Temp (!) 96.9 °F (36.1 °C) (Tympanic)   Resp 16   Ht 6' 1\" (1.854 m)   Wt 98.9 kg (218 lb)   SpO2 98%   BMI 28.76 kg/m²     Physical Exam  Vitals and nursing note reviewed.   Constitutional:       General: He is not in acute distress.     Appearance: Normal appearance.   HENT:      Head: Normocephalic and atraumatic.      Right Ear: Tympanic membrane and external ear normal.      Left Ear: Tympanic membrane and external ear normal.      Nose: Nose normal.      Mouth/Throat:      Mouth: Mucous membranes are moist.   Eyes:      Extraocular Movements: Extraocular movements intact.      Conjunctiva/sclera: Conjunctivae normal.      Pupils: Pupils are equal, round, and reactive to light.   Cardiovascular:      Rate and Rhythm: Normal rate and regular rhythm.      Pulses: Normal pulses.      Heart sounds: No murmur heard.  Pulmonary:      Effort: Pulmonary effort is normal.      Breath sounds: Normal breath sounds. No wheezing, rhonchi or rales.   Abdominal:      General: Bowel sounds are normal. " There is no distension.      Palpations: Abdomen is soft. There is no hepatomegaly, splenomegaly or mass.      Tenderness: There is abdominal tenderness (Mild). There is no right CVA tenderness, left CVA tenderness, guarding or rebound. Negative signs include Castellon's sign, Rovsing's sign, McBurney's sign and psoas sign.   Musculoskeletal:         General: Normal range of motion.      Cervical back: Normal range of motion.      Right lower leg: No edema.      Left lower leg: No edema.   Lymphadenopathy:      Cervical: No cervical adenopathy.   Skin:     General: Skin is warm.      Capillary Refill: Capillary refill takes less than 2 seconds.   Neurological:      General: No focal deficit present.      Mental Status: He is alert and oriented to person, place, and time.   Psychiatric:         Mood and Affect: Mood normal.         Behavior: Behavior normal.

## 2025-03-13 PROBLEM — R10.12 LUQ PAIN: Status: ACTIVE | Noted: 2025-03-13

## 2025-03-13 NOTE — ASSESSMENT & PLAN NOTE
-Ongoing left upper quadrant abdominal pain for over a month.  Does admit to incoming cruise about a month ago.  Upon return had some change in stools.  Admits more days of constipation, fluctuates to loose stools.  Denies blood in his stool.  Denies any change in color of his stool.  -Has been taking fiber regularly.  Does note this has helped and is moving his bowels more regularly but continues with left upper quadrant discomfort which is reproducible with some palpation and stretching.  -Recommend evaluation with CT abdomen pelvis with ongoing symptoms.  Orders:    Comprehensive metabolic panel; Future    CBC and differential; Future    Lipase; Future    CT abdomen pelvis w contrast

## 2025-03-13 NOTE — ASSESSMENT & PLAN NOTE
-Does check blood pressures at home on occasion, notes that goal under 130/80.  -Continue current management with losartan 100 mg p.o. daily.  -Continue working on diet and lifestyle interventions  -Follow-up in 6 months

## 2025-03-15 ENCOUNTER — APPOINTMENT (OUTPATIENT)
Dept: LAB | Facility: HOSPITAL | Age: 45
End: 2025-03-15
Payer: COMMERCIAL

## 2025-03-15 DIAGNOSIS — R73.01 IMPAIRED FASTING GLUCOSE: ICD-10-CM

## 2025-03-15 DIAGNOSIS — R10.12 LUQ PAIN: ICD-10-CM

## 2025-03-15 DIAGNOSIS — E78.2 MIXED HYPERLIPIDEMIA: ICD-10-CM

## 2025-03-15 LAB
ALBUMIN SERPL BCG-MCNC: 4.3 G/DL (ref 3.5–5)
ALP SERPL-CCNC: 79 U/L (ref 34–104)
ALT SERPL W P-5'-P-CCNC: 49 U/L (ref 7–52)
ANION GAP SERPL CALCULATED.3IONS-SCNC: 12 MMOL/L (ref 4–13)
AST SERPL W P-5'-P-CCNC: 36 U/L (ref 13–39)
BASOPHILS # BLD AUTO: 0.05 THOUSANDS/ÂΜL (ref 0–0.1)
BASOPHILS NFR BLD AUTO: 1 % (ref 0–1)
BILIRUB SERPL-MCNC: 0.35 MG/DL (ref 0.2–1)
BUN SERPL-MCNC: 11 MG/DL (ref 5–25)
CALCIUM SERPL-MCNC: 8.9 MG/DL (ref 8.4–10.2)
CHLORIDE SERPL-SCNC: 102 MMOL/L (ref 96–108)
CO2 SERPL-SCNC: 24 MMOL/L (ref 21–32)
CREAT SERPL-MCNC: 0.79 MG/DL (ref 0.6–1.3)
EOSINOPHIL # BLD AUTO: 0.26 THOUSAND/ÂΜL (ref 0–0.61)
EOSINOPHIL NFR BLD AUTO: 4 % (ref 0–6)
ERYTHROCYTE [DISTWIDTH] IN BLOOD BY AUTOMATED COUNT: 13 % (ref 11.6–15.1)
GFR SERPL CREATININE-BSD FRML MDRD: 110 ML/MIN/1.73SQ M
GLUCOSE SERPL-MCNC: 107 MG/DL (ref 65–140)
HCT VFR BLD AUTO: 43 % (ref 36.5–49.3)
HGB BLD-MCNC: 14.7 G/DL (ref 12–17)
IMM GRANULOCYTES # BLD AUTO: 0.02 THOUSAND/UL (ref 0–0.2)
IMM GRANULOCYTES NFR BLD AUTO: 0 % (ref 0–2)
LIPASE SERPL-CCNC: 36 U/L (ref 11–82)
LYMPHOCYTES # BLD AUTO: 2.57 THOUSANDS/ÂΜL (ref 0.6–4.47)
LYMPHOCYTES NFR BLD AUTO: 39 % (ref 14–44)
MCH RBC QN AUTO: 28.3 PG (ref 26.8–34.3)
MCHC RBC AUTO-ENTMCNC: 34.2 G/DL (ref 31.4–37.4)
MCV RBC AUTO: 83 FL (ref 82–98)
MONOCYTES # BLD AUTO: 0.74 THOUSAND/ÂΜL (ref 0.17–1.22)
MONOCYTES NFR BLD AUTO: 11 % (ref 4–12)
NEUTROPHILS # BLD AUTO: 2.91 THOUSANDS/ÂΜL (ref 1.85–7.62)
NEUTS SEG NFR BLD AUTO: 45 % (ref 43–75)
NRBC BLD AUTO-RTO: 0 /100 WBCS
PLATELET # BLD AUTO: 238 THOUSANDS/UL (ref 149–390)
PMV BLD AUTO: 9.2 FL (ref 8.9–12.7)
POTASSIUM SERPL-SCNC: 4 MMOL/L (ref 3.5–5.3)
PROT SERPL-MCNC: 7.2 G/DL (ref 6.4–8.4)
RBC # BLD AUTO: 5.2 MILLION/UL (ref 3.88–5.62)
SODIUM SERPL-SCNC: 138 MMOL/L (ref 135–147)
WBC # BLD AUTO: 6.55 THOUSAND/UL (ref 4.31–10.16)

## 2025-03-15 PROCEDURE — 85025 COMPLETE CBC W/AUTO DIFF WBC: CPT

## 2025-03-15 PROCEDURE — 36415 COLL VENOUS BLD VENIPUNCTURE: CPT

## 2025-03-15 PROCEDURE — 80053 COMPREHEN METABOLIC PANEL: CPT

## 2025-03-15 PROCEDURE — 83690 ASSAY OF LIPASE: CPT

## 2025-03-17 ENCOUNTER — SOCIAL WORK (OUTPATIENT)
Dept: BEHAVIORAL/MENTAL HEALTH CLINIC | Facility: CLINIC | Age: 45
End: 2025-03-17
Payer: COMMERCIAL

## 2025-03-17 DIAGNOSIS — F90.0 ATTENTION DEFICIT HYPERACTIVITY DISORDER (ADHD), PREDOMINANTLY INATTENTIVE TYPE: Primary | ICD-10-CM

## 2025-03-17 PROCEDURE — 90834 PSYTX W PT 45 MINUTES: CPT

## 2025-03-17 NOTE — PSYCH
"Behavioral Health Psychotherapy Progress Note    Psychotherapy Provided: Individual Psychotherapy     1. Attention deficit hyperactivity disorder (ADHD), predominantly inattentive type            Goals addressed in session: Goal 1     DATA: ct shared that he and wife have struggles due to his ADHD.  Ct described wife having less patience for him and being critical at times of him.  Ct makes an effort to get things done and not be so spread out.  Ct and wife have a 5 and 3 year old so that makes things challenging in there house as both ct and wife work full time.  Ct wants to work on organization in his workshop at home but may not have the space at this time because he is working on a car and all the part for that car are taking up space he could use for organization.  We focused on recognition of being off task and asking yourself what do I need to get back on task.  During this session, this clinician used the following therapeutic modalities: Cognitive Behavioral Therapy    Substance Abuse was not addressed during this session. If the client is diagnosed with a co-occurring substance use disorder, please indicate any changes in the frequency or amount of use: na. Stage of change for addressing substance use diagnoses: No substance use/Not applicable    ASSESSMENT:  Joe Perez presents with a Anxious mood.     his affect is Normal range and intensity, which is congruent, with his mood and the content of the session. The client has made progress on their goals.    No SI/HI Joe Perez presents with a none risk of suicide, none risk of self-harm, and none risk of harm to others.    For any risk assessment that surpasses a \"low\" rating, a safety plan must be developed.    A safety plan was indicated: no  If yes, describe in detail na    PLAN: Between sessions, Joe Perez will manage behavior. At the next session, the therapist will use Cognitive Behavioral Therapy to address ADHD.    Behavioral Health " Treatment Plan and Discharge Planning: Joe Perez is aware of and agrees to continue to work on their treatment plan. They have identified and are working toward their discharge goals. yes    Depression Follow-up Plan Completed: Not applicable    Visit start and stop times:    03/17/25  Start Time: 0758  Stop Time: 0846  Total Visit Time: 48 minutes

## 2025-03-19 ENCOUNTER — HOSPITAL ENCOUNTER (OUTPATIENT)
Dept: CT IMAGING | Facility: HOSPITAL | Age: 45
Discharge: HOME | End: 2025-03-19
Attending: FAMILY MEDICINE
Payer: COMMERCIAL

## 2025-03-19 PROCEDURE — 74177 CT ABD & PELVIS W/CONTRAST: CPT

## 2025-03-19 RX ADMIN — IOHEXOL 75 ML: 350 INJECTION, SOLUTION INTRAVENOUS at 18:01

## 2025-03-26 ENCOUNTER — RESULTS FOLLOW-UP (OUTPATIENT)
Dept: FAMILY MEDICINE CLINIC | Facility: CLINIC | Age: 45
End: 2025-03-26

## 2025-03-26 NOTE — RESULT ENCOUNTER NOTE
Juan Alberto Diaz,    CT scan looks good.  No concerning results.  You have a few simple cysts on the kidneys(these are benign fluid-filled sacs) likely not contributing to your symptoms.  Please continue with dietary changes as we had reviewed.  Please continue to monitor and keep me posted if no improvement in your symptoms.  Let me know if you have any questions.    -Dr. PERSUAD

## 2025-04-09 DIAGNOSIS — G47.10 HYPERSOMNOLENCE: ICD-10-CM

## 2025-04-09 DIAGNOSIS — I10 ESSENTIAL HYPERTENSION: ICD-10-CM

## 2025-04-09 RX ORDER — LISDEXAMFETAMINE DIMESYLATE 50 MG/1
50 CAPSULE ORAL EVERY MORNING
Qty: 90 CAPSULE | Refills: 0 | Status: SHIPPED | OUTPATIENT
Start: 2025-04-09

## 2025-04-09 NOTE — TELEPHONE ENCOUNTER
1 [id number] 01/17/2025 01/16/2025 Lisdexamfetamine Dimesylate (Capsule) 90.0 90 50 MG NA [facility] Commercial Insurance 0 / 0 [place]   1 [id number] 12/12/2024 12/12/2024 HYDROcodone BITARTRATE 5 MG ORAL TABLET/ACETAMINOPHEN 325 MG (Tablet) 6.0 1 325 MG-5 MG 30.0 [facility] Commercial Insurance 0 / 0 [place]   1 [id number] 12/06/2024 12/02/2024 Lisdexamfetamine Dimesylate (Capsule) 90.0 90 40 MG NA [facility] Commercial Insurance 0 / 0 [place]   1 [id number] 09/09/2024 09/09/2024 Lisdexamfetamine Dimesylate (Capsule) 90.0 90 40 MG NA [facility] Commercial Insurance 0 / 0 [place]   1 [id number] 06/13/2024 06/13/2024 Lisdexamfetamine Dimesylate (Capsule) 90.0 90 40 MG [facility] 0 / 0 [place]

## 2025-04-10 DIAGNOSIS — F41.9 ANXIETY AND DEPRESSION: ICD-10-CM

## 2025-04-10 DIAGNOSIS — F32.A ANXIETY AND DEPRESSION: ICD-10-CM

## 2025-04-10 RX ORDER — ESCITALOPRAM OXALATE 10 MG/1
10 TABLET ORAL DAILY
Qty: 90 TABLET | Refills: 1 | Status: SHIPPED | OUTPATIENT
Start: 2025-04-10

## 2025-04-10 RX ORDER — LOSARTAN POTASSIUM 100 MG/1
100 TABLET ORAL DAILY
Qty: 90 TABLET | Refills: 1 | Status: SHIPPED | OUTPATIENT
Start: 2025-04-10

## 2025-06-02 ENCOUNTER — OFFICE VISIT (OUTPATIENT)
Dept: CARDIOLOGY CLINIC | Facility: CLINIC | Age: 45
End: 2025-06-02
Payer: COMMERCIAL

## 2025-06-02 VITALS
BODY MASS INDEX: 41.75 KG/M2 | OXYGEN SATURATION: 98 % | SYSTOLIC BLOOD PRESSURE: 124 MMHG | DIASTOLIC BLOOD PRESSURE: 80 MMHG | HEIGHT: 73 IN | HEART RATE: 78 BPM | WEIGHT: 315 LBS

## 2025-06-02 DIAGNOSIS — G47.33 OSA (OBSTRUCTIVE SLEEP APNEA): ICD-10-CM

## 2025-06-02 DIAGNOSIS — I34.0 NONRHEUMATIC MITRAL (VALVE) INSUFFICIENCY: ICD-10-CM

## 2025-06-02 DIAGNOSIS — I10 PRIMARY HYPERTENSION: Primary | ICD-10-CM

## 2025-06-02 DIAGNOSIS — E66.813 CLASS 3 SEVERE OBESITY DUE TO EXCESS CALORIES WITH SERIOUS COMORBIDITY AND BODY MASS INDEX (BMI) OF 40.0 TO 44.9 IN ADULT: ICD-10-CM

## 2025-06-02 PROCEDURE — 99203 OFFICE O/P NEW LOW 30 MIN: CPT | Performed by: INTERNAL MEDICINE

## 2025-06-02 PROCEDURE — 93000 ELECTROCARDIOGRAM COMPLETE: CPT | Performed by: INTERNAL MEDICINE

## 2025-06-02 NOTE — PROGRESS NOTES
[facility]  [address] [place] [address] [address]   [place] [address]    Cardiology Consultation    Joe Perez  [id number]  1980      Consult for: hypertension  Appreciate consult by: Enzo Bedolla DO      Discussion/Summary:     Assessment & Plan  Primary hypertension  Blood pressure controlled with losartan.  Discussed diet and weight loss.  He has multiple risk factors for CAD including family history, hypertension and obesity.  Discussed risk factor reduction.  Will order coronary artery calcium score for further evaluation/restratification.  Nonrheumatic mitral (valve) insufficiency  Minimal regurgitation on previous echocardiogram  Repeat echocardiogram in the future.  JAMIE (obstructive sleep apnea)  Importance of CPAP use reviewed.  Class 3 severe obesity due to excess calories with serious comorbidity and body mass index (BMI) of 40.0 to 44.9 in adult  Discussed diet and weight loss.  He was on Mounjaro in the past but stopped due to severe side effects.        HPI:     Joe Perez is a 44 y.o. here for evaluation of hypertension.  The patient was previously seen by Dr. Oconnor in September 2020 for evaluation of hypertension and fatigue.  He was on losartan 100 mg daily which she continues to take.  Echocardiogram was done which showed normal function with no LVH.  He was diagnosed with obstructive sleep apnea previously and was using CPAP.  He has been feeling well without any symptoms of chest pain, shortness of breath, lower extremity edema, orthopnea or paroxysmal nocturnal dyspnea.  He denies any syncope or near syncope.  He has changed his diet and is now working on bringing meals from home and reducing portion size.  He is very active at work and with his children.      Past Medical History[1]  Social History[2]   Family History[3]  Past Surgical History[4]  Current Medications[5]  Allergies   Allergen Reactions    Olmesartan-Amlodipine-Hctz Edema     "Terazosin Edema       Review of Systems:   Review of Systems   Respiratory:  Negative for shortness of breath.    Cardiovascular:  Negative for chest pain, palpitations and leg swelling.   All other systems reviewed and are negative.      Physical Examination:     Vitals:    06/02/25 1341   BP: 124/80   BP Location: Left arm   Patient Position: Sitting   Cuff Size: Large   Pulse: 78   SpO2: 98%   Weight: (!) 144 kg (318 lb)   Height: 6' 1\" (1.854 m)       Physical Exam   Constitutional: He appears healthy. No distress.   Eyes: Pupils are equal, round, and reactive to light. Conjunctivae are normal.   Neck: No JVD present.   Cardiovascular: Normal rate, regular rhythm and normal heart sounds. Exam reveals no gallop and no friction rub.   No murmur heard.  Pulmonary/Chest: Effort normal and breath sounds normal. He has no wheezes. He has no rales.   Musculoskeletal:         General: No tenderness, deformity or edema.      Cervical back: Normal range of motion and neck supple.   Neurological: He is alert and oriented to person, place, and time.   Skin: Skin is warm and dry.        Labs:     Lab Results   Component Value Date    WBC 6.55 03/15/2025    HGB 14.7 03/15/2025    HCT 43.0 03/15/2025    MCV 83 03/15/2025    RDW 13.0 03/15/2025     03/15/2025     BMP:  Lab Results   Component Value Date    SODIUM 138 03/15/2025    K 4.0 03/15/2025     03/15/2025    CO2 24 03/15/2025    BUN 11 03/15/2025    CREATININE 0.79 03/15/2025    GLUC 107 03/15/2025    GLUF 95 09/30/2024    CALCIUM 8.9 03/15/2025    CORRECTEDCA 9.7 10/09/2020    EGFR 110 03/15/2025    MG 1.8 11/07/2020     LFT:  Lab Results   Component Value Date    AST 36 03/15/2025    ALT 49 03/15/2025    ALKPHOS 79 03/15/2025    TP 7.2 03/15/2025    ALB 4.3 03/15/2025      Lab Results   Component Value Date    UMX5VLZSGUTB 2.160 07/07/2020     Lab Results   Component Value Date    HGBA1C 5.7 (H) 06/03/2024     Lipid Profile:   Lab Results   Component Value " Date    TRIG 193 (H) 09/30/2024    HDL 29 (L) 09/30/2024       Imaging & Testing   I have personally reviewed pertinent reports.      Cardiac Testing   See above    EKG: Personally reviewed.    Normal ECG      Mike Britton DO, [facility]  [phone]  Please call with any questions.         [1]   Past Medical History:  Diagnosis Date    ADHD (attention deficit hyperactivity disorder)     CPAP (continuous positive airway pressure) dependence     Hypersomnolence     Hypertension     Hypoxia     Infrapatellar bursitis of right knee     Mononucleosis     Mycobacterial infectious disease     JAMIE (obstructive sleep apnea)     Pneumonia     Right upper lobe pneumonia 10/7/2020    Varicella    [2]   Social History  Tobacco Use    Smoking status: Never    Smokeless tobacco: Never   Vaping Use    Vaping status: Never Used   Substance Use Topics    Alcohol use: Yes     Comment: rare    Drug use: No   [3]   Family History  Problem Relation Name Age of Onset    Hypertension Mother Xena     Diabetes Mother Xena         pre-diabetic     Heart attack Father Matt     Diabetes Father Matt     Hypertension Father Matt     Obesity Father Matt         hx LRYGB    Heart disease Father Matt         hx MI age 40    Arthritis Family      Cancer Family      Cancer Paternal Grandmother Marielle         hx bladder cancer    Heart attack Paternal Grandfather      No Known Problems Son Mao     Stroke Neg Hx      Thyroid disease Neg Hx     [4]   Past Surgical History:  Procedure Laterality Date    BRONCHOSCOPY      CARPAL TUNNEL RELEASE Bilateral     CIRCUMCISION      EPIDURAL BLOCK INJECTION Left 11/30/2022    Procedure: L4-L5, L5-S1  TRANSFORAMINAL epidural steroid injection ([id number]);  Surgeon: Juan Greer DO;  Location: [facility] MAIN OR;  Service: Pain Management     LUMBAR EPIDURAL INJECTION N/A 2/24/2023    Procedure: L3 L4  LUMBAR epdirual steroid injection ([id number]);  Surgeon: Juan Greer DO;   Location: [facility] MAIN OR;  Service: Pain Management     TONSILLECTOMY      WISDOM TOOTH EXTRACTION      X1    [5]   Current Outpatient Medications:     escitalopram (LEXAPRO) 10 mg tablet, Take 1 tablet (10 mg total) by mouth daily, Disp: 90 tablet, Rfl: 1    fexofenadine (ALLEGRA) 180 MG tablet, Take 180 mg by mouth as needed, Disp: , Rfl:     lisdexamfetamine (Vyvanse) 50 MG capsule, Take 1 capsule (50 mg total) by mouth every morning Max Daily Amount: 50 mg, Disp: 90 capsule, Rfl: 0    losartan (COZAAR) 100 MG tablet, Take 1 tablet (100 mg total) by mouth daily, Disp: 90 tablet, Rfl: 1    Multiple Vitamin (MULTIVITAMIN ADULT PO), Take by mouth, Disp: , Rfl:     VITAMIN D PO, Take by mouth in the morning., Disp: , Rfl:

## 2025-06-02 NOTE — ASSESSMENT & PLAN NOTE
Blood pressure controlled with losartan.  Discussed diet and weight loss.  He has multiple risk factors for CAD including family history, hypertension and obesity.  Discussed risk factor reduction.  Will order coronary artery calcium score for further evaluation/restratification.

## 2025-06-02 NOTE — ASSESSMENT & PLAN NOTE
Discussed diet and weight loss.  He was on Mounjaro in the past but stopped due to severe side effects.

## 2025-06-03 DIAGNOSIS — I10 ESSENTIAL HYPERTENSION: ICD-10-CM

## 2025-06-04 RX ORDER — LOSARTAN POTASSIUM 100 MG/1
100 TABLET ORAL DAILY
Qty: 90 TABLET | Refills: 1 | Status: SHIPPED | OUTPATIENT
Start: 2025-06-04

## 2025-06-12 ENCOUNTER — HOSPITAL ENCOUNTER (OUTPATIENT)
Dept: CT IMAGING | Facility: HOSPITAL | Age: 45
Discharge: HOME | End: 2025-06-12
Attending: INTERNAL MEDICINE
Payer: COMMERCIAL

## 2025-06-12 DIAGNOSIS — I10 PRIMARY HYPERTENSION: ICD-10-CM

## 2025-06-12 PROCEDURE — 75571 CT HRT W/O DYE W/CA TEST: CPT

## 2025-06-23 ENCOUNTER — APPOINTMENT (OUTPATIENT)
Dept: LAB | Facility: HOSPITAL | Age: 45
End: 2025-06-23
Attending: FAMILY MEDICINE
Payer: COMMERCIAL

## 2025-06-23 ENCOUNTER — APPOINTMENT (OUTPATIENT)
Dept: LAB | Facility: HOSPITAL | Age: 45
End: 2025-06-23
Attending: PREVENTIVE MEDICINE
Payer: COMMERCIAL

## 2025-06-23 DIAGNOSIS — E78.5 HYPERLIPIDEMIA, UNSPECIFIED HYPERLIPIDEMIA TYPE: Primary | ICD-10-CM

## 2025-06-23 DIAGNOSIS — Z00.8 ENCOUNTER FOR OTHER GENERAL EXAMINATION: ICD-10-CM

## 2025-06-23 LAB
ALBUMIN SERPL BCG-MCNC: 4 G/DL (ref 3.5–5)
ALP SERPL-CCNC: 76 U/L (ref 34–104)
ALT SERPL W P-5'-P-CCNC: 41 U/L (ref 7–52)
ANION GAP SERPL CALCULATED.3IONS-SCNC: 9 MMOL/L (ref 4–13)
AST SERPL W P-5'-P-CCNC: 28 U/L (ref 13–39)
BILIRUB SERPL-MCNC: 0.33 MG/DL (ref 0.2–1)
BUN SERPL-MCNC: 13 MG/DL (ref 5–25)
CALCIUM SERPL-MCNC: 8.9 MG/DL (ref 8.4–10.2)
CHLORIDE SERPL-SCNC: 103 MMOL/L (ref 96–108)
CHOLEST SERPL-MCNC: 141 MG/DL (ref ?–200)
CO2 SERPL-SCNC: 26 MMOL/L (ref 21–32)
CREAT SERPL-MCNC: 0.76 MG/DL (ref 0.6–1.3)
EST. AVERAGE GLUCOSE BLD GHB EST-MCNC: 111 MG/DL
GFR SERPL CREATININE-BSD FRML MDRD: 112 ML/MIN/1.73SQ M
GLUCOSE P FAST SERPL-MCNC: 108 MG/DL (ref 65–99)
HBA1C MFR BLD: 5.5 %
HDLC SERPL-MCNC: 29 MG/DL
LDLC SERPL CALC-MCNC: 75 MG/DL (ref 0–100)
POTASSIUM SERPL-SCNC: 4.2 MMOL/L (ref 3.5–5.3)
PROT SERPL-MCNC: 6.8 G/DL (ref 6.4–8.4)
SODIUM SERPL-SCNC: 138 MMOL/L (ref 135–147)
TRIGL SERPL-MCNC: 183 MG/DL (ref ?–150)

## 2025-06-23 PROCEDURE — 80061 LIPID PANEL: CPT

## 2025-07-08 DIAGNOSIS — G47.10 HYPERSOMNOLENCE: ICD-10-CM

## 2025-07-08 RX ORDER — LISDEXAMFETAMINE DIMESYLATE 50 MG/1
50 CAPSULE ORAL EVERY MORNING
Qty: 90 CAPSULE | Refills: 0 | Status: SHIPPED | OUTPATIENT
Start: 2025-07-08

## 2025-07-14 DIAGNOSIS — F41.9 ANXIETY AND DEPRESSION: ICD-10-CM

## 2025-07-14 DIAGNOSIS — F32.A ANXIETY AND DEPRESSION: ICD-10-CM

## 2025-07-15 RX ORDER — ESCITALOPRAM OXALATE 10 MG/1
10 TABLET ORAL DAILY
Qty: 90 TABLET | Refills: 0 | Status: SHIPPED | OUTPATIENT
Start: 2025-07-15

## 2025-08-04 ENCOUNTER — OFFICE VISIT (OUTPATIENT)
Dept: PHYSICAL THERAPY | Facility: CLINIC | Age: 45
End: 2025-08-04
Payer: COMMERCIAL

## 2025-08-04 DIAGNOSIS — M48.061 SPINAL STENOSIS OF LUMBAR REGION, UNSPECIFIED WHETHER NEUROGENIC CLAUDICATION PRESENT: Primary | ICD-10-CM

## 2025-08-04 PROCEDURE — 97161 PT EVAL LOW COMPLEX 20 MIN: CPT

## 2025-08-04 PROCEDURE — 97530 THERAPEUTIC ACTIVITIES: CPT

## 2025-08-18 ENCOUNTER — OFFICE VISIT (OUTPATIENT)
Dept: PHYSICAL THERAPY | Facility: CLINIC | Age: 45
End: 2025-08-18
Payer: COMMERCIAL

## 2025-08-18 DIAGNOSIS — M48.061 SPINAL STENOSIS OF LUMBAR REGION, UNSPECIFIED WHETHER NEUROGENIC CLAUDICATION PRESENT: Primary | ICD-10-CM

## 2025-08-18 PROCEDURE — 97110 THERAPEUTIC EXERCISES: CPT

## 2025-08-20 ENCOUNTER — TELEPHONE (OUTPATIENT)
Dept: FAMILY MEDICINE CLINIC | Facility: CLINIC | Age: 45
End: 2025-08-20

## 2025-08-20 ENCOUNTER — TELEPHONE (OUTPATIENT)
Age: 45
End: 2025-08-20

## 2025-08-20 DIAGNOSIS — M54.16 LUMBAR BACK PAIN WITH RADICULOPATHY AFFECTING LEFT LOWER EXTREMITY: Primary | ICD-10-CM

## 2025-08-21 RX ORDER — CYCLOBENZAPRINE HCL 10 MG
10 TABLET ORAL
Qty: 30 TABLET | Refills: 0 | Status: SHIPPED | OUTPATIENT
Start: 2025-08-21

## (undated) DEVICE — GLOVE SRG BIOGEL 7.5

## (undated) DEVICE — IV SET EXT SM BORE CARESITE 8IN

## (undated) DEVICE — PLASTIC ADHESIVE BANDAGE: Brand: CURITY

## (undated) DEVICE — SYRINGE 5ML LL

## (undated) DEVICE — TRAY EPIDURAL PERIFIX 20GA X 3.5IN TUOHY 8ML

## (undated) DEVICE — SKIN MARKER DUAL TIP WITH RULER CAP, FLEXIBLE RULER AND LABELS: Brand: DEVON

## (undated) DEVICE — WIPES BABY PAMPERS SENSITIVE 36/PK

## (undated) DEVICE — RADIOLOGY STERILE LABELS: Brand: CENTURION

## (undated) DEVICE — TRAY PAIN SUPPORT

## (undated) DEVICE — FLEXIBLE ADHESIVE BANDAGE,X-LARGE: Brand: CURITY

## (undated) DEVICE — TOWEL SET X-RAY

## (undated) DEVICE — CHLORAPREP APPLICATOR TINTED 10.5ML ONE-STEP

## (undated) DEVICE — NEEDLE SPINAL 22G X 3.5 IN PLST HUB

## (undated) DEVICE — SYRINGE 10ML LL

## (undated) DEVICE — NEEDLE SPINAL SHORT BEVEL 22GX6 IN STERILE

## (undated) DEVICE — SYRINGE 3ML LL

## (undated) DEVICE — SYRINGE EPI 8ML LUER SLIP LOSS OF RESISTANCE PLASTIC PERFIX